# Patient Record
Sex: FEMALE | Race: WHITE | NOT HISPANIC OR LATINO | Employment: OTHER | URBAN - METROPOLITAN AREA
[De-identification: names, ages, dates, MRNs, and addresses within clinical notes are randomized per-mention and may not be internally consistent; named-entity substitution may affect disease eponyms.]

---

## 2018-11-13 ENCOUNTER — OFFICE VISIT (OUTPATIENT)
Dept: PODIATRY | Facility: CLINIC | Age: 83
End: 2018-11-13
Payer: MEDICARE

## 2018-11-13 VITALS
DIASTOLIC BLOOD PRESSURE: 79 MMHG | BODY MASS INDEX: 25.6 KG/M2 | HEIGHT: 59 IN | RESPIRATION RATE: 16 BRPM | WEIGHT: 127 LBS | HEART RATE: 69 BPM | SYSTOLIC BLOOD PRESSURE: 137 MMHG

## 2018-11-13 DIAGNOSIS — M20.11 VALGUS DEFORMITY OF BOTH GREAT TOES: ICD-10-CM

## 2018-11-13 DIAGNOSIS — M77.42 METATARSALGIA OF BOTH FEET: Primary | ICD-10-CM

## 2018-11-13 DIAGNOSIS — M77.41 METATARSALGIA OF BOTH FEET: Primary | ICD-10-CM

## 2018-11-13 DIAGNOSIS — L84 CORNS: ICD-10-CM

## 2018-11-13 DIAGNOSIS — M79.672 PAIN IN BOTH FEET: ICD-10-CM

## 2018-11-13 DIAGNOSIS — I70.209 PERIPHERAL ARTERIOSCLEROSIS (HCC): ICD-10-CM

## 2018-11-13 DIAGNOSIS — M21.969 ACQUIRED DEFORMITY OF FOOT, UNSPECIFIED LATERALITY: ICD-10-CM

## 2018-11-13 DIAGNOSIS — M79.671 PAIN IN BOTH FEET: ICD-10-CM

## 2018-11-13 DIAGNOSIS — M20.12 VALGUS DEFORMITY OF BOTH GREAT TOES: ICD-10-CM

## 2018-11-13 PROCEDURE — 99202 OFFICE O/P NEW SF 15 MIN: CPT | Performed by: PODIATRIST

## 2018-11-13 RX ORDER — METOPROLOL TARTRATE 50 MG/1
50 TABLET, FILM COATED ORAL 2 TIMES DAILY
COMMUNITY
Start: 2018-10-12 | End: 2019-06-06 | Stop reason: SDUPTHER

## 2018-11-13 RX ORDER — LOSARTAN POTASSIUM 50 MG/1
TABLET ORAL
COMMUNITY
Start: 2018-11-07 | End: 2019-06-06 | Stop reason: SDUPTHER

## 2018-11-13 RX ORDER — ASPIRIN 325 MG
325 TABLET ORAL DAILY
COMMUNITY
End: 2020-07-22

## 2018-11-13 RX ORDER — AMLODIPINE BESYLATE 5 MG/1
5 TABLET ORAL DAILY
COMMUNITY
End: 2019-06-06 | Stop reason: SDUPTHER

## 2018-11-13 RX ORDER — BIMATOPROST 0.01 %
1 DROPS OPHTHALMIC (EYE)
COMMUNITY
Start: 2018-08-20

## 2018-11-13 RX ORDER — ATORVASTATIN CALCIUM 10 MG/1
10 TABLET, FILM COATED ORAL DAILY
COMMUNITY
End: 2019-06-06 | Stop reason: SDUPTHER

## 2018-11-13 NOTE — PROGRESS NOTES
Assessment/Plan:  Pain  Metatarsalgia  Peripheral artery disease  Bunion formation  Callus formation  Mycotic toenail  Plan  Foot exam performed  Patient educated on conditions  All mycotic nails debrided  Plantar lesions debrided as well    No problem-specific Assessment & Plan notes found for this encounter  There are no diagnoses linked to this encounter  Subjective:  Patient complains of pain in her feet  Patient has pain with ambulation  No history of trauma  She has pain with shoe wear  No past medical history on file  No past surgical history on file  Allergies   Allergen Reactions    Cephalexin          Current Outpatient Prescriptions:     metoprolol tartrate (LOPRESSOR) 50 mg tablet, Take 50 mg by mouth 2 (two) times a day, Disp: , Rfl:     amLODIPine (NORVASC) 5 mg tablet, Take 5 mg by mouth daily, Disp: , Rfl:     aspirin 325 mg tablet, Take 325 mg by mouth daily, Disp: , Rfl:     atorvastatin (LIPITOR) 10 mg tablet, Take 10 mg by mouth daily, Disp: , Rfl:     losartan (COZAAR) 50 mg tablet, , Disp: , Rfl:     LUMIGAN 0 01 % ophthalmic drops, , Disp: , Rfl:     There is no problem list on file for this patient  Patient ID: Verona Goetz is a 80 y o  female  HPI    The following portions of the patient's history were reviewed and updated as appropriate: allergies, current medications, past family history, past medical history, past social history, past surgical history and problem list     Review of Systems      Objective:  Patient's shoes and socks removed     Foot Exam    General  General Appearance: appears stated age and healthy   Orientation: alert and oriented to person, place, and time   Affect: appropriate   Gait: antalgic       Right Foot/Ankle     Inspection and Palpation  Ecchymosis: none  Tenderness: metatarsals   Swelling: metatarsals   Arch: pes planus  Hammertoes: fifth toe  Hallux valgus: yes  Hallux limitus: yes  Skin Exam: callus; Neurovascular  Dorsalis pedis: 1+  Posterior tibial: 1+  Superficial peroneal nerve sensation: diminished  Deep peroneal nerve sensation: diminished  Sural nerve sensation: diminished  Achilles reflex: 2+    Muscle Strength  Ankle dorsiflexion: 4+      Left Foot/Ankle      Inspection and Palpation  Tenderness: metatarsals   Swelling: metatarsals   Arch: pes planus  Hammertoes: fifth toe  Hallux valgus: yes  Hallux limitus: yes    Neurovascular  Dorsalis pedis: 1+  Superficial peroneal nerve sensation: diminished  Deep peroneal nerve sensation: diminished  Sural nerve sensation: diminished  Achilles reflex: 2+    Muscle Strength  Ankle dorsiflexion: 4+        Physical Exam   Constitutional: She appears well-developed and well-nourished  Cardiovascular: Normal rate and regular rhythm  Pulses:       Dorsalis pedis pulses are 1+ on the right side, and 1+ on the left side  Posterior tibial pulses are 1+ on the right side  QA 9 findings noted bilateral   Negative digital hair noted  Positive abnormal cooling  Feet:   Right Foot:   Skin Integrity: Positive for callus  Neurological:   Reflex Scores:       Achilles reflexes are 2+ on the right side and 2+ on the left side  Skin:   Tylenol submetatarsal 2 noted  This is bilateral     Severe thickened mycotic toenail noted bilateral   Nails are yellow with debris  Positive malodor noted   Nursing note and vitals reviewed

## 2019-01-28 ENCOUNTER — OFFICE VISIT (OUTPATIENT)
Dept: PODIATRY | Facility: CLINIC | Age: 84
End: 2019-01-28
Payer: MEDICARE

## 2019-01-28 VITALS
SYSTOLIC BLOOD PRESSURE: 135 MMHG | HEART RATE: 65 BPM | HEIGHT: 59 IN | BODY MASS INDEX: 25.6 KG/M2 | RESPIRATION RATE: 16 BRPM | WEIGHT: 127 LBS | DIASTOLIC BLOOD PRESSURE: 86 MMHG

## 2019-01-28 DIAGNOSIS — I70.209 PERIPHERAL ARTERIOSCLEROSIS (HCC): ICD-10-CM

## 2019-01-28 DIAGNOSIS — L84 CORNS: ICD-10-CM

## 2019-01-28 DIAGNOSIS — M79.672 PAIN IN BOTH FEET: Primary | ICD-10-CM

## 2019-01-28 DIAGNOSIS — M79.671 PAIN IN BOTH FEET: Primary | ICD-10-CM

## 2019-01-28 PROCEDURE — 11056 PARNG/CUTG B9 HYPRKR LES 2-4: CPT | Performed by: PODIATRIST

## 2019-01-28 NOTE — PROGRESS NOTES
Procedures   Foot Exam     Assessment/Plan:  Pain  Metatarsalgia  Peripheral artery disease  Bunion formation  Callus formation  Mycotic toenail      Plan  Foot exam performed  Patient educated on conditions  All mycotic nails debrided  Plantar lesions debrided as well     No problem-specific Assessment & Plan notes found for this encounter          There are no diagnoses linked to this encounter        Subjective:  Patient complains of pain in her feet  Patient has pain with ambulation  No history of trauma  She has pain with shoe wear  No past medical history on file      No past surgical history on file           Allergies   Allergen Reactions    Cephalexin              Current Outpatient Prescriptions:     metoprolol tartrate (LOPRESSOR) 50 mg tablet, Take 50 mg by mouth 2 (two) times a day, Disp: , Rfl:     amLODIPine (NORVASC) 5 mg tablet, Take 5 mg by mouth daily, Disp: , Rfl:     aspirin 325 mg tablet, Take 325 mg by mouth daily, Disp: , Rfl:     atorvastatin (LIPITOR) 10 mg tablet, Take 10 mg by mouth daily, Disp: , Rfl:     losartan (COZAAR) 50 mg tablet, , Disp: , Rfl:     LUMIGAN 0 01 % ophthalmic drops, , Disp: , Rfl:      There is no problem list on file for this patient             Patient ID: Isidore Vic is a 80 y o  female      HPI     The following portions of the patient's history were reviewed and updated as appropriate: allergies, current medications, past family history, past medical history, past social history, past surgical history and problem list      Review of Systems       Objective:  Patient's shoes and socks removed     Foot Exam     General  General Appearance: appears stated age and healthy   Orientation: alert and oriented to person, place, and time   Affect: appropriate   Gait: antalgic         Right Foot/Ankle      Inspection and Palpation  Ecchymosis: none  Tenderness: metatarsals   Swelling: metatarsals   Arch: pes planus  Hammertoes: fifth toe  Hallux valgus: yes  Hallux limitus: yes  Skin Exam: callus;      Neurovascular  Dorsalis pedis: 1+  Posterior tibial: 1+  Superficial peroneal nerve sensation: diminished  Deep peroneal nerve sensation: diminished  Sural nerve sensation: diminished  Achilles reflex: 2+     Muscle Strength  Ankle dorsiflexion: 4+        Left Foot/Ankle       Inspection and Palpation  Tenderness: metatarsals   Swelling: metatarsals   Arch: pes planus  Hammertoes: fifth toe  Hallux valgus: yes  Hallux limitus: yes     Neurovascular  Dorsalis pedis: 1+  Superficial peroneal nerve sensation: diminished  Deep peroneal nerve sensation: diminished  Sural nerve sensation: diminished  Achilles reflex: 2+     Muscle Strength  Ankle dorsiflexion: 4+        Physical Exam   Constitutional: She appears well-developed and well-nourished  Cardiovascular: Normal rate and regular rhythm  Pulses:       Dorsalis pedis pulses are 1+ on the right side, and 1+ on the left side  Posterior tibial pulses are 1+ on the right side  Q 9 findings noted bilateral   Negative digital hair noted  Positive abnormal cooling  Feet:   Right Foot:   Skin Integrity: Positive for callus  Neurological:   Reflex Scores:       Achilles reflexes are 2+ on the right side and 2+ on the left side  Skin:   Tylenol submetatarsal 2 noted  This is bilateral     Severe thickened mycotic toenail noted bilateral   Nails are yellow with debris    Positive malodor noted   Nursing note and vitals reviewed

## 2019-04-01 ENCOUNTER — OFFICE VISIT (OUTPATIENT)
Dept: PODIATRY | Facility: CLINIC | Age: 84
End: 2019-04-01
Payer: MEDICARE

## 2019-04-01 VITALS
HEART RATE: 67 BPM | BODY MASS INDEX: 25.6 KG/M2 | WEIGHT: 127 LBS | DIASTOLIC BLOOD PRESSURE: 73 MMHG | RESPIRATION RATE: 16 BRPM | SYSTOLIC BLOOD PRESSURE: 143 MMHG | HEIGHT: 59 IN

## 2019-04-01 DIAGNOSIS — I70.209 PERIPHERAL ARTERIOSCLEROSIS (HCC): Primary | ICD-10-CM

## 2019-04-01 DIAGNOSIS — M79.672 PAIN IN BOTH FEET: ICD-10-CM

## 2019-04-01 DIAGNOSIS — M79.671 PAIN IN BOTH FEET: ICD-10-CM

## 2019-04-01 DIAGNOSIS — L84 CORNS: ICD-10-CM

## 2019-04-01 PROCEDURE — 11056 PARNG/CUTG B9 HYPRKR LES 2-4: CPT | Performed by: PODIATRIST

## 2019-06-03 ENCOUNTER — OFFICE VISIT (OUTPATIENT)
Dept: PODIATRY | Facility: CLINIC | Age: 84
End: 2019-06-03
Payer: MEDICARE

## 2019-06-03 VITALS
HEIGHT: 59 IN | SYSTOLIC BLOOD PRESSURE: 121 MMHG | WEIGHT: 127 LBS | BODY MASS INDEX: 25.6 KG/M2 | DIASTOLIC BLOOD PRESSURE: 71 MMHG | HEART RATE: 62 BPM

## 2019-06-03 DIAGNOSIS — M79.672 PAIN IN BOTH FEET: ICD-10-CM

## 2019-06-03 DIAGNOSIS — M79.671 PAIN IN BOTH FEET: ICD-10-CM

## 2019-06-03 DIAGNOSIS — I70.209 PERIPHERAL ARTERIOSCLEROSIS (HCC): Primary | ICD-10-CM

## 2019-06-03 DIAGNOSIS — L84 CORNS: ICD-10-CM

## 2019-06-03 PROCEDURE — 11056 PARNG/CUTG B9 HYPRKR LES 2-4: CPT | Performed by: PODIATRIST

## 2019-06-06 ENCOUNTER — OFFICE VISIT (OUTPATIENT)
Dept: FAMILY MEDICINE CLINIC | Facility: CLINIC | Age: 84
End: 2019-06-06
Payer: MEDICARE

## 2019-06-06 VITALS
BODY MASS INDEX: 25.8 KG/M2 | DIASTOLIC BLOOD PRESSURE: 60 MMHG | WEIGHT: 128 LBS | RESPIRATION RATE: 16 BRPM | TEMPERATURE: 97.9 F | HEIGHT: 59 IN | HEART RATE: 52 BPM | SYSTOLIC BLOOD PRESSURE: 128 MMHG

## 2019-06-06 DIAGNOSIS — Z95.2 S/P AORTIC VALVE REPLACEMENT: ICD-10-CM

## 2019-06-06 DIAGNOSIS — R60.0 BILATERAL LEG EDEMA: Primary | ICD-10-CM

## 2019-06-06 DIAGNOSIS — E66.3 OVERWEIGHT (BMI 25.0-29.9): ICD-10-CM

## 2019-06-06 DIAGNOSIS — H35.30 MACULAR DEGENERATION OF BOTH EYES, UNSPECIFIED TYPE: ICD-10-CM

## 2019-06-06 DIAGNOSIS — H40.89 OTHER GLAUCOMA OF BOTH EYES: ICD-10-CM

## 2019-06-06 DIAGNOSIS — I44.0 HEART BLOCK, FIRST DEGREE: ICD-10-CM

## 2019-06-06 DIAGNOSIS — E78.2 MIXED HYPERLIPIDEMIA: ICD-10-CM

## 2019-06-06 DIAGNOSIS — I10 ESSENTIAL HYPERTENSION: ICD-10-CM

## 2019-06-06 DIAGNOSIS — I49.5 SINUS BRADY-TACHY SYNDROME (HCC): ICD-10-CM

## 2019-06-06 PROBLEM — I35.9 AORTIC VALVE DISEASE: Status: ACTIVE | Noted: 2018-04-17

## 2019-06-06 PROBLEM — E78.5 HYPERLIPIDEMIA: Status: ACTIVE | Noted: 2019-06-06

## 2019-06-06 PROCEDURE — 99204 OFFICE O/P NEW MOD 45 MIN: CPT | Performed by: FAMILY MEDICINE

## 2019-06-06 RX ORDER — ATORVASTATIN CALCIUM 10 MG/1
10 TABLET, FILM COATED ORAL DAILY
Qty: 90 TABLET | Refills: 1 | Status: SHIPPED | OUTPATIENT
Start: 2019-06-06 | End: 2020-03-04

## 2019-06-06 RX ORDER — AMLODIPINE BESYLATE 5 MG/1
5 TABLET ORAL DAILY
Qty: 90 TABLET | Refills: 1 | Status: SHIPPED | OUTPATIENT
Start: 2019-06-06 | End: 2020-02-24

## 2019-06-06 RX ORDER — LOSARTAN POTASSIUM 100 MG/1
100 TABLET ORAL DAILY
Qty: 90 TABLET | Refills: 1 | Status: SHIPPED | OUTPATIENT
Start: 2019-06-06 | End: 2019-10-07 | Stop reason: SDUPTHER

## 2019-06-21 ENCOUNTER — HOSPITAL ENCOUNTER (OUTPATIENT)
Dept: RADIOLOGY | Facility: HOSPITAL | Age: 84
Discharge: HOME | End: 2019-06-21
Attending: FAMILY MEDICINE
Payer: MEDICARE

## 2019-06-21 DIAGNOSIS — R60.0 BILATERAL LEG EDEMA: ICD-10-CM

## 2019-06-21 PROCEDURE — 93970 EXTREMITY STUDY: CPT | Performed by: SURGERY

## 2019-06-21 PROCEDURE — 93970 EXTREMITY STUDY: CPT

## 2019-06-24 ENCOUNTER — TELEPHONE (OUTPATIENT)
Dept: FAMILY MEDICINE CLINIC | Facility: CLINIC | Age: 84
End: 2019-06-24

## 2019-08-05 ENCOUNTER — OFFICE VISIT (OUTPATIENT)
Dept: PODIATRY | Facility: CLINIC | Age: 84
End: 2019-08-05
Payer: MEDICARE

## 2019-08-05 VITALS
BODY MASS INDEX: 25.8 KG/M2 | SYSTOLIC BLOOD PRESSURE: 127 MMHG | HEIGHT: 59 IN | DIASTOLIC BLOOD PRESSURE: 78 MMHG | RESPIRATION RATE: 17 BRPM | WEIGHT: 128 LBS | HEART RATE: 68 BPM

## 2019-08-05 DIAGNOSIS — I70.209 PERIPHERAL ARTERIOSCLEROSIS (HCC): Primary | ICD-10-CM

## 2019-08-05 DIAGNOSIS — L84 CORNS: ICD-10-CM

## 2019-08-05 DIAGNOSIS — M79.672 PAIN IN BOTH FEET: ICD-10-CM

## 2019-08-05 DIAGNOSIS — M79.671 PAIN IN BOTH FEET: ICD-10-CM

## 2019-08-05 PROCEDURE — 11056 PARNG/CUTG B9 HYPRKR LES 2-4: CPT | Performed by: PODIATRIST

## 2019-08-05 NOTE — PROGRESS NOTES
Assessment/Plan:  Pain   Metatarsalgia   Peripheral artery disease   Bunion formation   Callus formation   Mycotic toenail      Plan   Foot exam performed   Patient educated on conditions   All mycotic nails debrided   Plantar lesions debrided as well     No problem-specific Assessment & Plan notes found for this encounter          There are no diagnoses linked to this encounter        Subjective:  Patient complains of pain in her feet   Patient has pain with ambulation   No history of trauma   She has pain with shoe wear  No past medical history on file      No past surgical history on file              Allergies   Allergen Reactions    Cephalexin              Current Outpatient Prescriptions:     metoprolol tartrate (LOPRESSOR) 50 mg tablet, Take 50 mg by mouth 2 (two) times a day, Disp: , Rfl:     amLODIPine (NORVASC) 5 mg tablet, Take 5 mg by mouth daily, Disp: , Rfl:     aspirin 325 mg tablet, Take 325 mg by mouth daily, Disp: , Rfl:     atorvastatin (LIPITOR) 10 mg tablet, Take 10 mg by mouth daily, Disp: , Rfl:     losartan (COZAAR) 50 mg tablet, , Disp: , Rfl:     LUMIGAN 0 01 % ophthalmic drops, , Disp: , Rfl:      There is no problem list on file for this patient             Patient ID: Rea Huizar is a 80 y  o  female      HPI     The following portions of the patient's history were reviewed and updated as appropriate: allergies, current medications, past family history, past medical history, past social history, past surgical history and problem list      Review of Systems       Objective:  Patient's shoes and socks removed    Foot Exam     General  General Appearance: appears stated age and healthy   Orientation: alert and oriented to person, place, and time   Affect: appropriate   Gait: antalgic         Right Foot/Ankle      Inspection and Palpation  Ecchymosis: none  Tenderness: metatarsals   Swelling: metatarsals   Arch: pes planus  Hammertoes: fifth toe  Hallux valgus: yes  Hallux limitus: yes  Skin Exam: callus;      Neurovascular  Dorsalis pedis: 1+  Posterior tibial: 1+  Superficial peroneal nerve sensation: diminished  Deep peroneal nerve sensation: diminished  Sural nerve sensation: diminished  Achilles reflex: 2+     Muscle Strength  Ankle dorsiflexion: 4+        Left Foot/Ankle       Inspection and Palpation  Tenderness: metatarsals   Swelling: metatarsals   Arch: pes planus  Hammertoes: fifth toe  Hallux valgus: yes  Hallux limitus: yes     Neurovascular  Dorsalis pedis: 1+  Superficial peroneal nerve sensation: diminished  Deep peroneal nerve sensation: diminished  Sural nerve sensation: diminished  Achilles reflex: 2+     Muscle Strength  Ankle dorsiflexion: 4+        Physical Exam   Constitutional: She appears well-developed and well-nourished  Cardiovascular: Normal rate and regular rhythm     Pulses:       Dorsalis pedis pulses are 1+ on the right side, and 1+ on the left side         Posterior tibial pulses are 1+ on the right side  Q 9 findings noted bilateral   Negative digital hair noted   Positive abnormal cooling    Feet:   Right Foot:   Skin Integrity: Positive for callus  Neurological:   Reflex Scores:       Achilles reflexes are 2+ on the right side and 2+ on the left side    Skin:   Tylenol submetatarsal 2 noted   This is bilateral     Severe thickened mycotic toenail noted bilateral   Nails are yellow with debris   Positive malodor noted   Nursing note and vitals reviewed

## 2019-10-07 ENCOUNTER — OFFICE VISIT (OUTPATIENT)
Dept: FAMILY MEDICINE CLINIC | Facility: CLINIC | Age: 84
End: 2019-10-07
Payer: MEDICARE

## 2019-10-07 VITALS
SYSTOLIC BLOOD PRESSURE: 130 MMHG | HEART RATE: 52 BPM | HEIGHT: 59 IN | RESPIRATION RATE: 16 BRPM | DIASTOLIC BLOOD PRESSURE: 58 MMHG | BODY MASS INDEX: 25.2 KG/M2 | TEMPERATURE: 97.3 F | WEIGHT: 125 LBS

## 2019-10-07 DIAGNOSIS — I49.5 SINUS BRADY-TACHY SYNDROME (HCC): ICD-10-CM

## 2019-10-07 DIAGNOSIS — Z95.2 S/P AORTIC VALVE REPLACEMENT: ICD-10-CM

## 2019-10-07 DIAGNOSIS — I10 ESSENTIAL HYPERTENSION: ICD-10-CM

## 2019-10-07 DIAGNOSIS — R60.0 BILATERAL LEG EDEMA: ICD-10-CM

## 2019-10-07 DIAGNOSIS — H40.9 GLAUCOMA OF BOTH EYES, UNSPECIFIED GLAUCOMA TYPE: ICD-10-CM

## 2019-10-07 DIAGNOSIS — E78.2 MIXED HYPERLIPIDEMIA: ICD-10-CM

## 2019-10-07 DIAGNOSIS — Z00.00 MEDICARE ANNUAL WELLNESS VISIT, INITIAL: Primary | ICD-10-CM

## 2019-10-07 DIAGNOSIS — Z23 NEED FOR VACCINATION: ICD-10-CM

## 2019-10-07 DIAGNOSIS — H91.93 DECREASED HEARING OF BOTH EARS: ICD-10-CM

## 2019-10-07 PROBLEM — H91.90 HEARING DECREASED: Status: ACTIVE | Noted: 2019-10-07

## 2019-10-07 PROCEDURE — G0008 ADMIN INFLUENZA VIRUS VAC: HCPCS

## 2019-10-07 PROCEDURE — G0438 PPPS, INITIAL VISIT: HCPCS | Performed by: FAMILY MEDICINE

## 2019-10-07 PROCEDURE — 90662 IIV NO PRSV INCREASED AG IM: CPT

## 2019-10-07 PROCEDURE — G0009 ADMIN PNEUMOCOCCAL VACCINE: HCPCS

## 2019-10-07 PROCEDURE — 90670 PCV13 VACCINE IM: CPT

## 2019-10-07 RX ORDER — TIMOLOL MALEATE 5 MG/ML
SOLUTION OPHTHALMIC EVERY MORNING
COMMUNITY
Start: 2019-09-05

## 2019-10-07 RX ORDER — LOSARTAN POTASSIUM 25 MG/1
25 TABLET ORAL DAILY
Qty: 90 TABLET | Refills: 1 | Status: SHIPPED | OUTPATIENT
Start: 2019-10-07 | End: 2020-03-27

## 2019-10-07 RX ORDER — FAMOTIDINE 20 MG/1
20 TABLET, FILM COATED ORAL DAILY
COMMUNITY
End: 2022-04-04

## 2019-10-07 NOTE — PATIENT INSTRUCTIONS
Medicare Preventive Visit Patient Instructions  Thank you for completing your Welcome to Medicare Visit or Medicare Annual Wellness Visit today  Your next wellness visit will be due in one year (10/7/2020)  The screening/preventive services that you may require over the next 5-10 years are detailed below  Some tests may not apply to you based off risk factors and/or age  Screening tests ordered at today's visit but not completed yet may show as past due  Also, please note that scanned in results may not display below  Preventive Screenings:  Service Recommendations Previous Testing/Comments   Colorectal Cancer Screening  * Colonoscopy    * Fecal Occult Blood Test (FOBT)/Fecal Immunochemical Test (FIT)  * Fecal DNA/Cologuard Test  * Flexible Sigmoidoscopy Age: 54-65 years old   Colonoscopy: every 10 years (may be performed more frequently if at higher risk)  OR  FOBT/FIT: every 1 year  OR  Cologuard: every 3 years  OR  Sigmoidoscopy: every 5 years  Screening may be recommended earlier than age 48 if at higher risk for colorectal cancer  Also, an individualized decision between you and your healthcare provider will decide whether screening between the ages of 74-80 would be appropriate  Colonoscopy: Not on file  FOBT/FIT: Not on file  Cologuard: Not on file  Sigmoidoscopy: Not on file         Breast Cancer Screening Age: 36 years old  Frequency: every 1-2 years  Not required if history of left and right mastectomy Mammogram: Not on file       Cervical Cancer Screening Between the ages of 21-29, pap smear recommended once every 3 years  Between the ages of 33-67, can perform pap smear with HPV co-testing every 5 years     Recommendations may differ for women with a history of total hysterectomy, cervical cancer, or abnormal pap smears in past  Pap Smear: Not on file    Screening Not Indicated   Hepatitis C Screening Once for adults born between [facility]  More frequently in patients at high risk for Hepatitis C Hep C Antibody: Not on file       Diabetes Screening 1-2 times per year if you're at risk for diabetes or have pre-diabetes Fasting glucose: No results in last 5 years   A1C: No results in last 5 years       Cholesterol Screening Once every 5 years if you don't have a lipid disorder  May order more often based on risk factors  Lipid panel: Not on file    Screening Not Indicated  History Lipid Disorder     Other Preventive Screenings Covered by Medicare:  1  Abdominal Aortic Aneurysm (AAA) Screening: covered once if your at risk  You're considered to be at risk if you have a family history of AAA  2  Lung Cancer Screening: covers low dose CT scan once per year if you meet all of the following conditions: (1) Age 50-69; (2) No signs or symptoms of lung cancer; (3) Current smoker or have quit smoking within the last 15 years; (4) You have a tobacco smoking history of at least 30 pack years (packs per day multiplied by number of years you smoked); (5) You get a written order from a healthcare provider  3  Glaucoma Screening: covered annually if you're considered high risk: (1) You have diabetes OR (2) Family history of glaucoma OR (3)  aged 48 and older OR (3)  American aged 72 and older  3  Osteoporosis Screening: covered every 2 years if you meet one of the following conditions: (1) You're estrogen deficient and at risk for osteoporosis based off medical history and other findings; (2) Have a vertebral abnormality; (3) On glucocorticoid therapy for more than 3 months; (4) Have primary hyperparathyroidism; (5) On osteoporosis medications and need to assess response to drug therapy  · Last bone density test (DXA Scan): Not on file  5  HIV Screening: covered annually if you're between the age of 12-76  Also covered annually if you are younger than 13 and older than 72 with risk factors for HIV infection   For pregnant patients, it is covered up to 3 times per pregnancy  Immunizations:  Immunization Recommendations   Influenza Vaccine Annual influenza vaccination during flu season is recommended for all persons aged >= 6 months who do not have contraindications   Pneumococcal Vaccine (Prevnar and Pneumovax)  * Prevnar = PCV13  * Pneumovax = PPSV23   Adults 25-60 years old: 1-3 doses may be recommended based on certain risk factors  Adults 72 years old: Prevnar (PCV13) vaccine recommended followed by Pneumovax (PPSV23) vaccine  If already received PPSV23 since turning 65, then PCV13 recommended at least one year after PPSV23 dose  Hepatitis B Vaccine 3 dose series if at intermediate or high risk (ex: diabetes, end stage renal disease, liver disease)   Tetanus (Td) Vaccine - COST NOT COVERED BY MEDICARE PART B Following completion of primary series, a booster dose should be given every 10 years to maintain immunity against tetanus  Td may also be given as tetanus wound prophylaxis  Tdap Vaccine - COST NOT COVERED BY MEDICARE PART B Recommended at least once for all adults  For pregnant patients, recommended with each pregnancy  Shingles Vaccine (Shingrix) - COST NOT COVERED BY MEDICARE PART B  2 shot series recommended in those aged 48 and above     Health Maintenance Due:  There are no preventive care reminders to display for this patient  Immunizations Due:      Topic Date Due    DTaP,Tdap,and Td Vaccines (1 - Tdap) 05/14/1956    Pneumococcal Vaccine: 65+ Years (2 of 2 - PCV13) 11/02/2014    INFLUENZA VACCINE  07/01/2019     Advance Directives   What are advance directives? Advance directives are legal documents that state your wishes and plans for medical care  These plans are made ahead of time in case you lose your ability to make decisions for yourself  Advance directives can apply to any medical decision, such as the treatments you want, and if you want to donate organs  What are the types of advance directives?   There are many types of advance directives, and each state has rules about how to use them  You may choose a combination of any of the following:  · Living will: This is a written record of the treatment you want  You can also choose which treatments you do not want, which to limit, and which to stop at a certain time  This includes surgery, medicine, IV fluid, and tube feedings  · Durable power of  for healthcare [facility]): This is a written record that states who you want to make healthcare choices for you when you are unable to make them for yourself  This person, called a proxy, is usually a family member or a friend  You may choose more than 1 proxy  · Do not resuscitate (DNR) order:  A DNR order is used in case your heart stops beating or you stop breathing  It is a request not to have certain forms of treatment, such as CPR  A DNR order may be included in other types of advance directives  · Medical directive: This covers the care that you want if you are in a coma, near death, or unable to make decisions for yourself  You can list the treatments you want for each condition  Treatment may include pain medicine, surgery, blood transfusions, dialysis, IV or tube feedings, and a ventilator (breathing machine)  · Values history: This document has questions about your views, beliefs, and how you feel and think about life  This information can help others choose the care that you would choose  Why are advance directives important? An advance directive helps you control your care  Although spoken wishes may be used, it is better to have your wishes written down  Spoken wishes can be misunderstood, or not followed  Treatments may be given even if you do not want them  An advance directive may make it easier for your family to make difficult choices about your care  Urinary Incontinence   Urinary incontinence (UI)  is when you lose control of your bladder  UI develops because your bladder cannot store or empty urine properly   The 3 most common types of UI are stress incontinence, urge incontinence, or both  Medicines:   · May be given to help strengthen your bladder control  Report any side effects of medication to your healthcare provider  Do pelvic muscle exercises often:  Your pelvic muscles help you stop urinating  Squeeze these muscles tight for 5 seconds, then relax for 5 seconds  Gradually work up to squeezing for 10 seconds  Do 3 sets of 15 repetitions a day, or as directed  This will help strengthen your pelvic muscles and improve bladder control  Train your bladder:  Go to the bathroom at set times, such as every 2 hours, even if you do not feel the urge to go  You can also try to hold your urine when you feel the urge to go  For example, hold your urine for 5 minutes when you feel the urge to go  As that becomes easier, hold your urine for 10 minutes  Self-care:   · Keep a UI record  Write down how often you leak urine and how much you leak  Make a note of what you were doing when you leaked urine  · Drink liquids as directed  You may need to limit the amount of liquid you drink to help control your urine leakage  Do not drink any liquid right before you go to bed  Limit or do not have drinks that contain caffeine or alcohol  · Prevent constipation  Eat a variety of high-fiber foods  Good examples are high-fiber cereals, beans, vegetables, and whole-grain breads  Walking is the best way to trigger your intestines to have a bowel movement  · Exercise regularly and maintain a healthy weight  Weight loss and exercise will decrease pressure on your bladder and help you control your leakage  · Use a catheter as directed  to help empty your bladder  A catheter is a tiny, plastic tube that is put into your bladder to drain your urine  · Go to behavior therapy as directed  Behavior therapy may be used to help you learn to control your urge to urinate      Weight Management   Why it is important to manage your weight: Being overweight increases your risk of health conditions such as heart disease, high blood pressure, type 2 diabetes, and certain types of cancer  It can also increase your risk for osteoarthritis, sleep apnea, and other respiratory problems  Aim for a slow, steady weight loss  Even a small amount of weight loss can lower your risk of health problems  How to lose weight safely:  A safe and healthy way to lose weight is to eat fewer calories and get regular exercise  You can lose up about 1 pound a week by decreasing the number of calories you eat by 500 calories each day  Healthy meal plan for weight management:  A healthy meal plan includes a variety of foods, contains fewer calories, and helps you stay healthy  A healthy meal plan includes the following:  · Eat whole-grain foods more often  A healthy meal plan should contain fiber  Fiber is the part of grains, fruits, and vegetables that is not broken down by your body  Whole-grain foods are healthy and provide extra fiber in your diet  Some examples of whole-grain foods are whole-wheat breads and pastas, oatmeal, brown rice, and bulgur  · Eat a variety of vegetables every day  Include dark, leafy greens such as spinach, kale, teo greens, and mustard greens  Eat yellow and orange vegetables such as carrots, sweet potatoes, and winter squash  · Eat a variety of fruits every day  Choose fresh or canned fruit (canned in its own juice or light syrup) instead of juice  Fruit juice has very little or no fiber  · Eat low-fat dairy foods  Drink fat-free (skim) milk or 1% milk  Eat fat-free yogurt and low-fat cottage cheese  Try low-fat cheeses such as mozzarella and other reduced-fat cheeses  · Choose meat and other protein foods that are low in fat  Choose beans or other legumes such as split peas or lentils  Choose fish, skinless poultry (chicken or turkey), or lean cuts of red meat (beef or pork)   Before you cook meat or poultry, cut off any visible fat    · Use less fat and oil  Try baking foods instead of frying them  Add less fat, such as margarine, sour cream, regular salad dressing and mayonnaise to foods  Eat fewer high-fat foods  Some examples of high-fat foods include french fries, doughnuts, ice cream, and cakes  · Eat fewer sweets  Limit foods and drinks that are high in sugar  This includes candy, cookies, regular soda, and sweetened drinks  Exercise:  Exercise at least 30 minutes per day on most days of the week  Some examples of exercise include walking, biking, dancing, and swimming  You can also fit in more physical activity by taking the stairs instead of the elevator or parking farther away from stores  Ask your healthcare provider about the best exercise plan for you  © Copyright [employer] 2018 Information is for End User's use only and may not be sold, redistributed or otherwise used for commercial purposes   All illustrations and images included in CareNotes® are the copyrighted property of [employer]  or [address] [place] [address]

## 2019-10-07 NOTE — PROGRESS NOTES
Assessment and Plan:     Problem List Items Addressed This Visit        Cardiovascular and Mediastinum    Sinus luis angel-tachy syndrome ([address] )    Essential hypertension    Relevant Medications    losartan (COZAAR) 25 mg tablet       Other    Mixed hyperlipidemia    Bilateral leg edema     Pt purchased stockings at [facility] and notices improvement in the legs         S/P aortic valve replacement    Glaucoma of both eyes    Relevant Medications    timolol (TIMOPTIC-XE) 0 5 % ophthalmic gel-forming    Hearing decreased    Relevant Orders    Ambulatory referral to Audiology      Other Visit Diagnoses     Medicare annual wellness visit, initial    -  Primary    Need for vaccination        Relevant Orders    PNEUMOCOCCAL CONJUGATE VACCINE 13-VALENT GREATER THAN 6 MONTHS    influenza vaccine, high-dose, PF 0 5 mL           Preventive health issues were discussed with patient, and age appropriate screening tests were ordered as noted in patient's After Visit Summary  Personalized health advice and appropriate referrals for health education or preventive services given if needed, as noted in patient's After Visit Summary       History of Present Illness:     Patient presents for Medicare Annual Wellness visit  Pt states this is her first  Last colon was 2 years ago - pt will get me results of colonoscopy    Daughter is asking about getting the pt's hearing tested    Patient Care Team:  Little Ramirez DO as PCP - General (Family Medicine)  Porter Sequeira MD as Consulting Physician (Ophthalmology)  Dutch Bello DPM as Consulting Physician (Podiatry)     Problem List:     Patient Active Problem List   Diagnosis    Metatarsalgia of both feet    Acquired deformity of foot    Valgus deformity of both great toes    Pain in both feet    Peripheral arteriosclerosis ([address] )    Corns    Sinus luis angel-tachy syndrome ([address] )    Essential hypertension    Mixed hyperlipidemia    Heart block, first degree    Aortic valve disease  Bilateral leg edema    S/P aortic valve replacement    Glaucoma of both eyes    Macular degeneration of both eyes    Hearing decreased      Past Medical and Surgical History:     Past Medical History:   Diagnosis Date    Aortic valve disease     Heart block, first degree     Hyperlipidemia     Hypertension     Sinus luis angel-tachy syndrome (HCC)     SSS (sick sinus syndrome) (HCC)      Past Surgical History:   Procedure Laterality Date    AORTIC VALVE REPLACEMENT  2015    Porcine valve      Family History:     Family History   Problem Relation Age of Onset    Heart disease Mother     Hypertension Mother     Diabetes Mother     Heart disease Father     Heart attack Father          at 54    Mental illness Neg Hx       Social History:     Social History     Socioeconomic History    Marital status: /Civil Union     Spouse name: None    Number of children: None    Years of education: None    Highest education level: None   Occupational History    None   Social Needs    Financial resource strain: None    Food insecurity:     Worry: None     Inability: None    Transportation needs:     Medical: None     Non-medical: None   Tobacco Use    Smoking status: Never Smoker    Smokeless tobacco: Never Used   Substance and Sexual Activity    Alcohol use: Not Currently    Drug use: None    Sexual activity: None   Lifestyle    Physical activity:     Days per week: None     Minutes per session: None    Stress: None   Relationships    Social connections:     Talks on phone: None     Gets together: None     Attends [employer] service: None     Active member of club or organization: None     Attends meetings of clubs or organizations: None     Relationship status: None    Intimate partner violence:     Fear of current or ex partner: None     Emotionally abused: None     Physically abused: None     Forced sexual activity: None   Other Topics Concern    None   Social History Narrative    None       Medications and Allergies:     Current Outpatient Medications   Medication Sig Dispense Refill    amLODIPine (NORVASC) 5 mg tablet Take 1 tablet (5 mg total) by mouth daily 90 tablet 1    aspirin 325 mg tablet Take 325 mg by mouth daily      atorvastatin (LIPITOR) 10 mg tablet Take 1 tablet (10 mg total) by mouth daily 90 tablet 1    famotidine (PEPCID) 20 mg tablet Take 20 mg by mouth 2 (two) times a day      losartan (COZAAR) 25 mg tablet Take 1 tablet (25 mg total) by mouth daily 90 tablet 1    LUMIGAN 0 01 % ophthalmic drops       metoprolol tartrate (LOPRESSOR) 25 mg tablet Take 0 5 tablets (12 5 mg total) by mouth 2 (two) times a day 90 tablet 1    timolol (TIMOPTIC-XE) 0 5 % ophthalmic gel-forming        No current facility-administered medications for this visit  Allergies   Allergen Reactions    Cephalexin       Immunizations:     Immunization History   Administered Date(s) Administered    INFLUENZA 09/27/2016, 09/13/2017, 09/17/2018    Pneumococcal Polysaccharide PPV23 11/02/2013      Health Maintenance: There are no preventive care reminders to display for this patient  Topic Date Due    DTaP,Tdap,and Td Vaccines (1 - Tdap) 05/14/1956    Pneumococcal Vaccine: 65+ Years (2 of 2 - PCV13) 11/02/2014    INFLUENZA VACCINE  07/01/2019      Medicare Health Risk Assessment:     /58   Pulse (!) 52   Temp (!) 97 3 °F (36 3 °C)   Resp 16   Ht 4' 11" (1 499 m)   Wt 56 7 kg (125 lb)   BMI 25 25 kg/m²      Lilia De Luna is here for her Initial Wellness visit  Health Risk Assessment:   Patient rates overall health as good  Patient feels that their physical health rating is same  Eyesight was rated as slightly worse  Hearing was rated as slightly worse  Patient feels that their emotional and mental health rating is same  Pain experienced in the last 7 days has been none  Patient states that she has experienced no weight loss or gain in last 6 months       Depression Screening:   PHQ-2 Score: 0      Fall Risk Screening: In the past year, patient has experienced: no history of falling in past year      Urinary Incontinence Screening:   Patient has leaked urine accidently in the last six months  Home Safety:  Patient has trouble with stairs inside or outside of their home  Patient has working smoke alarms and has no working carbon monoxide detector  Home safety hazards include: none  Nutrition:   Current diet is Regular and Limited junk food  Medications:   Patient is currently taking over-the-counter supplements  OTC medications include: see medication list  Patient is able to manage medications  Activities of Daily Living (ADLs)/Instrumental Activities of Daily Living (IADLs):   Walk and transfer into and out of bed and chair?: Yes  Dress and groom yourself?: Yes    Bathe or shower yourself?: Yes    Feed yourself?  Yes  Do your laundry/housekeeping?: Yes  Manage your money, pay your bills and track your expenses?: Yes  Make your own meals?: Yes    Do your own shopping?: No    ADL comments: Daughter helps with house keeping and shopping     Previous Hospitalizations:   Any hospitalizations or ED visits within the last 12 months?: No      Advance Care Planning:   Living will: Yes    Advanced directive: Yes      Cognitive Screening:   Provider or family/friend/caregiver concerned regarding cognition?: No    PREVENTIVE SCREENINGS      Cardiovascular Screening:    General: History Lipid Disorder and Risks and Benefits Discussed    Due for: Lipid Panel      Diabetes Screening:     General: Risks and Benefits Discussed    Due for: Blood Glucose      Colorectal Cancer Screening:     General: Screening Not Indicated      Breast Cancer Screening:     General: Patient Declines      Cervical Cancer Screening:    General: Screening Not Indicated      Osteoporosis Screening:    General: Screening Not Indicated      Abdominal Aortic Aneurysm (AAA) Screening:        General: Screening Not Indicated      Lung Cancer Screening:     General: Screening Not Indicated      Hepatitis C Screening:    General: Screening Not Indicated      Juan M Carlos DO

## 2019-10-14 ENCOUNTER — OFFICE VISIT (OUTPATIENT)
Dept: PODIATRY | Facility: CLINIC | Age: 84
End: 2019-10-14
Payer: MEDICARE

## 2019-10-14 VITALS
RESPIRATION RATE: 16 BRPM | HEIGHT: 59 IN | BODY MASS INDEX: 25.2 KG/M2 | SYSTOLIC BLOOD PRESSURE: 142 MMHG | DIASTOLIC BLOOD PRESSURE: 87 MMHG | WEIGHT: 125 LBS

## 2019-10-14 DIAGNOSIS — M79.672 PAIN IN BOTH FEET: ICD-10-CM

## 2019-10-14 DIAGNOSIS — L84 CORNS: ICD-10-CM

## 2019-10-14 DIAGNOSIS — I70.209 PERIPHERAL ARTERIOSCLEROSIS (HCC): Primary | ICD-10-CM

## 2019-10-14 DIAGNOSIS — M79.671 PAIN IN BOTH FEET: ICD-10-CM

## 2019-10-14 PROCEDURE — 99213 OFFICE O/P EST LOW 20 MIN: CPT | Performed by: PODIATRIST

## 2019-10-14 PROCEDURE — 11056 PARNG/CUTG B9 HYPRKR LES 2-4: CPT | Performed by: PODIATRIST

## 2019-10-14 NOTE — PROGRESS NOTES
Signed  Encounter Date: 8/5/2019   Assessment/Plan:  Pain   Metatarsalgia   Peripheral artery disease   Bunion formation   Callus formation   Mycotic toenail      Plan   Foot exam performed   Patient educated on conditions   All mycotic nails debrided   Plantar lesions debrided as well     No problem-specific Assessment & Plan notes found for this encounter          There are no diagnoses linked to this encounter        Subjective:  Patient complains of pain in her feet   Patient has pain with ambulation   No history of trauma   She has pain with shoe wear  No past medical history on file      No past surgical history on file              Allergies   Allergen Reactions    Cephalexin              Current Outpatient Prescriptions:     metoprolol tartrate (LOPRESSOR) 50 mg tablet, Take 50 mg by mouth 2 (two) times a day, Disp: , Rfl:     amLODIPine (NORVASC) 5 mg tablet, Take 5 mg by mouth daily, Disp: , Rfl:     aspirin 325 mg tablet, Take 325 mg by mouth daily, Disp: , Rfl:     atorvastatin (LIPITOR) 10 mg tablet, Take 10 mg by mouth daily, Disp: , Rfl:     losartan (COZAAR) 50 mg tablet, , Disp: , Rfl:     LUMIGAN 0 01 % ophthalmic drops, , Disp: , Rfl:      There is no problem list on file for this patient             Patient ID: Rea Huizar is a 80 y  o  female      HPI     The following portions of the patient's history were reviewed and updated as appropriate: allergies, current medications, past family history, past medical history, past social history, past surgical history and problem list      Review of Systems       Objective:  Patient's shoes and socks removed    Foot Exam     General  General Appearance: appears stated age and healthy   Orientation: alert and oriented to person, place, and time   Affect: appropriate   Gait: antalgic         Right Foot/Ankle      Inspection and Palpation  Ecchymosis: none  Tenderness: metatarsals   Swelling: metatarsals   Arch: pes planus  Hammertoes: fifth toe  Hallux valgus: yes  Hallux limitus: yes  Skin Exam: callus;      Neurovascular  Dorsalis pedis: 1+  Posterior tibial: 1+  Superficial peroneal nerve sensation: diminished  Deep peroneal nerve sensation: diminished  Sural nerve sensation: diminished  Achilles reflex: 2+     Muscle Strength  Ankle dorsiflexion: 4+        Left Foot/Ankle       Inspection and Palpation  Tenderness: metatarsals   Swelling: metatarsals   Arch: pes planus  Hammertoes: fifth toe  Hallux valgus: yes  Hallux limitus: yes     Neurovascular  Dorsalis pedis: 1+  Superficial peroneal nerve sensation: diminished  Deep peroneal nerve sensation: diminished  Sural nerve sensation: diminished  Achilles reflex: 2+     Muscle Strength  Ankle dorsiflexion: 4+        Physical Exam   Constitutional: She appears well-developed and well-nourished  Cardiovascular: Normal rate and regular rhythm     Pulses:       Dorsalis pedis pulses are 1+ on the right side, and 1+ on the left side         Posterior tibial pulses are 1+ on the right side  Q 9 findings noted bilateral   Negative digital hair noted   Positive abnormal cooling    Feet:   Right Foot:   Skin Integrity: Positive for callus  Neurological:   Reflex Scores:       Achilles reflexes are 2+ on the right side and 2+ on the left side    Skin:   Tylenol submetatarsal 2 noted   This is bilateral     Severe thickened mycotic toenail noted bilateral   Nails are yellow with debris   Positive malodor noted   Nursing note and vitals reviewed

## 2019-10-16 ENCOUNTER — OFFICE VISIT (OUTPATIENT)
Dept: AUDIOLOGY | Facility: CLINIC | Age: 84
End: 2019-10-16
Payer: MEDICARE

## 2019-10-16 DIAGNOSIS — H90.3 SENSORY HEARING LOSS, BILATERAL: Primary | ICD-10-CM

## 2019-10-16 PROCEDURE — 92557 COMPREHENSIVE HEARING TEST: CPT | Performed by: AUDIOLOGIST

## 2019-10-16 PROCEDURE — 92567 TYMPANOMETRY: CPT | Performed by: AUDIOLOGIST

## 2019-10-16 NOTE — PROGRESS NOTES
HEARING EVALUATION    Name:  Molly Gavin  :  1935  Age:  80 y o  Date of Evaluation: 10/16/19     History: Difficulty Understanding  Reason for visit: Molly Gavin is being seen today at the request of Dr Elysia Pettit for an evaluation of hearing and was in the accompaniment of her daughter  Parent reports subjective changes to her hearing sensitivities that have gradually declined over the past few years  Daughter voiced she will often find her mother will ask for things to be repeated or when in background noise/noisy environments she will appear lost or confused  Patient does report her brother also has hearing loss; unsure of degree and type  Today patient denies any otalgia, tinnitus, or dizziness  She denies any recent ear infections, ear surgeries, head injuries, or falls  EVALUATION:    Otoscopic Evaluation:   Right Ear: Clear and healthy ear canal and tympanic membrane   Left Ear: Clear and healthy ear canal and tympanic membrane    Tympanometry:   Right: Type A - normal middle ear pressure and compliance   Left: Type A - normal middle ear pressure and compliance    Audiogram Results:  TEST METHOD: Conventional Audiometry utilizing inserts in the sound pizarro setting  RELIABILITY: Good  SPEECH RESULTS: Speech Recognition Thresholds (SRT) were obtained at 30 dB HL in the right ear and at 20 dB HL in the left ear  Live Voice word recognition utilizing the W-22 word list revealed 76% when presented at 80 dB HL (MCL) in the right ear and 88% when presented at 75 dB HL (MCL) in the left ear  TONAL RESULTS: Air and bone conduction testing revealed normal sloping to severe sensorineural hearing loss  Asymmetry noted at 1500 and 2k Hz; worse in right       *see attached audiogram      RECOMMENDATIONS:  Annual hearing eval, Return to [facility]  for F/U, Hearing Aid Evaluation and Copy to Patient/Caregiver    PATIENT EDUCATION:   Discussed results and recommendations with the patient and her daughter at [facility]  Patient was made aware she is a hearing aid candidate for both ears  A brief introduction was completed today on the different types and styles of hearing aids  Patient was demonstrated a demo -in-the-ear (RITE) style hearing aid  At this time patient voiced interest in returning to further discuss purchasing hearing aids through our facility  Patient and her daugther were made aware they would be following up with my colleague, Dr Amanda Ronquillo for this; they voiced understanding and were provided with her card  Patient is scheduled to return in November  I did encourage she bring a family member along with her; she voiced understanding       Libra Duran , CCC-A  Clinical Audiologist

## 2019-11-01 ENCOUNTER — OFFICE VISIT (OUTPATIENT)
Dept: AUDIOLOGY | Facility: CLINIC | Age: 84
End: 2019-11-01

## 2019-11-01 DIAGNOSIS — H90.3 SENSORY HEARING LOSS, BILATERAL: Primary | ICD-10-CM

## 2019-11-15 ENCOUNTER — OFFICE VISIT (OUTPATIENT)
Dept: AUDIOLOGY | Facility: CLINIC | Age: 84
End: 2019-11-15

## 2019-11-15 ENCOUNTER — APPOINTMENT (OUTPATIENT)
Dept: LAB | Facility: CLINIC | Age: 84
End: 2019-11-15
Payer: MEDICARE

## 2019-11-15 DIAGNOSIS — I49.5 SINUS BRADY-TACHY SYNDROME (HCC): ICD-10-CM

## 2019-11-15 DIAGNOSIS — E78.2 MIXED HYPERLIPIDEMIA: ICD-10-CM

## 2019-11-15 DIAGNOSIS — H90.3 SENSORY HEARING LOSS, BILATERAL: Primary | ICD-10-CM

## 2019-11-15 LAB
ALBUMIN SERPL BCP-MCNC: 4 G/DL (ref 3.5–5)
ALP SERPL-CCNC: 104 U/L (ref 46–116)
ALT SERPL W P-5'-P-CCNC: 19 U/L (ref 12–78)
ANION GAP SERPL CALCULATED.3IONS-SCNC: 6 MMOL/L (ref 4–13)
AST SERPL W P-5'-P-CCNC: 22 U/L (ref 5–45)
BILIRUB SERPL-MCNC: 0.55 MG/DL (ref 0.2–1)
BUN SERPL-MCNC: 18 MG/DL (ref 5–25)
CALCIUM SERPL-MCNC: 9.3 MG/DL (ref 8.3–10.1)
CHLORIDE SERPL-SCNC: 100 MMOL/L (ref 100–108)
CHOLEST SERPL-MCNC: 130 MG/DL (ref 50–200)
CO2 SERPL-SCNC: 28 MMOL/L (ref 21–32)
CREAT SERPL-MCNC: 0.93 MG/DL (ref 0.6–1.3)
GFR SERPL CREATININE-BSD FRML MDRD: 57 ML/MIN/1.73SQ M
GLUCOSE P FAST SERPL-MCNC: 92 MG/DL (ref 65–99)
HDLC SERPL-MCNC: 75 MG/DL
LDLC SERPL CALC-MCNC: 42 MG/DL (ref 0–100)
POTASSIUM SERPL-SCNC: 4.3 MMOL/L (ref 3.5–5.3)
PROT SERPL-MCNC: 8.4 G/DL (ref 6.4–8.2)
SODIUM SERPL-SCNC: 134 MMOL/L (ref 136–145)
TRIGL SERPL-MCNC: 64 MG/DL
TSH SERPL DL<=0.05 MIU/L-ACNC: 1.42 UIU/ML (ref 0.36–3.74)

## 2019-11-15 PROCEDURE — 36415 COLL VENOUS BLD VENIPUNCTURE: CPT

## 2019-11-15 PROCEDURE — 80061 LIPID PANEL: CPT

## 2019-11-15 PROCEDURE — 80053 COMPREHEN METABOLIC PANEL: CPT

## 2019-11-15 PROCEDURE — 84443 ASSAY THYROID STIM HORMONE: CPT

## 2019-11-15 NOTE — PROGRESS NOTES
Hearing Aid Evaluation  Name:  Colleen Lanes  :  1935  Age:  80 y o  Date of Evaluation: 19    Audiologic Results: Audiologic testing was performed on 10/16/19, testing revealed a mild sloping to severe SNHL bilaterally, right greater than left 1000, 1500 and 2000 Hz  Amplification is recommended binaurally  Today, binaural discrimination was completed at 68 dBHL  Her score is 96%  She had b/d confusion only  Hearing Aid Evaluation:  Hearing aid styles, technology, and price were discussed with the patient and her daughter  Possible hearing aid options, programs, and accessories were discussed  Pricing options and technology levels were discussed to determine the appropriate device to recommend  Recommendation: The first hearing aid recommendation is  Oticon OPN S 2 R minirites  The second hearing aid recommendation is  Oticon OPN S 3 R minirites  Mrs Bela Maloney will be trialing a pair of OPN S 3 minirites fit to step 2, minus 1 click   All instructions were completed with her daughter present  Recommendations:  1  RTO in 2 weeks to assess outcomes   2  When evaluating her, a small, round growth was seen on top of the right ear pinna  They were advised to seek medical assessment        Libra Hawkins , CCC-A, [place]# [id number], Hearing Aid Dispenser, [place]# [id number]  Clinical Audiologist

## 2019-11-15 NOTE — PROGRESS NOTES
Progress Note    Name:  Michelle Jimenez  :  1935  Age:  80 y o  Date of Evaluation: 11/15/19     Patient has decided to order the OPN S 3 R minirite hearing aids  The order will be placed and the fitting appointment was scheduled for 19 at 2 PM       Her family reported that she communicated better while riding in the back of the car and while on a call to her daughter (while on speaker phone)  As they do not have locks, watch L  for movement  Additionally, they have an appointment with Dr Gladys Crook (dermatology) next week for the growth at the top of the pinna         Libra Qiu , CCC-A, [place]# [id number], Hearing Aid Dispenser, [place]# [id number]  Clinical Audiologist

## 2019-12-09 ENCOUNTER — OFFICE VISIT (OUTPATIENT)
Dept: AUDIOLOGY | Facility: CLINIC | Age: 84
End: 2019-12-09
Payer: COMMERCIAL

## 2019-12-09 DIAGNOSIS — H90.3 SENSORY HEARING LOSS, BILATERAL: Primary | ICD-10-CM

## 2019-12-09 PROCEDURE — V5261 HEARING AID, DIGIT, BIN, BTE: HCPCS | Performed by: AUDIOLOGIST

## 2019-12-09 NOTE — LETTER
December 10, 2019     Bebe Damon, DO  [address] [address]    Patient: Wade Alan   YOB: 1935   Date of Visit: 2019       Dear Dr Darron Plasencia:     Wade Alan was seen today for a hearing aid fitting  Below are my notes for this consultation  If you have questions, please do not hesitate to call me  I look forward to following your patient along with you  Sincerely,        Libra Sesay , CCC/A  Clinical Audiologist   [place]  [id number]        CC: No Recipients  Braulio Paulino  12/10/2019  3:21 PM  Sign at close encounter       Hearing Aid Fitting    Name:  Wade Alan  :  1935  Age:  80 y o  Date of Evaluation: 19    Wade Alan is being see today to be fit with new hearing aids  Patient is fit with Oticon OPN S 3 minirite R hearing aid(s)  Right serial number [id number]  Left serial number [id number]  Warranty date: 22 (Loss/Damage and repair)  :  [id number]      Ear mold Battery  Dome   Right N/A RC # 2 85 no lock 6mm DB   Left N/A RC # 2 85 no lock  6mm DB      The hearing aid(s) were adjusted based on the patient's most recent audiogram demo settings  Patient noted good sound quality, and was happy with the fitting  Care and cleaning of the hearing aids was reviewed  Domes, filters, RC battery charger and user manual were reviewed with the patient and her daughter  Insertion and removal of the hearing aids was done  The patient practiced insertion and removal of the devices in the office, they demonstrated good ability to manipulate the hearing aids  Telephone use was reviewed with the patient  The patient expressed satisfaction with the hearing aids  The growth on top of her right pinna has been removed by Dr Saad Sepulveda   It was reported to be a "clogged pore"  The incision has healed and it is difficult to see where the growth was initially  Recommendation:   1   As the patient has been utilizing the parmjit for a few weeks, she was scheduled for follow-up prior to the completion of the trial period for 1/14/20        Libra Carson , CCC-A, [place]# [id number], Hearing Aid Dispenser, [place]# [id number]  Clinical Audiologist

## 2019-12-10 NOTE — PROGRESS NOTES
Hearing Aid Fitting    Name:  Scout Landaverde  :  1935  Age:  80 y o  Date of Evaluation: 19    Scout Landaverde is being see today to be fit with new hearing aids  Patient is fit with Oticon OPN S 3 minirite R hearing aid(s)  Right serial number [id number]  Left serial number [id number]  Warranty date: 22 (Loss/Damage and repair)  :  0661057      Ear mold Battery  Dome   Right N/A RC # 2 85 no lock 6mm DB   Left N/A RC # 2 85 no lock  6mm DB      The hearing aid(s) were adjusted based on the patient's most recent audiogram demo settings  Patient noted good sound quality, and was happy with the fitting  Care and cleaning of the hearing aids was reviewed  Domes, filters, RC battery charger and user manual were reviewed with the patient and her daughter  Insertion and removal of the hearing aids was done  The patient practiced insertion and removal of the devices in the office, they demonstrated good ability to manipulate the hearing aids  Telephone use was reviewed with the patient  The patient expressed satisfaction with the hearing aids  The growth on top of her right pinna has been removed by Dr Stout Most   It was reported to be a "clogged pore"  The incision has healed and it is difficult to see where the growth was initially  Recommendation:   1  As the patient has been utilizing the demos for a few weeks, she was scheduled for follow-up prior to the completion of the trial period for 20        Libra Heredia , CCC-A, [place]# [id number], Hearing Aid Dispenser, [place]# [id number]  Clinical Audiologist

## 2019-12-16 ENCOUNTER — OFFICE VISIT (OUTPATIENT)
Dept: PODIATRY | Facility: CLINIC | Age: 84
End: 2019-12-16
Payer: MEDICARE

## 2019-12-16 VITALS
SYSTOLIC BLOOD PRESSURE: 132 MMHG | HEIGHT: 59 IN | BODY MASS INDEX: 25.2 KG/M2 | RESPIRATION RATE: 16 BRPM | DIASTOLIC BLOOD PRESSURE: 79 MMHG | WEIGHT: 125 LBS | HEART RATE: 74 BPM

## 2019-12-16 DIAGNOSIS — I70.209 PERIPHERAL ARTERIOSCLEROSIS (HCC): Primary | ICD-10-CM

## 2019-12-16 DIAGNOSIS — M79.671 PAIN IN BOTH FEET: ICD-10-CM

## 2019-12-16 DIAGNOSIS — M79.672 PAIN IN BOTH FEET: ICD-10-CM

## 2019-12-16 DIAGNOSIS — L84 CORNS: ICD-10-CM

## 2019-12-16 PROCEDURE — 11056 PARNG/CUTG B9 HYPRKR LES 2-4: CPT | Performed by: PODIATRIST

## 2020-01-07 ENCOUNTER — OFFICE VISIT (OUTPATIENT)
Dept: FAMILY MEDICINE CLINIC | Facility: CLINIC | Age: 85
End: 2020-01-07
Payer: MEDICARE

## 2020-01-07 VITALS
WEIGHT: 123 LBS | HEART RATE: 72 BPM | RESPIRATION RATE: 16 BRPM | SYSTOLIC BLOOD PRESSURE: 124 MMHG | BODY MASS INDEX: 24.8 KG/M2 | HEIGHT: 59 IN | DIASTOLIC BLOOD PRESSURE: 64 MMHG

## 2020-01-07 DIAGNOSIS — E78.2 MIXED HYPERLIPIDEMIA: ICD-10-CM

## 2020-01-07 DIAGNOSIS — J06.9 VIRAL URI WITH COUGH: ICD-10-CM

## 2020-01-07 DIAGNOSIS — I10 ESSENTIAL HYPERTENSION: Primary | ICD-10-CM

## 2020-01-07 DIAGNOSIS — I70.209 PERIPHERAL ARTERIOSCLEROSIS (HCC): ICD-10-CM

## 2020-01-07 DIAGNOSIS — M25.551 HIP PAIN, ACUTE, RIGHT: ICD-10-CM

## 2020-01-07 DIAGNOSIS — I49.5 SINUS BRADY-TACHY SYNDROME (HCC): ICD-10-CM

## 2020-01-07 PROCEDURE — 99214 OFFICE O/P EST MOD 30 MIN: CPT | Performed by: FAMILY MEDICINE

## 2020-01-07 RX ORDER — BENZONATATE 200 MG/1
200 CAPSULE ORAL 3 TIMES DAILY PRN
Qty: 30 CAPSULE | Refills: 0 | Status: SHIPPED | OUTPATIENT
Start: 2020-01-07 | End: 2020-03-11

## 2020-01-07 NOTE — PROGRESS NOTES
Assessment/Plan:    1  Essential hypertension  Assessment & Plan:  stable    Orders:  -     Comprehensive metabolic panel; Future; Expected date: 06/20/2020  -     CBC; Future; Expected date: 06/20/2020  -     Lipid Panel with Direct LDL reflex; Future; Expected date: 06/20/2020    2  Mixed hyperlipidemia  -     Comprehensive metabolic panel; Future; Expected date: 06/20/2020  -     CBC; Future; Expected date: 06/20/2020  -     Lipid Panel with Direct LDL reflex; Future; Expected date: 06/20/2020    3  Hip pain, acute, right  -     XR hip/pelv 2-3 vws right if performed; Future; Expected date: 01/07/2020  -     Comprehensive metabolic panel; Future; Expected date: 06/20/2020  -     CBC; Future; Expected date: 06/20/2020  -     Lipid Panel with Direct LDL reflex; Future; Expected date: 06/20/2020    4  Viral URI with cough  -     benzonatate (TESSALON) 200 MG capsule; Take 1 capsule (200 mg total) by mouth 3 (three) times a day as needed for cough  -     Comprehensive metabolic panel; Future; Expected date: 06/20/2020  -     CBC; Future; Expected date: 06/20/2020  -     Lipid Panel with Direct LDL reflex; Future; Expected date: 06/20/2020    5  Peripheral arteriosclerosis ([facility] [address] )  -     Comprehensive metabolic panel; Future; Expected date: 06/20/2020  -     CBC; Future; Expected date: 06/20/2020  -     Lipid Panel with Direct LDL reflex; Future; Expected date: 06/20/2020    6  Sinus luis angel-tachy syndrome (HCC)  -     Comprehensive metabolic panel; Future; Expected date: 06/20/2020  -     CBC; Future; Expected date: 06/20/2020  -     Lipid Panel with Direct LDL reflex; Future; Expected date: 06/20/2020          There are no Patient Instructions on file for this visit  Return in about 6 months (around 7/7/2020) for Recheck  Subjective:      Patient ID: Smith Fried is a 80 y o  female      Chief Complaint   Patient presents with    Follow-up     newest labs--adolph       Pt is here for a 3 month follow up  Pt denies CP, no SOB    Pt states she woke up yesterday with a pain in her rt hip - seems to be spreading some  Pain is 5/10, makes walking difficult  No trauma no fall    Never lightheaded never dizzy    Pt states she has phlem with come cough - pt has post nasal drip - the cold seemes to have strted yesterday    Pt denies palpitations        The following portions of the patient's history were reviewed and updated as appropriate: allergies, current medications, past family history, past medical history, past social history, past surgical history and problem list     Review of Systems   Constitutional: Negative  Negative for activity change, appetite change, chills, diaphoresis and fatigue  HENT: Positive for congestion and postnasal drip  Negative for dental problem, ear pain, sinus pressure and sore throat  Eyes: Negative  Negative for photophobia, pain, discharge, redness, itching and visual disturbance  Respiratory: Positive for cough  Negative for apnea and chest tightness  Cardiovascular: Negative  Negative for chest pain, palpitations and leg swelling  Gastrointestinal: Negative  Negative for abdominal distention, abdominal pain, constipation and diarrhea  Endocrine: Negative  Negative for cold intolerance and heat intolerance  Genitourinary: Negative  Negative for difficulty urinating and dyspareunia  Musculoskeletal: Positive for arthralgias and myalgias  Negative for back pain  Skin: Negative  Allergic/Immunologic: Negative for environmental allergies  Neurological: Negative  Negative for dizziness  Psychiatric/Behavioral: Negative  Negative for agitation           Current Outpatient Medications   Medication Sig Dispense Refill    amLODIPine (NORVASC) 5 mg tablet Take 1 tablet (5 mg total) by mouth daily 90 tablet 1    aspirin 325 mg tablet Take 325 mg by mouth daily      atorvastatin (LIPITOR) 10 mg tablet Take 1 tablet (10 mg total) by mouth daily 90 tablet 1    famotidine (PEPCID) 20 mg tablet Take 20 mg by mouth 2 (two) times a day      losartan (COZAAR) 25 mg tablet Take 1 tablet (25 mg total) by mouth daily 90 tablet 1    LUMIGAN 0 01 % ophthalmic drops       metoprolol tartrate (LOPRESSOR) 25 mg tablet Take 0 5 tablets (12 5 mg total) by mouth 2 (two) times a day 90 tablet 1    timolol (TIMOPTIC-XE) 0 5 % ophthalmic gel-forming       benzonatate (TESSALON) 200 MG capsule Take 1 capsule (200 mg total) by mouth 3 (three) times a day as needed for cough 30 capsule 0     No current facility-administered medications for this visit  Objective:    /64   Pulse 72   Resp 16   Ht 4' 11" (1 499 m)   Wt 55 8 kg (123 lb)   BMI 24 84 kg/m²        Physical Exam   Constitutional: She appears well-developed and well-nourished  No distress  HENT:   Head: Normocephalic and atraumatic  Right Ear: External ear normal    Left Ear: External ear normal    Nose: Mucosal edema present  Mouth/Throat: Oropharynx is clear and moist  No oropharyngeal exudate  Eyes: Pupils are equal, round, and reactive to light  EOM are normal  Right eye exhibits no discharge  Left eye exhibits no discharge  No scleral icterus  Neck: No thyromegaly present  Cardiovascular: Normal rate and normal heart sounds  No murmur heard  Pulmonary/Chest: Effort normal and breath sounds normal  No respiratory distress  She has no wheezes  Abdominal: Soft  Bowel sounds are normal  She exhibits no distension and no mass  There is no tenderness  There is no rebound and no guarding  Musculoskeletal: Normal range of motion  creps in rt hip   Neurological: She is alert  She displays normal reflexes  Coordination normal    Skin: Skin is warm and dry  No rash noted  She is not diaphoretic  No erythema  Psychiatric: She has a normal mood and affect  Her behavior is normal    Nursing note and vitals reviewed             Recent Results (from the past 2016 hour(s))   Comprehensive metabolic panel Collection Time: 11/15/19  7:48 AM   Result Value Ref Range    Sodium 134 (L) 136 - 145 mmol/L    Potassium 4 3 3 5 - 5 3 mmol/L    Chloride 100 100 - 108 mmol/L    CO2 28 21 - 32 mmol/L    ANION GAP 6 4 - 13 mmol/L    BUN 18 5 - 25 mg/dL    Creatinine 0 93 0 60 - 1 30 mg/dL    Glucose, Fasting 92 65 - 99 mg/dL    Calcium 9 3 8 3 - 10 1 mg/dL    AST 22 5 - 45 U/L    ALT 19 12 - 78 U/L    Alkaline Phosphatase 104 46 - 116 U/L    Total Protein 8 4 (H) 6 4 - 8 2 g/dL    Albumin 4 0 3 5 - 5 0 g/dL    Total Bilirubin 0 55 0 20 - 1 00 mg/dL    eGFR 57 ml/min/1 73sq m   Lipid Panel with Direct LDL reflex    Collection Time: 11/15/19  7:48 AM   Result Value Ref Range    Cholesterol 130 50 - 200 mg/dL    Triglycerides 64 <=150 mg/dL    HDL, Direct 75 >=40 mg/dL    LDL Calculated 42 0 - 100 mg/dL   TSH, 3rd generation    Collection Time: 11/15/19  7:48 AM   Result Value Ref Range    TSH 3RD GENERATON 1 420 0 358 - 3 740 uIU/mL         Samaria Ocampo DO

## 2020-01-14 ENCOUNTER — OFFICE VISIT (OUTPATIENT)
Dept: AUDIOLOGY | Facility: CLINIC | Age: 85
End: 2020-01-14

## 2020-01-14 DIAGNOSIS — H90.3 SENSORY HEARING LOSS, BILATERAL: Primary | ICD-10-CM

## 2020-01-14 NOTE — PROGRESS NOTES
Hearing Aid Visit:    Name:  Smith Fried  :  1935  Age:  80 y o  Date of Evaluation: 20     Smith Fried is being seen for a hearing aid visit one month post hearing aid fitting  Patient is fit with [employer] OPN S 3 minirite R hearing aid(s)  Right serial number [id number]  Left serial number [id number]  Warranty date: 22 (Loss/Damage and repair)  :  [id number]     Patient and daughter in law report that she is hearing well with the current settings  She is responding better and engaging better in conversations  There are no reported issues with fit  Action:  1  Went over wax guard and dome maintenance   2  Checked data:  Using an average of 4 hours / day; discussed  She will sometimes not wear when at home alone  Set to step 2-3 in 4 months  Recommendations:   1  RTO in  for follow-up/ family wants to wait until May to schedule; she will put it on her calendar to call      2  Patient paid balance of aids in office with Libra Bedolla , [facility], [place]# [id number], [facility], [place]# [id number]  Clinical Audiologist

## 2020-02-24 DIAGNOSIS — I10 ESSENTIAL HYPERTENSION: ICD-10-CM

## 2020-02-24 RX ORDER — AMLODIPINE BESYLATE 5 MG/1
TABLET ORAL
Qty: 90 TABLET | Refills: 1 | Status: SHIPPED | OUTPATIENT
Start: 2020-02-24 | End: 2020-07-22

## 2020-02-25 ENCOUNTER — OFFICE VISIT (OUTPATIENT)
Dept: PODIATRY | Facility: CLINIC | Age: 85
End: 2020-02-25
Payer: MEDICARE

## 2020-02-25 VITALS
WEIGHT: 123 LBS | SYSTOLIC BLOOD PRESSURE: 125 MMHG | BODY MASS INDEX: 24.8 KG/M2 | HEIGHT: 59 IN | DIASTOLIC BLOOD PRESSURE: 70 MMHG

## 2020-02-25 DIAGNOSIS — M21.962 ACQUIRED DEFORMITY OF LEFT FOOT: ICD-10-CM

## 2020-02-25 DIAGNOSIS — M79.672 PAIN IN BOTH FEET: ICD-10-CM

## 2020-02-25 DIAGNOSIS — M79.671 PAIN IN BOTH FEET: ICD-10-CM

## 2020-02-25 DIAGNOSIS — L84 CORNS: ICD-10-CM

## 2020-02-25 DIAGNOSIS — M20.42 HAMMER TOE OF LEFT FOOT: ICD-10-CM

## 2020-02-25 DIAGNOSIS — I70.209 PERIPHERAL ARTERIOSCLEROSIS (HCC): Primary | ICD-10-CM

## 2020-02-25 PROCEDURE — 11056 PARNG/CUTG B9 HYPRKR LES 2-4: CPT | Performed by: PODIATRIST

## 2020-02-25 PROCEDURE — 29550 STRAPPING OF TOES: CPT | Performed by: PODIATRIST

## 2020-02-25 NOTE — PROGRESS NOTES
Assessment/Plan:  Pain   Metatarsalgia   Peripheral artery disease   Bunion formation   Callus formation   Mycotic toenail  Clawtoe     Plan   Foot exam performed   Patient educated on conditions   All mycotic nails debrided   Plantar lesions debrided as well  Toes of left foot splinted in a rectus position      No problem-specific Assessment & Plan notes found for this encounter          There are no diagnoses linked to this encounter        Subjective:  Patient complains of pain in her feet   Patient has pain with ambulation   No history of trauma   She has pain with shoe wear  No past medical history on file      No past surgical history on file              Allergies   Allergen Reactions    Cephalexin              Current Outpatient Prescriptions:     metoprolol tartrate (LOPRESSOR) 50 mg tablet, Take 50 mg by mouth 2 (two) times a day, Disp: , Rfl:     amLODIPine (NORVASC) 5 mg tablet, Take 5 mg by mouth daily, Disp: , Rfl:     aspirin 325 mg tablet, Take 325 mg by mouth daily, Disp: , Rfl:     atorvastatin (LIPITOR) 10 mg tablet, Take 10 mg by mouth daily, Disp: , Rfl:     losartan (COZAAR) 50 mg tablet, , Disp: , Rfl:     LUMIGAN 0 01 % ophthalmic drops, , Disp: , Rfl:      There is no problem list on file for this patient             Patient ID: Rea Huizar is a 80 y  o  female      HPI     The following portions of the patient's history were reviewed and updated as appropriate: allergies, current medications, past family history, past medical history, past social history, past surgical history and problem list      Review of Systems       Objective:  Patient's shoes and socks removed    Foot Exam     General  General Appearance: appears stated age and healthy   Orientation: alert and oriented to person, place, and time   Affect: appropriate   Gait: antalgic         Right Foot/Ankle      Inspection and Palpation  Ecchymosis: none  Tenderness: metatarsals   Swelling: metatarsals   Arch: pes planus  Hammertoes: fifth toe   234 toes of the right foot are clawed  Hallux valgus: yes  Hallux limitus: yes  Skin Exam: callus;      Neurovascular  Dorsalis pedis: 1+  Posterior tibial: 1+  Superficial peroneal nerve sensation: diminished  Deep peroneal nerve sensation: diminished  Sural nerve sensation: diminished  Achilles reflex: 2+     Muscle Strength  Ankle dorsiflexion: 4+        Left Foot/Ankle       Inspection and Palpation  Tenderness: metatarsals   Swelling: metatarsals   Arch: pes planus  Hammertoes: fifth toe  Toes 234 are clawed a period  Hallux valgus: yes  Hallux limitus: yes     Neurovascular  Dorsalis pedis: 1+  Superficial peroneal nerve sensation: diminished  Deep peroneal nerve sensation: diminished  Sural nerve sensation: diminished  Achilles reflex: 2+     Muscle Strength  Ankle dorsiflexion: 4+        Physical Exam   Constitutional: She appears well-developed and well-nourished  Cardiovascular: Normal rate and regular rhythm     Pulses:       Dorsalis pedis pulses are 1+ on the right side, and 1+ on the left side         Posterior tibial pulses are 1+ on the right side  Q 9 findings noted bilateral   Negative digital hair noted   Positive abnormal cooling    Feet:   Right Foot:   Skin Integrity: Positive for callus  Neurological:   Reflex Scores:       Achilles reflexes are 2+ on the right side and 2+ on the left side  Skin:   Tylenol submetatarsal 2 noted   This is bilateral     Severe thickened mycotic toenail noted bilateral   Nails are yellow with debris   Positive malodor noted     Distal clavi by 2nd 3rd and 4th toes of the left foot    Nursing note and vitals reviewed

## 2020-03-04 DIAGNOSIS — E78.2 MIXED HYPERLIPIDEMIA: ICD-10-CM

## 2020-03-04 RX ORDER — ATORVASTATIN CALCIUM 10 MG/1
TABLET, FILM COATED ORAL
Qty: 90 TABLET | Refills: 1 | Status: SHIPPED | OUTPATIENT
Start: 2020-03-04 | End: 2020-07-22

## 2020-03-11 ENCOUNTER — OFFICE VISIT (OUTPATIENT)
Dept: FAMILY MEDICINE CLINIC | Facility: CLINIC | Age: 85
End: 2020-03-11
Payer: MEDICARE

## 2020-03-11 VITALS
OXYGEN SATURATION: 97 % | DIASTOLIC BLOOD PRESSURE: 70 MMHG | TEMPERATURE: 97.6 F | SYSTOLIC BLOOD PRESSURE: 130 MMHG | WEIGHT: 126 LBS | HEART RATE: 58 BPM | HEIGHT: 59 IN | BODY MASS INDEX: 25.4 KG/M2 | RESPIRATION RATE: 16 BRPM

## 2020-03-11 DIAGNOSIS — R53.1 WEAKNESS: ICD-10-CM

## 2020-03-11 DIAGNOSIS — I10 ESSENTIAL HYPERTENSION: Primary | ICD-10-CM

## 2020-03-11 PROCEDURE — 3075F SYST BP GE 130 - 139MM HG: CPT | Performed by: FAMILY MEDICINE

## 2020-03-11 PROCEDURE — 4040F PNEUMOC VAC/ADMIN/RCVD: CPT | Performed by: FAMILY MEDICINE

## 2020-03-11 PROCEDURE — 1160F RVW MEDS BY RX/DR IN RCRD: CPT | Performed by: FAMILY MEDICINE

## 2020-03-11 PROCEDURE — 99213 OFFICE O/P EST LOW 20 MIN: CPT | Performed by: FAMILY MEDICINE

## 2020-03-11 PROCEDURE — 3008F BODY MASS INDEX DOCD: CPT | Performed by: FAMILY MEDICINE

## 2020-03-11 PROCEDURE — 1036F TOBACCO NON-USER: CPT | Performed by: FAMILY MEDICINE

## 2020-03-11 PROCEDURE — 3078F DIAST BP <80 MM HG: CPT | Performed by: FAMILY MEDICINE

## 2020-03-11 NOTE — PROGRESS NOTES
Assessment/Plan:    1  Essential hypertension  Assessment & Plan:  stable    Orders:  -     CBC; Future  -     Comprehensive metabolic panel; Future  -     UA (URINE) with reflex to Scope  -     TSH, 3rd generation; Future    2  Weakness  -     CBC; Future  -     Comprehensive metabolic panel; Future  -     UA (URINE) with reflex to Scope  -     TSH, 3rd generation; Future          There are no Patient Instructions on file for this visit  No follow-ups on file  Subjective:      Patient ID: Scout Landaverde is a 80 y o  female  Chief Complaint   Patient presents with    Cold Like Symptoms     wmcma    Cough       I shere for a same day appt for a cold    States her legs are whoosy  Feels groogy  Pt does not have fever  Her huisband is sick in a facility   Daughter stated they wanted to come in to make sure she was alright  Pt reports minor cough  Throat is a little sore  Some post nasal drip  Took some cough drops      The following portions of the patient's history were reviewed and updated as appropriate: allergies, current medications, past family history, past medical history, past social history, past surgical history and problem list     Review of Systems   Constitutional: Negative  Negative for activity change, appetite change, chills, diaphoresis and fatigue  HENT: Positive for congestion  Negative for dental problem, ear pain, sinus pressure and sore throat  Eyes: Negative  Negative for photophobia, pain, discharge, redness, itching and visual disturbance  Respiratory: Positive for cough  Negative for apnea and chest tightness  Cardiovascular: Negative  Negative for chest pain, palpitations and leg swelling  Gastrointestinal: Negative  Negative for abdominal distention, abdominal pain, constipation and diarrhea  Endocrine: Negative  Negative for cold intolerance and heat intolerance  Genitourinary: Negative  Negative for difficulty urinating and dyspareunia     Musculoskeletal: Negative  Negative for arthralgias and back pain  Skin: Negative  Allergic/Immunologic: Negative for environmental allergies  Neurological: Negative  Negative for dizziness  Psychiatric/Behavioral: Negative  Negative for agitation  Current Outpatient Medications   Medication Sig Dispense Refill    amLODIPine (NORVASC) 5 mg tablet TAKE ONE TABLET BY MOUTH EVERY DAY 90 tablet 1    aspirin 325 mg tablet Take 325 mg by mouth daily      atorvastatin (LIPITOR) 10 mg tablet TAKE ONE TABLET BY MOUTH EVERY DAY 90 tablet 1    famotidine (PEPCID) 20 mg tablet Take 20 mg by mouth 2 (two) times a day      losartan (COZAAR) 25 mg tablet Take 1 tablet (25 mg total) by mouth daily 90 tablet 1    LUMIGAN 0 01 % ophthalmic drops       metoprolol tartrate (LOPRESSOR) 25 mg tablet Take 0 5 tablets (12 5 mg total) by mouth 2 (two) times a day 90 tablet 1    timolol (TIMOPTIC-XE) 0 5 % ophthalmic gel-forming        No current facility-administered medications for this visit  Objective:    /70   Pulse 58   Temp 97 6 °F (36 4 °C)   Resp 16   Ht 4' 11" (1 499 m)   Wt 57 2 kg (126 lb)   SpO2 97%   BMI 25 45 kg/m²        Physical Exam   Constitutional: She appears well-developed and well-nourished  No distress  HENT:   Head: Normocephalic and atraumatic  Right Ear: External ear normal    Left Ear: External ear normal    Nose: Mucosal edema present  Mouth/Throat: Oropharynx is clear and moist  No oropharyngeal exudate  Eyes: Pupils are equal, round, and reactive to light  EOM are normal  Right eye exhibits no discharge  Left eye exhibits no discharge  No scleral icterus  Neck: No thyromegaly present  Cardiovascular: Normal rate and normal heart sounds  No murmur heard  Pulmonary/Chest: Effort normal and breath sounds normal  No respiratory distress  She has no wheezes  Abdominal: Soft  Bowel sounds are normal  She exhibits no distension and no mass  There is no tenderness  There is no rebound and no guarding  Musculoskeletal: Normal range of motion  Neurological: She is alert  She displays normal reflexes  Coordination normal    Skin: Skin is warm and dry  No rash noted  She is not diaphoretic  No erythema  Psychiatric: She has a normal mood and affect  Her behavior is normal    Nursing note and vitals reviewed               Juan M Carlos DO

## 2020-03-27 DIAGNOSIS — I10 ESSENTIAL HYPERTENSION: ICD-10-CM

## 2020-03-27 RX ORDER — LOSARTAN POTASSIUM 25 MG/1
TABLET ORAL
Qty: 90 TABLET | Refills: 1 | Status: SHIPPED | OUTPATIENT
Start: 2020-03-27 | End: 2020-08-06 | Stop reason: SDUPTHER

## 2020-06-30 ENCOUNTER — OFFICE VISIT (OUTPATIENT)
Dept: PODIATRY | Facility: CLINIC | Age: 85
End: 2020-06-30
Payer: MEDICARE

## 2020-06-30 VITALS
HEIGHT: 59 IN | DIASTOLIC BLOOD PRESSURE: 70 MMHG | WEIGHT: 126 LBS | SYSTOLIC BLOOD PRESSURE: 130 MMHG | HEART RATE: 58 BPM | RESPIRATION RATE: 16 BRPM | BODY MASS INDEX: 25.4 KG/M2

## 2020-06-30 DIAGNOSIS — M79.672 PAIN IN BOTH FEET: ICD-10-CM

## 2020-06-30 DIAGNOSIS — M79.671 PAIN IN BOTH FEET: ICD-10-CM

## 2020-06-30 DIAGNOSIS — L84 CORNS: ICD-10-CM

## 2020-06-30 DIAGNOSIS — I70.209 PERIPHERAL ARTERIOSCLEROSIS (HCC): Primary | ICD-10-CM

## 2020-06-30 PROCEDURE — 11057 PARNG/CUTG B9 HYPRKR LES >4: CPT | Performed by: PODIATRIST

## 2020-07-07 ENCOUNTER — APPOINTMENT (OUTPATIENT)
Dept: LAB | Facility: CLINIC | Age: 85
End: 2020-07-07
Payer: MEDICARE

## 2020-07-07 DIAGNOSIS — R53.1 WEAKNESS: ICD-10-CM

## 2020-07-07 DIAGNOSIS — I10 ESSENTIAL HYPERTENSION: ICD-10-CM

## 2020-07-07 DIAGNOSIS — M25.551 HIP PAIN, ACUTE, RIGHT: ICD-10-CM

## 2020-07-07 DIAGNOSIS — I49.5 SINUS BRADY-TACHY SYNDROME (HCC): ICD-10-CM

## 2020-07-07 DIAGNOSIS — E78.2 MIXED HYPERLIPIDEMIA: ICD-10-CM

## 2020-07-07 DIAGNOSIS — I70.209 PERIPHERAL ARTERIOSCLEROSIS (HCC): ICD-10-CM

## 2020-07-07 DIAGNOSIS — J06.9 VIRAL URI WITH COUGH: ICD-10-CM

## 2020-07-07 LAB
ALBUMIN SERPL BCP-MCNC: 3.5 G/DL (ref 3.5–5)
ALP SERPL-CCNC: 113 U/L (ref 46–116)
ALT SERPL W P-5'-P-CCNC: 17 U/L (ref 12–78)
ANION GAP SERPL CALCULATED.3IONS-SCNC: 5 MMOL/L (ref 4–13)
AST SERPL W P-5'-P-CCNC: 20 U/L (ref 5–45)
BILIRUB SERPL-MCNC: 0.4 MG/DL (ref 0.2–1)
BILIRUB UR QL STRIP: NEGATIVE
BUN SERPL-MCNC: 17 MG/DL (ref 5–25)
CALCIUM SERPL-MCNC: 8.6 MG/DL (ref 8.3–10.1)
CHLORIDE SERPL-SCNC: 99 MMOL/L (ref 100–108)
CHOLEST SERPL-MCNC: 134 MG/DL (ref 50–200)
CLARITY UR: NORMAL
CO2 SERPL-SCNC: 27 MMOL/L (ref 21–32)
COLOR UR: YELLOW
CREAT SERPL-MCNC: 0.91 MG/DL (ref 0.6–1.3)
ERYTHROCYTE [DISTWIDTH] IN BLOOD BY AUTOMATED COUNT: 14.5 % (ref 11.6–15.1)
GFR SERPL CREATININE-BSD FRML MDRD: 58 ML/MIN/1.73SQ M
GLUCOSE P FAST SERPL-MCNC: 87 MG/DL (ref 65–99)
GLUCOSE UR STRIP-MCNC: NEGATIVE MG/DL
HCT VFR BLD AUTO: 39.4 % (ref 34.8–46.1)
HDLC SERPL-MCNC: 70 MG/DL
HGB BLD-MCNC: 12.5 G/DL (ref 11.5–15.4)
HGB UR QL STRIP.AUTO: NEGATIVE
KETONES UR STRIP-MCNC: NEGATIVE MG/DL
LDLC SERPL CALC-MCNC: 48 MG/DL (ref 0–100)
LEUKOCYTE ESTERASE UR QL STRIP: NEGATIVE
MCH RBC QN AUTO: 29.8 PG (ref 26.8–34.3)
MCHC RBC AUTO-ENTMCNC: 31.7 G/DL (ref 31.4–37.4)
MCV RBC AUTO: 94 FL (ref 82–98)
NITRITE UR QL STRIP: NEGATIVE
PH UR STRIP.AUTO: 7 [PH]
PLATELET # BLD AUTO: 165 THOUSANDS/UL (ref 149–390)
PMV BLD AUTO: 10.4 FL (ref 8.9–12.7)
POTASSIUM SERPL-SCNC: 4.4 MMOL/L (ref 3.5–5.3)
PROT SERPL-MCNC: 8 G/DL (ref 6.4–8.2)
PROT UR STRIP-MCNC: NEGATIVE MG/DL
RBC # BLD AUTO: 4.19 MILLION/UL (ref 3.81–5.12)
SODIUM SERPL-SCNC: 131 MMOL/L (ref 136–145)
SP GR UR STRIP.AUTO: 1.01 (ref 1–1.03)
TRIGL SERPL-MCNC: 78 MG/DL
TSH SERPL DL<=0.05 MIU/L-ACNC: 1.65 UIU/ML (ref 0.36–3.74)
UROBILINOGEN UR QL STRIP.AUTO: 0.2 E.U./DL
WBC # BLD AUTO: 4.08 THOUSAND/UL (ref 4.31–10.16)

## 2020-07-07 PROCEDURE — 80053 COMPREHEN METABOLIC PANEL: CPT

## 2020-07-07 PROCEDURE — 85027 COMPLETE CBC AUTOMATED: CPT

## 2020-07-07 PROCEDURE — 36415 COLL VENOUS BLD VENIPUNCTURE: CPT

## 2020-07-07 PROCEDURE — 80061 LIPID PANEL: CPT

## 2020-07-07 PROCEDURE — 81003 URINALYSIS AUTO W/O SCOPE: CPT | Performed by: FAMILY MEDICINE

## 2020-07-07 PROCEDURE — 84443 ASSAY THYROID STIM HORMONE: CPT

## 2020-07-22 ENCOUNTER — CONSULT (OUTPATIENT)
Dept: CARDIOLOGY CLINIC | Facility: CLINIC | Age: 85
End: 2020-07-22
Payer: MEDICARE

## 2020-07-22 VITALS
OXYGEN SATURATION: 98 % | TEMPERATURE: 98.8 F | SYSTOLIC BLOOD PRESSURE: 126 MMHG | WEIGHT: 125 LBS | HEART RATE: 58 BPM | DIASTOLIC BLOOD PRESSURE: 70 MMHG | BODY MASS INDEX: 25.2 KG/M2 | HEIGHT: 59 IN

## 2020-07-22 DIAGNOSIS — I35.9 AORTIC VALVE DISEASE: ICD-10-CM

## 2020-07-22 DIAGNOSIS — E78.2 MIXED HYPERLIPIDEMIA: ICD-10-CM

## 2020-07-22 DIAGNOSIS — I44.0 HEART BLOCK, FIRST DEGREE: Primary | ICD-10-CM

## 2020-07-22 PROCEDURE — 99204 OFFICE O/P NEW MOD 45 MIN: CPT | Performed by: INTERNAL MEDICINE

## 2020-07-22 PROCEDURE — 93000 ELECTROCARDIOGRAM COMPLETE: CPT | Performed by: INTERNAL MEDICINE

## 2020-07-22 RX ORDER — ASPIRIN 81 MG/1
81 TABLET ORAL DAILY
Status: ON HOLD
Start: 2020-07-22 | End: 2020-12-09 | Stop reason: SDUPTHER

## 2020-07-22 NOTE — PROGRESS NOTES
[facility]  [address]  [address]   [place], [address] [place]    Cardiology Consultation    Adrianna Sage  [id number]  1935      Consult for: AVR  Appreciate consult by: Tony Rob DO      Discussion/Summary:     1  Heart block, first degree  POCT ECG   2  Aortic valve disease  POCT ECG    Echo complete with contrast if indicated    aspirin (ECOTRIN LOW STRENGTH) 81 mg EC tablet   3  Mixed hyperlipidemia  POCT ECG      - Ms  Gaye has bilateral lower extremity edema present on exam today  This may be secondary to amlodipine or from congestion  Will discontinue amlodipine   - 2D echocardiogram will be obtained to evaluate LV function and filling pressures  - reduce aspirin to 81 mg daily  - may discontinue atorvastatin  Her last LDL cholesterol was 48  The benefits of statins in octogenarians for primary prevention is limited  May repeat lipid panel in 6 months to reassess lipid levels  May restart statin if LDL greater than 100 mg/dL  - discussed with patient and her daughter in detail  Patient will monitor home blood pressure and keep a log  Will review next month along with echocardiogram results  HPI:     Adrianna Sage is a 80 y o  here to establish care as she has a history of aortic valve replacement  Surgery was done 5 years ago at [facility]  She was following with a cardiologist at [facility] but has not seen in over a year  She denies any recent echocardiogram   Records from her previous cardiologist were reviewed through epic system  In June 2015, she had aortic valve replacement for aortic valve stenosis  She has no history of coronary artery disease  She previously was on metoprolol 25 mg b i d  But was reduced to 12 5 mg b i d  Because of bradycardia  She denies any complaints at this time but does have bilateral lower extremity edema present  She uses compression stockings but has not been using due to the heat  She denies any orthopnea or paroxysmal nocturnal dyspnea  She denies any syncope or near syncope  Past Medical History:   Diagnosis Date    Aortic valve disease     Heart block, first degree     Hyperlipidemia     Hypertension     Sinus luis angel-tachy syndrome (HCC)     SSS (sick sinus syndrome) (HCC)      Social History     Tobacco Use    Smoking status: Never Smoker    Smokeless tobacco: Never Used   Substance Use Topics    Alcohol use: Not Currently    Drug use: Not on file      Family History   Problem Relation Age of Onset    Heart disease Mother     Hypertension Mother     Diabetes Mother     Heart disease Father     Heart attack Father          at 54    Mental illness Neg Hx      Past Surgical History:   Procedure Laterality Date    AORTIC VALVE REPLACEMENT  2015    Porcine valve       Current Outpatient Medications:     famotidine (PEPCID) 20 mg tablet, Take 20 mg by mouth daily , Disp: , Rfl:     losartan (COZAAR) 25 mg tablet, TAKE ONE TABLET BY MOUTH EVERY DAY, Disp: 90 tablet, Rfl: 1    LUMIGAN 0 01 % ophthalmic drops, , Disp: , Rfl:     metoprolol tartrate (LOPRESSOR) 25 mg tablet, Take 0 5 tablets (12 5 mg total) by mouth 2 (two) times a day, Disp: 90 tablet, Rfl: 1    timolol (TIMOPTIC-XE) 0 5 % ophthalmic gel-forming, , Disp: , Rfl:     aspirin (ECOTRIN LOW STRENGTH) 81 mg EC tablet, Take 1 tablet (81 mg total) by mouth daily, Disp: , Rfl:   Allergies   Allergen Reactions    Cephalexin        Review of Systems:   Review of Systems   Constitutional: Negative for chills, fatigue and fever  HENT: Negative for congestion, nosebleeds and postnasal drip  Respiratory: Positive for shortness of breath  Negative for cough and chest tightness  Cardiovascular: Positive for leg swelling  Negative for chest pain and palpitations  Gastrointestinal: Negative for abdominal distention, abdominal pain, diarrhea, nausea and vomiting     Endocrine: Negative for polydipsia, polyphagia and polyuria  Musculoskeletal: Negative for gait problem and myalgias  Skin: Negative for color change, pallor and rash  Allergic/Immunologic: Negative for environmental allergies, food allergies and immunocompromised state  Neurological: Negative for dizziness, seizures, syncope and light-headedness  Hematological: Negative for adenopathy  Does not bruise/bleed easily  Psychiatric/Behavioral: Negative for dysphoric mood  The patient is not nervous/anxious  Physical Examination:     Vitals:    07/22/20 1437   BP: 126/70   BP Location: Right arm   Patient Position: Sitting   Cuff Size: Standard   Pulse: 58   Temp: 98 8 °F (37 1 °C)   TempSrc: Temporal   SpO2: 98%   Weight: 56 7 kg (125 lb)   Height: 4' 11" (1 499 m)       Physical Exam   Constitutional: She appears healthy  No distress  Eyes: Pupils are equal, round, and reactive to light  Conjunctivae are normal    Neck: Normal range of motion  Neck supple  No JVD present  Cardiovascular: Normal rate and regular rhythm  Exam reveals no gallop and no friction rub  Murmur heard  Systolic murmur is present with a grade of 2/6 at the upper right sternal border  Pulmonary/Chest: Effort normal and breath sounds normal  She has no wheezes  She has no rales  Abdominal: Soft  She exhibits no distension  There is no tenderness  Musculoskeletal: She exhibits no edema  Neurological: She is alert and oriented to person, place, and time  Skin: Skin is warm and dry          Labs:     Lab Results   Component Value Date    WBC 4 08 (L) 07/07/2020    HGB 12 5 07/07/2020    HCT 39 4 07/07/2020    MCV 94 07/07/2020    RDW 14 5 07/07/2020     07/07/2020     BMP:  Lab Results   Component Value Date    SODIUM 131 (L) 07/07/2020    K 4 4 07/07/2020    CL 99 (L) 07/07/2020    CO2 27 07/07/2020    BUN 17 07/07/2020    CREATININE 0 91 07/07/2020    GLUF 87 07/07/2020    CALCIUM 8 6 07/07/2020    EGFR 58 07/07/2020     LFT:  Lab Results Component Value Date    AST 20 07/07/2020    ALT 17 07/07/2020    ALKPHOS 113 07/07/2020    TP 8 0 07/07/2020    ALB 3 5 07/07/2020      Lab Results   Component Value Date    [id number] 1 650 07/07/2020     No results found for: HGBA1C  Lipid Profile:   Lab Results   Component Value Date    CHOLESTEROL 134 07/07/2020    HDL 70 07/07/2020    LDLCALC 48 07/07/2020    TRIG 78 07/07/2020     Lab Results   Component Value Date    CHOLESTEROL 134 07/07/2020    CHOLESTEROL 130 11/15/2019     No results found for: CKTOTAL, CKMB, CKMBINDEX, TROPONINI  No results found for: NTBNP   Recent Results (from the past 672 hour(s))   Comprehensive metabolic panel    Collection Time: 07/07/20  7:02 AM   Result Value Ref Range    Sodium 131 (L) 136 - 145 mmol/L    Potassium 4 4 3 5 - 5 3 mmol/L    Chloride 99 (L) 100 - 108 mmol/L    CO2 27 21 - 32 mmol/L    ANION GAP 5 4 - 13 mmol/L    BUN 17 5 - 25 mg/dL    Creatinine 0 91 0 60 - 1 30 mg/dL    Glucose, Fasting 87 65 - 99 mg/dL    Calcium 8 6 8 3 - 10 1 mg/dL    AST 20 5 - 45 U/L    ALT 17 12 - 78 U/L    Alkaline Phosphatase 113 46 - 116 U/L    Total Protein 8 0 6 4 - 8 2 g/dL    Albumin 3 5 3 5 - 5 0 g/dL    Total Bilirubin 0 40 0 20 - 1 00 mg/dL    eGFR 58 ml/min/1 73sq m   CBC    Collection Time: 07/07/20  7:02 AM   Result Value Ref Range    WBC 4 08 (L) 4 31 - 10 16 Thousand/uL    RBC 4 19 3 81 - 5 12 Million/uL    Hemoglobin 12 5 11 5 - 15 4 g/dL    Hematocrit 39 4 34 8 - 46 1 %    MCV 94 82 - 98 fL    MCH 29 8 26 8 - 34 3 pg    MCHC 31 7 31 4 - 37 4 g/dL    RDW 14 5 11 6 - 15 1 %    Platelets 764 738 - 947 Thousands/uL    MPV 10 4 8 9 - 12 7 fL   Lipid Panel with Direct LDL reflex    Collection Time: 07/07/20  7:02 AM   Result Value Ref Range    Cholesterol 134 50 - 200 mg/dL    Triglycerides 78 <=150 mg/dL    HDL, Direct 70 >=40 mg/dL    LDL Calculated 48 0 - 100 mg/dL   TSH, 3rd generation    Collection Time: 07/07/20  7:02 AM   Result Value Ref Range    TSH 3RD GENERATON 1 650 0 358 - 3 740 uIU/mL   UA (URINE) with reflex to Scope    Collection Time: 07/07/20 11:28 AM   Result Value Ref Range    Color, UA Yellow     Clarity, UA Cloudy     Specific [place], UA 1 013 1 003 - 1 030    pH, UA 7 0 4 5, 5 0, 5 5, 6 0, 6 5, 7 0, 7 5, 8 0    Leukocytes, UA Negative Negative    Nitrite, UA Negative Negative    Protein, UA Negative Negative mg/dl    Glucose, UA Negative Negative mg/dl    Ketones, UA Negative Negative mg/dl    Urobilinogen, UA 0 2 0 2, 1 0 E U /dl E U /dl    Bilirubin, UA Negative Negative    Blood, UA Negative Negative       Imaging & Testing   I have personally reviewed pertinent reports  Cardiac Testing   No results found for this or any previous visit  EKG: Personally reviewed  Sinus bradycardia with first-degree AV block      Warden Miguel DO, [place]  [phone]  Please call with any questions

## 2020-07-27 DIAGNOSIS — I10 ESSENTIAL HYPERTENSION: ICD-10-CM

## 2020-07-28 ENCOUNTER — TELEPHONE (OUTPATIENT)
Dept: CARDIOLOGY CLINIC | Facility: CLINIC | Age: 85
End: 2020-07-28

## 2020-07-28 NOTE — TELEPHONE ENCOUNTER
Patient is going to have dental work end of Aug  And will need premeds prior to this   Please assist

## 2020-07-30 ENCOUNTER — HOSPITAL ENCOUNTER (OUTPATIENT)
Dept: NON INVASIVE DIAGNOSTICS | Facility: HOSPITAL | Age: 85
Discharge: HOME | End: 2020-07-30
Attending: INTERNAL MEDICINE
Payer: MEDICARE

## 2020-07-30 DIAGNOSIS — I35.9 AORTIC VALVE DISEASE: ICD-10-CM

## 2020-07-30 PROCEDURE — 93306 TTE W/DOPPLER COMPLETE: CPT

## 2020-07-31 PROCEDURE — 93306 TTE W/DOPPLER COMPLETE: CPT | Performed by: INTERNAL MEDICINE

## 2020-08-03 ENCOUNTER — TELEPHONE (OUTPATIENT)
Dept: CARDIOLOGY CLINIC | Facility: CLINIC | Age: 85
End: 2020-08-03

## 2020-08-03 DIAGNOSIS — I35.9 AORTIC VALVE DISEASE: Primary | ICD-10-CM

## 2020-08-03 RX ORDER — AMOXICILLIN 500 MG/1
2000 TABLET, FILM COATED ORAL ONCE
Qty: 4 TABLET | Refills: 0 | Status: SHIPPED | OUTPATIENT
Start: 2020-08-03 | End: 2020-08-03

## 2020-08-03 NOTE — TELEPHONE ENCOUNTER
----- Message from Yaya Gonzalez DO sent at 8/1/2020 10:03 AM EDT -----  Can you please let the patient know her valve looks good and her heart is strong  Will go over it with her and her daughter in detail during her next appointment     Thanks

## 2020-08-03 NOTE — TELEPHONE ENCOUNTER
I spoke with daughter, made aware of the results  Also - Mom is scheduled for dental, did you want to do rx for antibiotic prophylaxis?   -Christophe Rodriguez

## 2020-08-06 ENCOUNTER — OFFICE VISIT (OUTPATIENT)
Dept: FAMILY MEDICINE CLINIC | Facility: CLINIC | Age: 85
End: 2020-08-06
Payer: MEDICARE

## 2020-08-06 VITALS
SYSTOLIC BLOOD PRESSURE: 132 MMHG | HEIGHT: 59 IN | BODY MASS INDEX: 25 KG/M2 | TEMPERATURE: 96.2 F | WEIGHT: 124 LBS | DIASTOLIC BLOOD PRESSURE: 84 MMHG | RESPIRATION RATE: 18 BRPM | HEART RATE: 68 BPM

## 2020-08-06 DIAGNOSIS — E78.2 MIXED HYPERLIPIDEMIA: ICD-10-CM

## 2020-08-06 DIAGNOSIS — I10 ESSENTIAL HYPERTENSION: Primary | ICD-10-CM

## 2020-08-06 PROCEDURE — 1036F TOBACCO NON-USER: CPT | Performed by: FAMILY MEDICINE

## 2020-08-06 PROCEDURE — 3075F SYST BP GE 130 - 139MM HG: CPT | Performed by: FAMILY MEDICINE

## 2020-08-06 PROCEDURE — 3079F DIAST BP 80-89 MM HG: CPT | Performed by: FAMILY MEDICINE

## 2020-08-06 PROCEDURE — 3008F BODY MASS INDEX DOCD: CPT | Performed by: FAMILY MEDICINE

## 2020-08-06 PROCEDURE — 4040F PNEUMOC VAC/ADMIN/RCVD: CPT | Performed by: FAMILY MEDICINE

## 2020-08-06 PROCEDURE — 99214 OFFICE O/P EST MOD 30 MIN: CPT | Performed by: FAMILY MEDICINE

## 2020-08-06 PROCEDURE — 1160F RVW MEDS BY RX/DR IN RCRD: CPT | Performed by: FAMILY MEDICINE

## 2020-08-06 RX ORDER — LOSARTAN POTASSIUM 25 MG/1
25 TABLET ORAL DAILY
Qty: 90 TABLET | Refills: 1 | Status: SHIPPED | OUTPATIENT
Start: 2020-08-06 | End: 2020-10-06

## 2020-08-06 NOTE — PROGRESS NOTES
Assessment/Plan:    1  Essential hypertension  Assessment & Plan:  Pt took pt off the norvasc    Orders:  -     Comprehensive metabolic panel; Future; Expected date: 10/20/2020  -     CBC; Future; Expected date: 10/20/2020  -     metoprolol tartrate (LOPRESSOR) 25 mg tablet; Take 0 5 tablets (12 5 mg total) by mouth 2 (two) times a day  -     losartan (COZAAR) 25 mg tablet; Take 1 tablet (25 mg total) by mouth daily    2  Mixed hyperlipidemia  Assessment & Plan:  Pt states cardio took her off the lipitor    Orders:  -     Lipid Panel with Direct LDL reflex; Future; Expected date: 10/20/2020  -     CBC; Future; Expected date: 10/20/2020    3  BMI 25 0-25 9,adult  -     [facility]; Future; Expected date: 10/20/2020          Patient Instructions     Weight Management   AMBULATORY CARE:   Why it is important to manage your weight:  Being overweight increases your risk of health conditions such as heart disease, high blood pressure, type 2 diabetes, and certain types of cancer  It can also increase your risk for osteoarthritis, sleep apnea, and other respiratory problems  Aim for a slow, steady weight loss  Even a small amount of weight loss can lower your risk of health problems  How to lose weight safely:  A safe and healthy way to lose weight is to eat fewer calories and get regular exercise  You can lose up about 1 pound a week by decreasing the number of calories you eat by 500 calories each day  You can decrease calories by eating smaller portion sizes or by cutting out high-calorie foods  Read labels to find out how many calories are in the foods you eat  You can also burn calories with exercise such as walking, swimming, or biking  You will be more likely to keep weight off if you make these changes part of your lifestyle  Healthy meal plan for weight management:  A healthy meal plan includes a variety of foods, contains fewer calories, and helps you stay healthy   A healthy meal plan includes the following:  · Eat whole-grain foods more often  A healthy meal plan should contain fiber  Fiber is the part of grains, fruits, and vegetables that is not broken down by your body  Whole-grain foods are healthy and provide extra fiber in your diet  Some examples of whole-grain foods are whole-wheat breads and pastas, oatmeal, brown rice, and bulgur  · Eat a variety of vegetables every day  Include dark, leafy greens such as spinach, kale, teo greens, and mustard greens  Eat yellow and orange vegetables such as carrots, sweet potatoes, and winter squash  · Eat a variety of fruits every day  Choose fresh or canned fruit (canned in its own juice or light syrup) instead of juice  Fruit juice has very little or no fiber  · Eat low-fat dairy foods  Drink fat-free (skim) milk or 1% milk  Eat fat-free yogurt and low-fat cottage cheese  Try low-fat cheeses such as mozzarella and other reduced-fat cheeses  · Choose meat and other protein foods that are low in fat  Choose beans or other legumes such as split peas or lentils  Choose fish, skinless poultry (chicken or turkey), or lean cuts of red meat (beef or pork)  Before you cook meat or poultry, cut off any visible fat  · Use less fat and oil  Try baking foods instead of frying them  Add less fat, such as margarine, sour cream, regular salad dressing and mayonnaise to foods  Eat fewer high-fat foods  Some examples of high-fat foods include french fries, doughnuts, ice cream, and cakes  · Eat fewer sweets  Limit foods and drinks that are high in sugar  This includes candy, cookies, regular soda, and sweetened drinks  Ways to decrease calories:   · Eat smaller portions  ¨ Use a small plate with smaller servings  ¨ Do not eat second helpings  ¨ When you eat at a restaurant, ask for a box and place half of your meal in the box before you eat  ¨ Share an entrée with someone else  · Replace high-calorie snacks with healthy, low-calorie snacks  ¨ Choose fresh fruit, vegetables, fat-free rice cakes, or air-popped popcorn instead of potato chips, nuts, or chocolate  ¨ Choose water or calorie-free drinks instead of soda or sweetened drinks  · Eat regular meals  Skipping meals can lead to overeating later in the day  Eat a healthy snack in place of a meal if you do not have time to eat a regular meal      · Do not shop for groceries when you are hungry  You may be more likely to make unhealthy food choices  Take a grocery list of healthy foods and shop after you have eaten  Exercise:  Exercise at least 30 minutes per day on most days of the week  Some examples of exercise include walking, biking, dancing, and swimming  You can also fit in more physical activity by taking the stairs instead of the elevator or parking farther away from stores  Ask your healthcare provider about the best exercise plan for you  Other things to consider as you try to lose weight:   · Be aware of situations that may give you the urge to overeat, such as eating while watching television  Find ways to avoid these situations  For example, read a book, go for a walk, or do crafts  · Meet with a weight loss support group or friends who are also trying to lose weight  This may help you stay motivated to continue working on your weight loss goals  © 2017 [employer] Information is for End User's use only and may not be sold, redistributed or otherwise used for commercial purposes  All illustrations and images included in CareNotes® are the copyrighted property of A D A M , Inc  or Marcelo Ardon  The above information is an  only  It is not intended as medical advice for individual conditions or treatments  Talk to your doctor, nurse or pharmacist before following any medical regimen to see if it is safe and effective for you  Return in about 3 months (around 11/6/2020) for Recheck      Subjective:      Patient ID: Sheila Sheppardjames Maldonado Solorzano is a 80 y o  female  Chief Complaint   Patient presents with    Hypertension     [facility] lpn       Pt is here for a 3 month follow up  No CP, no SOB    Pt had an echo done last week - cardio did call her and tell her it all looked good      The following portions of the patient's history were reviewed and updated as appropriate: allergies, current medications, past family history, past medical history, past social history, past surgical history and problem list     Review of Systems   Constitutional: Negative  Negative for activity change, appetite change, chills, diaphoresis and fatigue  HENT: Negative  Negative for dental problem, ear pain, sinus pressure and sore throat  Eyes: Negative  Negative for photophobia, pain, discharge, redness, itching and visual disturbance  Respiratory: Negative for apnea and chest tightness  Cardiovascular: Negative  Negative for chest pain, palpitations and leg swelling  Gastrointestinal: Negative  Negative for abdominal distention, abdominal pain, constipation and diarrhea  Endocrine: Negative  Negative for cold intolerance and heat intolerance  Genitourinary: Negative  Negative for difficulty urinating and dyspareunia  Musculoskeletal: Negative  Negative for arthralgias and back pain  Skin: Negative  Allergic/Immunologic: Negative for environmental allergies  Neurological: Negative  Negative for dizziness  Psychiatric/Behavioral: Negative  Negative for agitation           Current Outpatient Medications   Medication Sig Dispense Refill    aspirin (ECOTRIN LOW STRENGTH) 81 mg EC tablet Take 1 tablet (81 mg total) by mouth daily      famotidine (PEPCID) 20 mg tablet Take 20 mg by mouth daily       losartan (COZAAR) 25 mg tablet Take 1 tablet (25 mg total) by mouth daily 90 tablet 1    LUMIGAN 0 01 % ophthalmic drops       metoprolol tartrate (LOPRESSOR) 25 mg tablet Take 0 5 tablets (12 5 mg total) by mouth 2 (two) times a day 90 tablet 1    timolol (TIMOPTIC-XE) 0 5 % ophthalmic gel-forming        No current facility-administered medications for this visit  Objective:    /84   Pulse 68   Temp (!) 96 2 °F (35 7 °C)   Resp 18   Ht 4' 11" (1 499 m)   Wt 56 2 kg (124 lb)   BMI 25 04 kg/m²        Physical Exam   Constitutional: She appears well-developed  No distress  HENT:   Head: Normocephalic and atraumatic  Right Ear: External ear normal    Left Ear: External ear normal    Nose: Nose normal    Mouth/Throat: No oropharyngeal exudate  Eyes: Pupils are equal, round, and reactive to light  Right eye exhibits no discharge  Left eye exhibits no discharge  No scleral icterus  Neck: No thyromegaly present  Cardiovascular: Normal rate and normal heart sounds  No murmur heard  Pulmonary/Chest: Effort normal and breath sounds normal  No respiratory distress  She has no wheezes  Abdominal: Soft  Bowel sounds are normal  She exhibits no distension and no mass  There is no abdominal tenderness  There is no rebound and no guarding  Musculoskeletal: Normal range of motion  Neurological: She is alert  She displays normal reflexes  Coordination normal    Skin: Skin is warm and dry  No rash noted  She is not diaphoretic  No erythema  Psychiatric: Her behavior is normal    Nursing note and vitals reviewed             Recent Results (from the past 2016 hour(s))   Comprehensive metabolic panel    Collection Time: 07/07/20  7:02 AM   Result Value Ref Range    Sodium 131 (L) 136 - 145 mmol/L    Potassium 4 4 3 5 - 5 3 mmol/L    Chloride 99 (L) 100 - 108 mmol/L    CO2 27 21 - 32 mmol/L    ANION GAP 5 4 - 13 mmol/L    BUN 17 5 - 25 mg/dL    Creatinine 0 91 0 60 - 1 30 mg/dL    Glucose, Fasting 87 65 - 99 mg/dL    Calcium 8 6 8 3 - 10 1 mg/dL    AST 20 5 - 45 U/L    ALT 17 12 - 78 U/L    Alkaline Phosphatase 113 46 - 116 U/L    Total Protein 8 0 6 4 - 8 2 g/dL    Albumin 3 5 3 5 - 5 0 g/dL    Total Bilirubin 0 40 0 20 - 1 00 mg/dL    eGFR 58 ml/min/1 73sq m   CBC    Collection Time: 20  7:02 AM   Result Value Ref Range    WBC 4 08 (L) 4 31 - 10 16 Thousand/uL    RBC 4 19 3 81 - 5 12 Million/uL    Hemoglobin 12 5 11 5 - 15 4 g/dL    Hematocrit 39 4 34 8 - 46 1 %    MCV 94 82 - 98 fL    MCH 29 8 26 8 - 34 3 pg    MCHC 31 7 31 4 - 37 4 g/dL    RDW 14 5 11 6 - 15 1 %    Platelets 302 520 - 883 Thousands/uL    MPV 10 4 8 9 - 12 7 fL   Lipid Panel with Direct LDL reflex    Collection Time: 20  7:02 AM   Result Value Ref Range    Cholesterol 134 50 - 200 mg/dL    Triglycerides 78 <=150 mg/dL    HDL, Direct 70 >=40 mg/dL    LDL Calculated 48 0 - 100 mg/dL   TSH, 3rd generation    Collection Time: 20  7:02 AM   Result Value Ref Range    TSH 3RD GENERATON 1 650 0 358 - 3 740 uIU/mL   UA (URINE) with reflex to Scope    Collection Time: 20 11:28 AM   Result Value Ref Range    Color, UA Yellow     Clarity, UA Cloudy     Specific [place], UA 1 013 1 003 - 1 030    pH, UA 7 0 4 5, 5 0, 5 5, 6 0, 6 5, 7 0, 7 5, 8 0    Leukocytes, UA Negative Negative    Nitrite, UA Negative Negative    Protein, UA Negative Negative mg/dl    Glucose, UA Negative Negative mg/dl    Ketones, UA Negative Negative mg/dl    Urobilinogen, UA 0 2 0 2, 1 0 E U /dl E U /dl    Bilirubin, UA Negative Negative    Blood, UA Negative Negative     Echo complete with contrast if indicated   Status: Final result    PACS Images      Show images for Echo complete with contrast if indicated    Study Result     [address] [facility] [address]  [phone]     Transthoracic Echocardiogram  2D, M-mode, Doppler, and Color Doppler     Study date:  2020     Patient: Lam Tripp  MR number: [id number]  Account number: [de-identified]  : 1935  Age: 80 years  Gender: Female  Status: Outpatient  Location: Echo lab  Height: 59 in  Weight: 124 7 lb  BP: 205/ 77 mmHg     Indications:  Aortic valve replacement      Diagnoses: I35 9 - Nonrheumatic aortic valve disorder, unspecified     Sonographer:  VITA Deshpande  Primary Physician:  Christopher Boucher DO  Referring Physician:  Morenita Otto DO  Group:  [id number]  Interpreting Physician:  Bela Farias MD     SUMMARY     LEFT VENTRICLE:  Systolic function was normal  Ejection fraction was estimated in the range of 55 % to 60 % to be 60 %  There were no regional wall motion abnormalities  Features were consistent with a pseudonormal left ventricular filling pattern, with concomitant abnormal relaxation and increased filling pressure (grade 2 diastolic dysfunction)      LEFT ATRIUM:  The atrium was markedly dilated      MITRAL VALVE:  There was mild annular calcification  There was mild regurgitation      AORTIC VALVE:  A bioprosthesis was present  It exhibited normal function and normal motion  Valve mean gradient was 9 mmHg      TRICUSPID VALVE:  There was mild regurgitation      IVC, HEPATIC VEINS:  The inferior vena cava was mildly dilated      HISTORY: PRIOR HISTORY: Edema,Aortic valve replaced,sinus luis angel-tachy-syndrome, HTN,first degree Heart Block, sick sinus syndrome      PROCEDURE: The procedure was performed in the echo lab  This was a routine study  The transthoracic approach was used  The study included complete 2D imaging, M-mode, complete spectral Doppler, and color Doppler  The heart rate was 53 bpm,  at the start of the study  Images were obtained from the parasternal, apical, subcostal, and suprasternal notch acoustic windows  Image quality was adequate      LEFT VENTRICLE: Size was normal  Systolic function was normal  Ejection fraction was estimated in the range of 55 % to 60 % to be 60 %  There were no regional wall motion abnormalities  Wall thickness was normal  No evidence of apical  thrombus   DOPPLER: Features were consistent with a pseudonormal left ventricular filling pattern, with concomitant abnormal relaxation and increased filling pressure (grade 2 diastolic dysfunction)      RIGHT VENTRICLE: The size was normal  Systolic function was normal with TAPSE-1 9cm Wall thickness was normal      LEFT ATRIUM: The atrium was markedly dilated      RIGHT ATRIUM: Size was normal      MITRAL VALVE: There was mild annular calcification  There was normal leaflet separation  DOPPLER: The transmitral velocity was within the normal range  There was no evidence for stenosis  There was mild regurgitation      AORTIC VALVE: A bioprosthesis was present  It exhibited normal function and normal motion  DOPPLER: Transaortic velocity was minimally increased  There was no evidence for stenosis  There was no significant regurgitation      TRICUSPID VALVE: The valve structure was normal  There was normal leaflet separation  DOPPLER: The transtricuspid velocity was within the normal range  There was no evidence for stenosis  There was mild regurgitation  Estimated peak PA  pressure was 69 mmHg  The findings suggest moderate to severe pulmonary hypertension      PULMONIC VALVE: Leaflets exhibited normal thickness, no calcification, and normal cuspal separation  DOPPLER: The transpulmonic velocity was within the normal range  There was no significant regurgitation      PERICARDIUM: There was no pericardial effusion   The pericardium was normal in appearance      AORTA: The root exhibited normal size      SYSTEMIC VEINS: IVC: The inferior vena cava was mildly dilated      MEASUREMENT TABLES     DOPPLER MEASUREMENTS  Aortic valve   (Reference normals)  Peak hima   220 cm/s   (--)  Peak gradient   19 mmHg   (--)  Mean gradient   9 mmHg   (--)  Valve area   1 9 cmï¾²   (--)     SYSTEM MEASUREMENT TABLES     2D mode  AoR Diam 2D: 3 cm  LA Diam (2D): 3 7 cm  LA/Ao (2D): 1 23  FS (2D Teich): 28 8 %  IVSd (2D): 1 1 cm  LVDEV: 75 5 cmï¾³  LVESV: 33 3 cmï¾³  LVIDd(2D): 4 13 cm  LVISd (2D): 2 94 cm  LVOT Area 2D: 2 84 cmï¾²  LVPWd (2D): 1 09 cm  SV (Wendie Mackenzie): 42 2 cmï¾³     Apical four chamber  LVEF A4C: 56 %     Unspecified Scan Mode  DAYA Cont Eq (Peak Avery): 1 69 cmï¾²  DAYA Cont Eq (VTI): 1 96 cmï¾²  LVOT (VTI): 30 8 cm  LVOT Diam : 1 9 cm  LVOT Vmax: 1320 mm/s  LVOT Vmax; Mean: 1320 mm/s  Peak Grad ; Mean: 7 mm[Hg]  SV (LVOT): 87 cmï¾³  VTI;Mean: 4 mm[Hg]  DAYA Cont Eq (VTI): 2 05 cmï¾²  MV Peak A Avery: 785 mm/s  MV Peak E Avery  Mean: 997 mm/s  MVA (PHT): 4 78 cmï¾²  PHT: 46 ms  Max P mm[Hg]  V Max: 3420 mm/s  Vmax: 3440 mm/s  RA Area: 14 6 cmï¾²  RA Volume: 36 4 cmï¾³  TAPSE: 1 9 cm     [facility] Accredited Echocardiography Laboratory     Prepared and electronically signed by  Randall Jaquez MD  Signed 2020 12:37:26            Georgi Ramirez DO  BMI Counseling: Body mass index is 25 04 kg/m²  The BMI is above normal  Nutrition recommendations include reducing portion sizes

## 2020-08-06 NOTE — PATIENT INSTRUCTIONS

## 2020-08-25 ENCOUNTER — OFFICE VISIT (OUTPATIENT)
Dept: AUDIOLOGY | Facility: CLINIC | Age: 85
End: 2020-08-25

## 2020-08-25 DIAGNOSIS — H90.5 SENSORY HEARING LOSS: Primary | ICD-10-CM

## 2020-08-31 ENCOUNTER — OFFICE VISIT (OUTPATIENT)
Dept: CARDIOLOGY CLINIC | Facility: CLINIC | Age: 85
End: 2020-08-31
Payer: MEDICARE

## 2020-08-31 VITALS
DIASTOLIC BLOOD PRESSURE: 78 MMHG | WEIGHT: 124 LBS | OXYGEN SATURATION: 98 % | HEIGHT: 59 IN | HEART RATE: 68 BPM | BODY MASS INDEX: 25 KG/M2 | TEMPERATURE: 98.4 F | SYSTOLIC BLOOD PRESSURE: 142 MMHG

## 2020-08-31 DIAGNOSIS — I10 ESSENTIAL HYPERTENSION: ICD-10-CM

## 2020-08-31 DIAGNOSIS — E78.2 MIXED HYPERLIPIDEMIA: ICD-10-CM

## 2020-08-31 DIAGNOSIS — I70.209 PERIPHERAL ARTERIOSCLEROSIS (HCC): ICD-10-CM

## 2020-08-31 DIAGNOSIS — I44.0 HEART BLOCK, FIRST DEGREE: ICD-10-CM

## 2020-08-31 DIAGNOSIS — Z95.2 S/P AORTIC VALVE REPLACEMENT: ICD-10-CM

## 2020-08-31 DIAGNOSIS — I35.9 AORTIC VALVE DISEASE: Primary | ICD-10-CM

## 2020-08-31 DIAGNOSIS — R60.0 BILATERAL LEG EDEMA: ICD-10-CM

## 2020-08-31 DIAGNOSIS — I49.5 SINUS BRADY-TACHY SYNDROME (HCC): ICD-10-CM

## 2020-08-31 PROCEDURE — 3077F SYST BP >= 140 MM HG: CPT | Performed by: INTERNAL MEDICINE

## 2020-08-31 PROCEDURE — 3008F BODY MASS INDEX DOCD: CPT | Performed by: INTERNAL MEDICINE

## 2020-08-31 PROCEDURE — 1160F RVW MEDS BY RX/DR IN RCRD: CPT | Performed by: INTERNAL MEDICINE

## 2020-08-31 PROCEDURE — 1036F TOBACCO NON-USER: CPT | Performed by: INTERNAL MEDICINE

## 2020-08-31 PROCEDURE — 3078F DIAST BP <80 MM HG: CPT | Performed by: INTERNAL MEDICINE

## 2020-08-31 PROCEDURE — 4040F PNEUMOC VAC/ADMIN/RCVD: CPT | Performed by: INTERNAL MEDICINE

## 2020-08-31 PROCEDURE — 99214 OFFICE O/P EST MOD 30 MIN: CPT | Performed by: INTERNAL MEDICINE

## 2020-08-31 RX ORDER — RIBOFLAVIN (VITAMIN B2) 100 MG
100 TABLET ORAL DAILY
COMMUNITY
End: 2022-04-04

## 2020-08-31 RX ORDER — METOPROLOL SUCCINATE 25 MG/1
25 TABLET, EXTENDED RELEASE ORAL DAILY
Qty: 30 TABLET | Refills: 5 | Status: SHIPPED | OUTPATIENT
Start: 2020-08-31 | End: 2021-04-12

## 2020-08-31 NOTE — PROGRESS NOTES
Cardiology   Waldemar Conn DO, Dima Brooks MD, Mj Tellez MD, Robina Schmitt MD, [facility]  -------------------------------------------------------------------  Formerly [facility]  [address], [facility], [address] [place] [address], [facility]  [phone]    Cardiology Follow Up  Rohith Cruz  1935  [id number]          Assessment/Plan:    1  Aortic valve disease with prior AVR  - recent echocardiogram showed normal valve function  Normal valve gradients without any regurgitation  2  Essential hypertension  - blood pressure is elevated after discontinuing amlodipine  - will switch metoprolol to 25 mg daily of metoprolol succinate  3  Heart block, first degree  - patient's daughter will monitor patient's heart rate with change in metoprolol  4  Sinus luis angel-tachy syndrome (HCC)  - heart rate normal today  Metoprolol changed to 25 mg daily from 12 5 mg b i d  5  Peripheral arteriosclerosis ([address] )  - continue aspirin    6  Bilateral leg edema  - resolved with discontinuation of amlodipine  7  Mixed hyperlipidemia  - last LDL cholesterol was less than 70 mg/dL  Statin was discontinued  Interval History:     Rohith Cruz is 80 y o  female here for followup of hypertension  She was last seen on July 22, 2020  At that time, amlodipine was discontinued because of lower extremity edema and 2D echocardiogram was ordered  In addition, atorvastatin was discontinued  Since that visit, lower extremity edema has resolved  She denies any chest pain, shortness of breath, orthopnea or paroxysmal nocturnal dyspnea  The patient has a history of aortic valve replacement  In June 2015, she had aortic valve [facility] for aortic valve stenosis  She has no history of coronary artery disease  She previously was on metoprolol 25 mg b i d  But was reduced to 12 5 mg b i d  Because of bradycardia        Past Medical History:   Diagnosis Date    Aortic valve disease     Heart block, first degree     Hyperlipidemia     Hypertension     Sinus luis angel-tachy syndrome (HCC)     SSS (sick sinus syndrome) (HCC)      Social History     Socioeconomic History    Marital status: /Civil Union     Spouse name: Not on file    Number of children: Not on file    Years of education: Not on file    Highest education level: Not on file   Occupational History    Not on file   Social Needs    Financial resource strain: Not on file    Food insecurity     Worry: Not on file     Inability: Not on file   [employer] needs     Medical: Not on file     Non-medical: Not on file   Tobacco Use    Smoking status: Never Smoker    Smokeless tobacco: Never Used   Substance and Sexual Activity    Alcohol use: Not Currently    Drug use: Not on file    Sexual activity: Not on file   Lifestyle    Physical activity     Days per week: Not on file     Minutes per session: Not on file    Stress: Not on file   Relationships    Social connections     Talks on phone: Not on file     Gets together: Not on file     Attends Hinduism service: Not on file     Active member of club or organization: Not on file     Attends meetings of clubs or organizations: Not on file     Relationship status: Not on file    Intimate partner violence     Fear of current or ex partner: Not on file     Emotionally abused: Not on file     Physically abused: Not on file     Forced sexual activity: Not on file   Other Topics Concern    Not on file   Social History Narrative    Not on file      Family History   Problem Relation Age of Onset    Heart disease Mother     Hypertension Mother     Diabetes Mother     Heart disease Father     Heart attack Father          at 54    Mental illness Neg Hx      Past Surgical History:   Procedure Laterality Date    AORTIC VALVE REPLACEMENT  2015    Porcine valve       Current Outpatient Medications:    Ascorbic Acid (vitamin C) 100 MG tablet, Take 100 mg by mouth daily, Disp: , Rfl:     aspirin (ECOTRIN LOW STRENGTH) 81 mg EC tablet, Take 1 tablet (81 mg total) by mouth daily, Disp: , Rfl:     losartan (COZAAR) 25 mg tablet, Take 1 tablet (25 mg total) by mouth daily, Disp: 90 tablet, Rfl: 1    LUMIGAN 0 01 % ophthalmic drops, , Disp: , Rfl:     metoprolol tartrate (LOPRESSOR) 25 mg tablet, Take 0 5 tablets (12 5 mg total) by mouth 2 (two) times a day, Disp: 90 tablet, Rfl: 1    timolol (TIMOPTIC-XE) 0 5 % ophthalmic gel-forming, , Disp: , Rfl:     famotidine (PEPCID) 20 mg tablet, Take 20 mg by mouth daily , Disp: , Rfl:         Review of Systems:  Review of Systems   Constitutional: Negative for chills, fatigue and fever  HENT: Negative for congestion, nosebleeds and postnasal drip  Respiratory: Negative for cough, chest tightness and shortness of breath  Cardiovascular: Negative for chest pain, palpitations and leg swelling  Gastrointestinal: Negative for abdominal distention, abdominal pain, diarrhea, nausea and vomiting  Endocrine: Negative for polydipsia, polyphagia and polyuria  Musculoskeletal: Negative for gait problem and myalgias  Skin: Negative for color change, pallor and rash  Allergic/Immunologic: Negative for environmental allergies, food allergies and immunocompromised state  Neurological: Negative for dizziness, seizures, syncope and light-headedness  Hematological: Negative for adenopathy  Does not bruise/bleed easily  Psychiatric/Behavioral: Negative for dysphoric mood  The patient is not nervous/anxious  Physical Exam:  Vitals:  Vitals:    08/31/20 0835   BP: 142/78   BP Location: Left arm   Patient Position: Sitting   Cuff Size: Standard   Pulse: 68   Temp: 98 4 °F (36 9 °C)   TempSrc: Temporal   SpO2: 98%   Weight: 56 2 kg (124 lb)   Height: 4' 11" (1 499 m)     Physical Exam   Constitutional: She appears healthy  No distress     Eyes: Pupils are equal, round, and reactive to light  Conjunctivae are normal    Neck: Normal range of motion  Neck supple  No JVD present  Cardiovascular: Normal rate and regular rhythm  Exam reveals no gallop and no friction rub  Murmur heard  Pulmonary/Chest: Effort normal and breath sounds normal  She has no wheezes  She has no rales  Abdominal: Soft  She exhibits no distension  There is no abdominal tenderness  Musculoskeletal:         General: No edema  Neurological: She is alert and oriented to person, place, and time  Skin: Skin is warm and dry  Cardiographics:  Last known EF:  55-60% with normal bioprosthetic aortic valve  This note was completed in part utilizing [employer] Software  Grammatical errors, random word insertions, spelling mistakes, and incomplete sentences can be an occasional consequence of this system secondary to software limitations, ambient noise, and hardware issues  If you have any questions or concerns about the content, text, or information contained within the body of this dictation, please contact the provider for clarification

## 2020-09-08 ENCOUNTER — OFFICE VISIT (OUTPATIENT)
Dept: PODIATRY | Facility: CLINIC | Age: 85
End: 2020-09-08
Payer: MEDICARE

## 2020-09-08 VITALS
WEIGHT: 124 LBS | BODY MASS INDEX: 25 KG/M2 | SYSTOLIC BLOOD PRESSURE: 132 MMHG | HEIGHT: 59 IN | RESPIRATION RATE: 17 BRPM | HEART RATE: 70 BPM | DIASTOLIC BLOOD PRESSURE: 78 MMHG

## 2020-09-08 DIAGNOSIS — M79.672 PAIN IN BOTH FEET: ICD-10-CM

## 2020-09-08 DIAGNOSIS — I70.209 PERIPHERAL ARTERIOSCLEROSIS (HCC): Primary | ICD-10-CM

## 2020-09-08 DIAGNOSIS — L84 CORNS: ICD-10-CM

## 2020-09-08 DIAGNOSIS — M79.671 PAIN IN BOTH FEET: ICD-10-CM

## 2020-09-08 PROCEDURE — 11056 PARNG/CUTG B9 HYPRKR LES 2-4: CPT | Performed by: PODIATRIST

## 2020-09-08 NOTE — PROGRESS NOTES
Assessment/Plan:  Pain   Metatarsalgia   Peripheral artery disease   Bunion formation   Callus formation  6 plantar lesions noted   Mycotic toenail   Clawtoe     Plan   Foot exam performed   Patient educated on conditions   All mycotic nails debrided   Plantar lesions debrided as well       Subjective:  Patient complains of pain in her feet   Patient has pain with ambulation   No history of trauma   She has pain with shoe wear  No past medical history on file      No past surgical history on file              Allergies   Allergen Reactions    Cephalexin              Current Outpatient Prescriptions:     metoprolol tartrate (LOPRESSOR) 50 mg tablet, Take 50 mg by mouth 2 (two) times a day, Disp: , Rfl:     amLODIPine (NORVASC) 5 mg tablet, Take 5 mg by mouth daily, Disp: , Rfl:     aspirin 325 mg tablet, Take 325 mg by mouth daily, Disp: , Rfl:     atorvastatin (LIPITOR) 10 mg tablet, Take 10 mg by mouth daily, Disp: , Rfl:     losartan (COZAAR) 50 mg tablet, , Disp: , Rfl:     LUMIGAN 0 01 % ophthalmic drops, , Disp: , Rfl:      There is no problem list on file for this patient             Patient ID: Rea Huizar is a 80 y  o  female      HPI     The following portions of the patient's history were reviewed and updated as appropriate: allergies, current medications, past family history, past medical history, past social history, past surgical history and problem list      Review of Systems       Objective:  Patient's shoes and socks removed    Foot Exam     General  General Appearance: appears stated age and healthy   Orientation: alert and oriented to person, place, and time   Affect: appropriate   Gait: antalgic         Right Foot/Ankle      Inspection and Palpation  Ecchymosis: none  Tenderness: metatarsals   Swelling: metatarsals   Arch: pes planus  Hammertoes: fifth toe   234 toes of the right foot are clawed    Hallux valgus: yes  Hallux limitus: yes  Skin Exam: callus;      Neurovascular  Dorsalis pedis: 1+  Posterior tibial: 1+  Superficial peroneal nerve sensation: diminished  Deep peroneal nerve sensation: diminished  Sural nerve sensation: diminished  Achilles reflex: 2+     Muscle Strength  Ankle dorsiflexion: 4+        Left Foot/Ankle       Inspection and Palpation  Tenderness: metatarsals   Swelling: metatarsals   Arch: pes planus  Hammertoes: fifth toe   Toes 234 are clawed a period  Hallux valgus: yes  Hallux limitus: yes     Neurovascular  Dorsalis pedis: 1+  Superficial peroneal nerve sensation: diminished  Deep peroneal nerve sensation: diminished  Sural nerve sensation: diminished  Achilles reflex: 2+     Muscle Strength  Ankle dorsiflexion: 4+        Physical Exam   Constitutional: She appears well-developed and well-nourished  Cardiovascular: Normal rate and regular rhythm     Pulses:       Dorsalis pedis pulses are 1+ on the right side, and 1+ on the left side         Posterior tibial pulses are 1+ on the right side  Q 9 findings noted bilateral   Negative digital hair noted   Positive abnormal cooling    Feet:   Right Foot:   Skin Integrity: Positive for callus  Neurological:   Reflex Scores:       Achilles reflexes are 2+ on the right side and 2+ on the left side  Skin:   Tylenol submetatarsal 2 noted   This is bilateral     Severe thickened mycotic toenail noted bilateral   Nails are yellow with debris   Positive malodor noted      Distal clavi by 2nd 3rd and 4th toes of the left foot    Nursing note and vitals reviewed

## 2020-10-06 ENCOUNTER — OFFICE VISIT (OUTPATIENT)
Dept: CARDIOLOGY CLINIC | Facility: CLINIC | Age: 85
End: 2020-10-06
Payer: MEDICARE

## 2020-10-06 VITALS
SYSTOLIC BLOOD PRESSURE: 180 MMHG | OXYGEN SATURATION: 98 % | HEIGHT: 59 IN | HEART RATE: 56 BPM | TEMPERATURE: 98.9 F | DIASTOLIC BLOOD PRESSURE: 80 MMHG | WEIGHT: 123 LBS | BODY MASS INDEX: 24.8 KG/M2

## 2020-10-06 DIAGNOSIS — I49.5 SINUS BRADY-TACHY SYNDROME (HCC): ICD-10-CM

## 2020-10-06 DIAGNOSIS — Z95.2 S/P AORTIC VALVE REPLACEMENT: ICD-10-CM

## 2020-10-06 DIAGNOSIS — I44.0 HEART BLOCK, FIRST DEGREE: ICD-10-CM

## 2020-10-06 DIAGNOSIS — I10 ESSENTIAL HYPERTENSION: Primary | ICD-10-CM

## 2020-10-06 DIAGNOSIS — I35.9 AORTIC VALVE DISEASE: ICD-10-CM

## 2020-10-06 PROCEDURE — 99214 OFFICE O/P EST MOD 30 MIN: CPT | Performed by: INTERNAL MEDICINE

## 2020-10-06 RX ORDER — LOSARTAN POTASSIUM 50 MG/1
50 TABLET ORAL DAILY
Qty: 30 TABLET | Refills: 2 | Status: SHIPPED | OUTPATIENT
Start: 2020-10-06 | End: 2020-12-31

## 2020-10-27 ENCOUNTER — LAB (OUTPATIENT)
Dept: LAB | Facility: CLINIC | Age: 85
End: 2020-10-27
Payer: MEDICARE

## 2020-10-27 DIAGNOSIS — I10 ESSENTIAL HYPERTENSION: ICD-10-CM

## 2020-10-27 DIAGNOSIS — E78.2 MIXED HYPERLIPIDEMIA: ICD-10-CM

## 2020-10-27 LAB
ALBUMIN SERPL BCP-MCNC: 3.2 G/DL (ref 3.5–5)
ALP SERPL-CCNC: 101 U/L (ref 46–116)
ALT SERPL W P-5'-P-CCNC: 15 U/L (ref 12–78)
ANION GAP SERPL CALCULATED.3IONS-SCNC: 2 MMOL/L (ref 4–13)
AST SERPL W P-5'-P-CCNC: 12 U/L (ref 5–45)
BILIRUB SERPL-MCNC: 0.39 MG/DL (ref 0.2–1)
BUN SERPL-MCNC: 18 MG/DL (ref 5–25)
CALCIUM ALBUM COR SERPL-MCNC: 8.9 MG/DL (ref 8.3–10.1)
CALCIUM SERPL-MCNC: 8.3 MG/DL (ref 8.3–10.1)
CHLORIDE SERPL-SCNC: 107 MMOL/L (ref 100–108)
CHOLEST SERPL-MCNC: 169 MG/DL (ref 50–200)
CO2 SERPL-SCNC: 31 MMOL/L (ref 21–32)
CREAT SERPL-MCNC: 0.91 MG/DL (ref 0.6–1.3)
ERYTHROCYTE [DISTWIDTH] IN BLOOD BY AUTOMATED COUNT: 14.9 % (ref 11.6–15.1)
GFR SERPL CREATININE-BSD FRML MDRD: 58 ML/MIN/1.73SQ M
GLUCOSE P FAST SERPL-MCNC: 89 MG/DL (ref 65–99)
HCT VFR BLD AUTO: 38.7 % (ref 34.8–46.1)
HDLC SERPL-MCNC: 67 MG/DL
HGB BLD-MCNC: 12 G/DL (ref 11.5–15.4)
LDLC SERPL CALC-MCNC: 88 MG/DL (ref 0–100)
MCH RBC QN AUTO: 29.6 PG (ref 26.8–34.3)
MCHC RBC AUTO-ENTMCNC: 31 G/DL (ref 31.4–37.4)
MCV RBC AUTO: 96 FL (ref 82–98)
PLATELET # BLD AUTO: 169 THOUSANDS/UL (ref 149–390)
PMV BLD AUTO: 10.7 FL (ref 8.9–12.7)
POTASSIUM SERPL-SCNC: 3.9 MMOL/L (ref 3.5–5.3)
PROT SERPL-MCNC: 7.4 G/DL (ref 6.4–8.2)
RBC # BLD AUTO: 4.05 MILLION/UL (ref 3.81–5.12)
SODIUM SERPL-SCNC: 140 MMOL/L (ref 136–145)
TRIGL SERPL-MCNC: 69 MG/DL
WBC # BLD AUTO: 4.01 THOUSAND/UL (ref 4.31–10.16)

## 2020-10-27 PROCEDURE — 85027 COMPLETE CBC AUTOMATED: CPT

## 2020-10-27 PROCEDURE — 80061 LIPID PANEL: CPT

## 2020-10-27 PROCEDURE — 36415 COLL VENOUS BLD VENIPUNCTURE: CPT

## 2020-10-27 PROCEDURE — 80053 COMPREHEN METABOLIC PANEL: CPT

## 2020-11-03 ENCOUNTER — OFFICE VISIT (OUTPATIENT)
Dept: CARDIOLOGY CLINIC | Facility: CLINIC | Age: 85
End: 2020-11-03
Payer: MEDICARE

## 2020-11-03 VITALS
WEIGHT: 123 LBS | DIASTOLIC BLOOD PRESSURE: 88 MMHG | HEART RATE: 59 BPM | TEMPERATURE: 98.2 F | BODY MASS INDEX: 24.8 KG/M2 | OXYGEN SATURATION: 97 % | HEIGHT: 59 IN | SYSTOLIC BLOOD PRESSURE: 128 MMHG

## 2020-11-03 DIAGNOSIS — E78.2 MIXED HYPERLIPIDEMIA: ICD-10-CM

## 2020-11-03 DIAGNOSIS — I49.5 SINUS BRADY-TACHY SYNDROME (HCC): ICD-10-CM

## 2020-11-03 DIAGNOSIS — Z95.2 S/P AORTIC VALVE REPLACEMENT: ICD-10-CM

## 2020-11-03 DIAGNOSIS — I70.209 PERIPHERAL ARTERIOSCLEROSIS (HCC): ICD-10-CM

## 2020-11-03 DIAGNOSIS — I44.0 HEART BLOCK, FIRST DEGREE: ICD-10-CM

## 2020-11-03 DIAGNOSIS — I35.9 AORTIC VALVE DISEASE: ICD-10-CM

## 2020-11-03 DIAGNOSIS — R60.0 BILATERAL LEG EDEMA: ICD-10-CM

## 2020-11-03 DIAGNOSIS — I10 ESSENTIAL HYPERTENSION: Primary | ICD-10-CM

## 2020-11-03 PROCEDURE — 93000 ELECTROCARDIOGRAM COMPLETE: CPT | Performed by: INTERNAL MEDICINE

## 2020-11-03 PROCEDURE — 99214 OFFICE O/P EST MOD 30 MIN: CPT | Performed by: INTERNAL MEDICINE

## 2020-11-03 RX ORDER — HYDROCHLOROTHIAZIDE 25 MG/1
25 TABLET ORAL DAILY
Qty: 30 TABLET | Refills: 5 | Status: SHIPPED | OUTPATIENT
Start: 2020-11-03 | End: 2021-03-30

## 2020-11-05 ENCOUNTER — OFFICE VISIT (OUTPATIENT)
Dept: FAMILY MEDICINE CLINIC | Facility: CLINIC | Age: 85
End: 2020-11-05
Payer: MEDICARE

## 2020-11-05 VITALS
SYSTOLIC BLOOD PRESSURE: 186 MMHG | RESPIRATION RATE: 16 BRPM | HEART RATE: 84 BPM | WEIGHT: 125 LBS | BODY MASS INDEX: 25.2 KG/M2 | TEMPERATURE: 97.6 F | HEIGHT: 59 IN | DIASTOLIC BLOOD PRESSURE: 86 MMHG

## 2020-11-05 DIAGNOSIS — E78.2 MIXED HYPERLIPIDEMIA: ICD-10-CM

## 2020-11-05 DIAGNOSIS — R60.0 BILATERAL LEG EDEMA: ICD-10-CM

## 2020-11-05 DIAGNOSIS — Z23 NEED FOR VACCINATION: ICD-10-CM

## 2020-11-05 DIAGNOSIS — I49.5 SINUS BRADY-TACHY SYNDROME (HCC): ICD-10-CM

## 2020-11-05 DIAGNOSIS — Z00.00 MEDICARE ANNUAL WELLNESS VISIT, SUBSEQUENT: Primary | ICD-10-CM

## 2020-11-05 DIAGNOSIS — I10 ESSENTIAL HYPERTENSION: ICD-10-CM

## 2020-11-05 DIAGNOSIS — I70.209 PERIPHERAL ARTERIOSCLEROSIS (HCC): ICD-10-CM

## 2020-11-05 PROCEDURE — G0439 PPPS, SUBSEQ VISIT: HCPCS | Performed by: FAMILY MEDICINE

## 2020-11-05 PROCEDURE — G0008 ADMIN INFLUENZA VIRUS VAC: HCPCS

## 2020-11-05 PROCEDURE — 90662 IIV NO PRSV INCREASED AG IM: CPT

## 2020-11-10 ENCOUNTER — OFFICE VISIT (OUTPATIENT)
Dept: PODIATRY | Facility: CLINIC | Age: 85
End: 2020-11-10
Payer: MEDICARE

## 2020-11-10 VITALS
WEIGHT: 125 LBS | BODY MASS INDEX: 25.2 KG/M2 | SYSTOLIC BLOOD PRESSURE: 133 MMHG | HEART RATE: 87 BPM | HEIGHT: 59 IN | DIASTOLIC BLOOD PRESSURE: 76 MMHG | RESPIRATION RATE: 17 BRPM

## 2020-11-10 DIAGNOSIS — I70.209 PERIPHERAL ARTERIOSCLEROSIS (HCC): Primary | ICD-10-CM

## 2020-11-10 DIAGNOSIS — M79.672 PAIN IN BOTH FEET: ICD-10-CM

## 2020-11-10 DIAGNOSIS — M79.671 PAIN IN BOTH FEET: ICD-10-CM

## 2020-11-10 DIAGNOSIS — L84 CORNS: ICD-10-CM

## 2020-11-10 PROCEDURE — 11056 PARNG/CUTG B9 HYPRKR LES 2-4: CPT | Performed by: PODIATRIST

## 2020-12-08 ENCOUNTER — HOSPITAL ENCOUNTER (INPATIENT)
Facility: HOSPITAL | Age: 85
LOS: 1 days | Discharge: HOME | DRG: 605 | End: 2020-12-09
Attending: SURGERY | Admitting: SURGERY
Payer: MEDICARE

## 2020-12-08 ENCOUNTER — HOSPITAL ENCOUNTER (EMERGENCY)
Facility: HOSPITAL | Age: 85
End: 2020-12-08
Attending: EMERGENCY MEDICINE
Payer: MEDICARE

## 2020-12-08 ENCOUNTER — APPOINTMENT (EMERGENCY)
Dept: RADIOLOGY | Facility: HOSPITAL | Age: 85
End: 2020-12-08
Payer: MEDICARE

## 2020-12-08 VITALS
HEART RATE: 61 BPM | WEIGHT: 127.6 LBS | RESPIRATION RATE: 20 BRPM | TEMPERATURE: 97.4 F | BODY MASS INDEX: 25.77 KG/M2 | DIASTOLIC BLOOD PRESSURE: 108 MMHG | OXYGEN SATURATION: 99 % | SYSTOLIC BLOOD PRESSURE: 237 MMHG

## 2020-12-08 DIAGNOSIS — S03.00XA CLOSED DISLOCATION OF JAW, INITIAL ENCOUNTER: ICD-10-CM

## 2020-12-08 DIAGNOSIS — I62.9 INTRACRANIAL BLEED (HCC): Primary | ICD-10-CM

## 2020-12-08 DIAGNOSIS — S09.90XA INJURY OF HEAD, INITIAL ENCOUNTER: ICD-10-CM
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
## 2020-12-08 DIAGNOSIS — Q04.6 COLLOID CYST OF BRAIN (HCC): ICD-10-CM

## 2020-12-08 DIAGNOSIS — I35.9 AORTIC VALVE DISEASE: ICD-10-CM

## 2020-12-08 DIAGNOSIS — R93.0 ABNORMAL HEAD CT: ICD-10-CM

## 2020-12-08 LAB
ALBUMIN SERPL BCP-MCNC: 3.3 G/DL (ref 3.5–5)
ALP SERPL-CCNC: 106 U/L (ref 46–116)
ALT SERPL W P-5'-P-CCNC: 15 U/L (ref 12–78)
ANION GAP SERPL CALCULATED.3IONS-SCNC: 7 MMOL/L (ref 4–13)
APTT PPP: 32 SECONDS (ref 23–37)
AST SERPL W P-5'-P-CCNC: 14 U/L (ref 5–45)
BASOPHILS # BLD AUTO: 0.05 THOUSANDS/ΜL (ref 0–0.1)
BASOPHILS NFR BLD AUTO: 1 % (ref 0–1)
BILIRUB SERPL-MCNC: 0.39 MG/DL (ref 0.2–1)
BUN SERPL-MCNC: 27 MG/DL (ref 5–25)
CALCIUM ALBUM COR SERPL-MCNC: 10 MG/DL (ref 8.3–10.1)
CALCIUM SERPL-MCNC: 9.4 MG/DL (ref 8.3–10.1)
CHLORIDE SERPL-SCNC: 97 MMOL/L (ref 100–108)
CO2 SERPL-SCNC: 27 MMOL/L (ref 21–32)
CREAT SERPL-MCNC: 0.81 MG/DL (ref 0.6–1.3)
EOSINOPHIL # BLD AUTO: 0.2 THOUSAND/ΜL (ref 0–0.61)
EOSINOPHIL NFR BLD AUTO: 3 % (ref 0–6)
ERYTHROCYTE [DISTWIDTH] IN BLOOD BY AUTOMATED COUNT: 14.1 % (ref 11.6–15.1)
GFR SERPL CREATININE-BSD FRML MDRD: 66 ML/MIN/1.73SQ M
GLUCOSE SERPL-MCNC: 111 MG/DL (ref 65–140)
HCT VFR BLD AUTO: 35.7 % (ref 34.8–46.1)
HGB BLD-MCNC: 11.7 G/DL (ref 11.5–15.4)
IMM GRANULOCYTES # BLD AUTO: 0.02 THOUSAND/UL (ref 0–0.2)
IMM GRANULOCYTES NFR BLD AUTO: 0 % (ref 0–2)
INR PPP: 1.05 (ref 0.84–1.19)
LYMPHOCYTES # BLD AUTO: 1.56 THOUSANDS/ΜL (ref 0.6–4.47)
LYMPHOCYTES NFR BLD AUTO: 24 % (ref 14–44)
MCH RBC QN AUTO: 29.8 PG (ref 26.8–34.3)
MCHC RBC AUTO-ENTMCNC: 32.8 G/DL (ref 31.4–37.4)
MCV RBC AUTO: 91 FL (ref 82–98)
MONOCYTES # BLD AUTO: 0.65 THOUSAND/ΜL (ref 0.17–1.22)
MONOCYTES NFR BLD AUTO: 10 % (ref 4–12)
NEUTROPHILS # BLD AUTO: 3.98 THOUSANDS/ΜL (ref 1.85–7.62)
NEUTS SEG NFR BLD AUTO: 62 % (ref 43–75)
NRBC BLD AUTO-RTO: 0 /100 WBCS
PLATELET # BLD AUTO: 174 THOUSANDS/UL (ref 149–390)
PMV BLD AUTO: 9.5 FL (ref 8.9–12.7)
POTASSIUM SERPL-SCNC: 3.4 MMOL/L (ref 3.5–5.3)
PROT SERPL-MCNC: 7.7 G/DL (ref 6.4–8.2)
PROTHROMBIN TIME: 13.7 SECONDS (ref 11.6–14.5)
RBC # BLD AUTO: 3.93 MILLION/UL (ref 3.81–5.12)
SODIUM SERPL-SCNC: 131 MMOL/L (ref 136–145)
WBC # BLD AUTO: 6.46 THOUSAND/UL (ref 4.31–10.16)

## 2020-12-08 PROCEDURE — 73564 X-RAY EXAM KNEE 4 OR MORE: CPT

## 2020-12-08 PROCEDURE — 85025 COMPLETE CBC W/AUTO DIFF WBC: CPT | Performed by: EMERGENCY MEDICINE

## 2020-12-08 PROCEDURE — 85730 THROMBOPLASTIN TIME PARTIAL: CPT | Performed by: EMERGENCY MEDICINE

## 2020-12-08 PROCEDURE — 99285 EMERGENCY DEPT VISIT HI MDM: CPT

## 2020-12-08 PROCEDURE — 36415 COLL VENOUS BLD VENIPUNCTURE: CPT | Performed by: EMERGENCY MEDICINE

## 2020-12-08 PROCEDURE — 99223 1ST HOSP IP/OBS HIGH 75: CPT | Performed by: SURGERY

## 2020-12-08 PROCEDURE — 90715 TDAP VACCINE 7 YRS/> IM: CPT | Performed by: EMERGENCY MEDICINE

## 2020-12-08 PROCEDURE — 85610 PROTHROMBIN TIME: CPT | Performed by: EMERGENCY MEDICINE

## 2020-12-08 PROCEDURE — 72125 CT NECK SPINE W/O DYE: CPT

## 2020-12-08 PROCEDURE — NC001 PR NO CHARGE: Performed by: NEUROLOGICAL SURGERY

## 2020-12-08 PROCEDURE — 12013 RPR F/E/E/N/L/M 2.6-5.0 CM: CPT | Performed by: EMERGENCY MEDICINE

## 2020-12-08 PROCEDURE — 70450 CT HEAD/BRAIN W/O DYE: CPT

## 2020-12-08 PROCEDURE — 99284 EMERGENCY DEPT VISIT MOD MDM: CPT | Performed by: EMERGENCY MEDICINE

## 2020-12-08 PROCEDURE — G1004 CDSM NDSC: HCPCS

## 2020-12-08 PROCEDURE — 70486 CT MAXILLOFACIAL W/O DYE: CPT

## 2020-12-08 PROCEDURE — 90471 IMMUNIZATION ADMIN: CPT

## 2020-12-08 PROCEDURE — 80053 COMPREHEN METABOLIC PANEL: CPT | Performed by: EMERGENCY MEDICINE

## 2020-12-08 PROCEDURE — 99284 EMERGENCY DEPT VISIT MOD MDM: CPT

## 2020-12-08 PROCEDURE — 96374 THER/PROPH/DIAG INJ IV PUSH: CPT

## 2020-12-08 RX ORDER — ACETAMINOPHEN 325 MG/1
975 TABLET ORAL EVERY 8 HOURS SCHEDULED
Status: DISCONTINUED | OUTPATIENT
Start: 2020-12-08 | End: 2020-12-09 | Stop reason: HOSPADM

## 2020-12-08 RX ORDER — LABETALOL 20 MG/4 ML (5 MG/ML) INTRAVENOUS SYRINGE
10 ONCE
Status: COMPLETED | OUTPATIENT
Start: 2020-12-08 | End: 2020-12-08

## 2020-12-08 RX ORDER — METOPROLOL SUCCINATE 25 MG/1
25 TABLET, EXTENDED RELEASE ORAL DAILY
Status: DISCONTINUED | OUTPATIENT
Start: 2020-12-09 | End: 2020-12-09 | Stop reason: HOSPADM

## 2020-12-08 RX ORDER — LABETALOL 20 MG/4 ML (5 MG/ML) INTRAVENOUS SYRINGE
5 EVERY 4 HOURS PRN
Status: DISCONTINUED | OUTPATIENT
Start: 2020-12-08 | End: 2020-12-09 | Stop reason: HOSPADM

## 2020-12-08 RX ORDER — OXYCODONE HYDROCHLORIDE 5 MG/1
2.5 TABLET ORAL EVERY 4 HOURS PRN
Status: DISCONTINUED | OUTPATIENT
Start: 2020-12-08 | End: 2020-12-09 | Stop reason: HOSPADM

## 2020-12-08 RX ORDER — GINSENG 100 MG
1 CAPSULE ORAL ONCE
Status: COMPLETED | OUTPATIENT
Start: 2020-12-08 | End: 2020-12-08

## 2020-12-08 RX ORDER — LOSARTAN POTASSIUM 50 MG/1
50 TABLET ORAL DAILY
Status: DISCONTINUED | OUTPATIENT
Start: 2020-12-09 | End: 2020-12-09 | Stop reason: HOSPADM

## 2020-12-08 RX ORDER — HYDROCHLOROTHIAZIDE 25 MG/1
25 TABLET ORAL DAILY
Status: DISCONTINUED | OUTPATIENT
Start: 2020-12-09 | End: 2020-12-09 | Stop reason: HOSPADM

## 2020-12-08 RX ORDER — ONDANSETRON 2 MG/ML
4 INJECTION INTRAMUSCULAR; INTRAVENOUS EVERY 4 HOURS PRN
Status: DISCONTINUED | OUTPATIENT
Start: 2020-12-08 | End: 2020-12-09 | Stop reason: HOSPADM

## 2020-12-08 RX ORDER — FAMOTIDINE 20 MG/1
20 TABLET, FILM COATED ORAL DAILY
Status: DISCONTINUED | OUTPATIENT
Start: 2020-12-09 | End: 2020-12-09 | Stop reason: HOSPADM

## 2020-12-08 RX ORDER — LIDOCAINE HYDROCHLORIDE 10 MG/ML
5 INJECTION, SOLUTION EPIDURAL; INFILTRATION; INTRACAUDAL; PERINEURAL ONCE
Status: COMPLETED | OUTPATIENT
Start: 2020-12-08 | End: 2020-12-08

## 2020-12-08 RX ORDER — OXYCODONE HYDROCHLORIDE 5 MG/1
5 TABLET ORAL EVERY 4 HOURS PRN
Status: DISCONTINUED | OUTPATIENT
Start: 2020-12-08 | End: 2020-12-09 | Stop reason: HOSPADM

## 2020-12-08 RX ORDER — HYDROMORPHONE HCL/PF 1 MG/ML
0.2 SYRINGE (ML) INJECTION EVERY 4 HOURS PRN
Status: DISCONTINUED | OUTPATIENT
Start: 2020-12-08 | End: 2020-12-09 | Stop reason: HOSPADM

## 2020-12-08 RX ORDER — AMOXICILLIN 250 MG
1 CAPSULE ORAL 2 TIMES DAILY
Status: DISCONTINUED | OUTPATIENT
Start: 2020-12-08 | End: 2020-12-09 | Stop reason: HOSPADM

## 2020-12-08 RX ADMIN — BACITRACIN 1 SMALL APPLICATION: 500 OINTMENT TOPICAL at 17:15

## 2020-12-08 RX ADMIN — LABETALOL 20 MG/4 ML (5 MG/ML) INTRAVENOUS SYRINGE 10 MG: at 17:11

## 2020-12-08 RX ADMIN — LIDOCAINE HYDROCHLORIDE 5 ML: 10 INJECTION, SOLUTION EPIDURAL; INFILTRATION; INTRACAUDAL at 16:24

## 2020-12-08 RX ADMIN — ACETAMINOPHEN 975 MG: 325 TABLET, FILM COATED ORAL at 19:30

## 2020-12-08 RX ADMIN — LABETALOL 20 MG/4 ML (5 MG/ML) INTRAVENOUS SYRINGE 10 MG: at 17:34

## 2020-12-08 RX ADMIN — TETANUS TOXOID, REDUCED DIPHTHERIA TOXOID AND ACELLULAR PERTUSSIS VACCINE, ADSORBED 0.5 ML: 5; 2.5; 8; 8; 2.5 SUSPENSION INTRAMUSCULAR at 16:55

## 2020-12-09 ENCOUNTER — APPOINTMENT (INPATIENT)
Dept: RADIOLOGY | Facility: HOSPITAL | Age: 85
DRG: 605 | End: 2020-12-09
Payer: MEDICARE

## 2020-12-09 ENCOUNTER — TELEPHONE (OUTPATIENT)
Dept: CARDIOLOGY CLINIC | Facility: CLINIC | Age: 85
End: 2020-12-09

## 2020-12-09 VITALS
HEART RATE: 56 BPM | DIASTOLIC BLOOD PRESSURE: 66 MMHG | TEMPERATURE: 98.1 F | BODY MASS INDEX: 25.6 KG/M2 | SYSTOLIC BLOOD PRESSURE: 134 MMHG | HEIGHT: 59 IN | RESPIRATION RATE: 16 BRPM | WEIGHT: 127 LBS | OXYGEN SATURATION: 94 %
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
## 2020-12-09 PROBLEM — Q04.6 COLLOID CYST OF BRAIN (HCC): Status: ACTIVE | Noted: 2020-12-09

## 2020-12-09 LAB
ANION GAP SERPL CALCULATED.3IONS-SCNC: 6 MMOL/L (ref 4–13)
BUN SERPL-MCNC: 22 MG/DL (ref 5–25)
CALCIUM SERPL-MCNC: 9 MG/DL (ref 8.3–10.1)
CHLORIDE SERPL-SCNC: 101 MMOL/L (ref 100–108)
CO2 SERPL-SCNC: 28 MMOL/L (ref 21–32)
CREAT SERPL-MCNC: 0.9 MG/DL (ref 0.6–1.3)
GFR SERPL CREATININE-BSD FRML MDRD: 58 ML/MIN/1.73SQ M
GLUCOSE SERPL-MCNC: 142 MG/DL (ref 65–140)
POTASSIUM SERPL-SCNC: 3.2 MMOL/L (ref 3.5–5.3)
SODIUM SERPL-SCNC: 135 MMOL/L (ref 136–145)

## 2020-12-09 PROCEDURE — 80048 BASIC METABOLIC PNL TOTAL CA: CPT | Performed by: NURSE PRACTITIONER

## 2020-12-09 PROCEDURE — 97166 OT EVAL MOD COMPLEX 45 MIN: CPT

## 2020-12-09 PROCEDURE — 99222 1ST HOSP IP/OBS MODERATE 55: CPT | Performed by: NEUROLOGICAL SURGERY

## 2020-12-09 PROCEDURE — 70450 CT HEAD/BRAIN W/O DYE: CPT

## 2020-12-09 PROCEDURE — NC001 PR NO CHARGE: Performed by: EMERGENCY MEDICINE

## 2020-12-09 PROCEDURE — G1004 CDSM NDSC: HCPCS

## 2020-12-09 PROCEDURE — 99238 HOSP IP/OBS DSCHRG MGMT 30/<: CPT | Performed by: EMERGENCY MEDICINE

## 2020-12-09 PROCEDURE — 97163 PT EVAL HIGH COMPLEX 45 MIN: CPT

## 2020-12-09 RX ORDER — ASPIRIN 81 MG/1
81 TABLET ORAL DAILY
Refills: 0
Start: 2020-12-13

## 2020-12-09 RX ORDER — OXYCODONE HYDROCHLORIDE 5 MG/1
2.5 TABLET ORAL EVERY 4 HOURS PRN
Qty: 15 TABLET | Refills: 0 | Status: SHIPPED | OUTPATIENT
Start: 2020-12-09 | End: 2020-12-19

## 2020-12-09 RX ORDER — ACETAMINOPHEN 325 MG/1
975 TABLET ORAL EVERY 8 HOURS SCHEDULED
Qty: 30 TABLET | Refills: 0 | Status: SHIPPED | OUTPATIENT
Start: 2020-12-09

## 2020-12-09 RX ORDER — POTASSIUM CHLORIDE 20 MEQ/1
40 TABLET, EXTENDED RELEASE ORAL ONCE
Status: COMPLETED | OUTPATIENT
Start: 2020-12-09 | End: 2020-12-09

## 2020-12-09 RX ADMIN — ACETAMINOPHEN 975 MG: 325 TABLET, FILM COATED ORAL at 05:04

## 2020-12-09 RX ADMIN — FAMOTIDINE 20 MG: 20 TABLET ORAL at 08:13

## 2020-12-09 RX ADMIN — LOSARTAN POTASSIUM 50 MG: 50 TABLET, FILM COATED ORAL at 08:13

## 2020-12-09 RX ADMIN — METOPROLOL SUCCINATE 25 MG: 25 TABLET, EXTENDED RELEASE ORAL at 08:13

## 2020-12-09 RX ADMIN — HYDROCHLOROTHIAZIDE 25 MG: 25 TABLET ORAL at 08:13

## 2020-12-09 RX ADMIN — POTASSIUM CHLORIDE 40 MEQ: 1500 TABLET, EXTENDED RELEASE ORAL at 16:24

## 2020-12-09 RX ADMIN — DOCUSATE SODIUM AND SENNOSIDES 1 TABLET: 8.6; 5 TABLET ORAL at 08:13

## 2020-12-10 ENCOUNTER — TRANSITIONAL CARE MANAGEMENT (OUTPATIENT)
Dept: FAMILY MEDICINE CLINIC | Facility: CLINIC | Age: 85
End: 2020-12-10

## 2020-12-18 ENCOUNTER — OFFICE VISIT (OUTPATIENT)
Dept: FAMILY MEDICINE CLINIC | Facility: CLINIC | Age: 85
End: 2020-12-18
Payer: MEDICARE

## 2020-12-18 VITALS
HEIGHT: 59 IN | WEIGHT: 126 LBS | SYSTOLIC BLOOD PRESSURE: 140 MMHG | DIASTOLIC BLOOD PRESSURE: 82 MMHG | OXYGEN SATURATION: 99 % | RESPIRATION RATE: 18 BRPM | BODY MASS INDEX: 25.4 KG/M2 | TEMPERATURE: 96.8 F | HEART RATE: 51 BPM

## 2020-12-18 DIAGNOSIS — S09.90XA TRAUMATIC INJURY OF HEAD, INITIAL ENCOUNTER: ICD-10-CM

## 2020-12-18 DIAGNOSIS — S01.81XA LACERATION OF FOREHEAD, INITIAL ENCOUNTER: ICD-10-CM
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
## 2020-12-18 PROCEDURE — 99495 TRANSJ CARE MGMT MOD F2F 14D: CPT | Performed by: FAMILY MEDICINE

## 2020-12-27 DIAGNOSIS — I10 ESSENTIAL HYPERTENSION: ICD-10-CM

## 2020-12-31 RX ORDER — LOSARTAN POTASSIUM 50 MG/1
TABLET ORAL
Qty: 30 TABLET | Refills: 2 | Status: SHIPPED | OUTPATIENT
Start: 2020-12-31 | End: 2021-03-10

## 2021-01-06 ENCOUNTER — OFFICE VISIT (OUTPATIENT)
Dept: CARDIOLOGY CLINIC | Facility: CLINIC | Age: 86
End: 2021-01-06
Payer: MEDICARE

## 2021-01-06 VITALS
OXYGEN SATURATION: 99 % | SYSTOLIC BLOOD PRESSURE: 172 MMHG | TEMPERATURE: 97.7 F | WEIGHT: 126 LBS | HEIGHT: 59 IN | HEART RATE: 53 BPM | BODY MASS INDEX: 25.4 KG/M2 | DIASTOLIC BLOOD PRESSURE: 92 MMHG

## 2021-01-06 DIAGNOSIS — I10 ESSENTIAL HYPERTENSION: ICD-10-CM

## 2021-01-06 DIAGNOSIS — E78.2 MIXED HYPERLIPIDEMIA: ICD-10-CM

## 2021-01-06 DIAGNOSIS — I49.5 SINUS BRADY-TACHY SYNDROME (HCC): ICD-10-CM

## 2021-01-06 DIAGNOSIS — Z95.2 S/P AORTIC VALVE REPLACEMENT: ICD-10-CM

## 2021-01-06 DIAGNOSIS — I35.9 AORTIC VALVE DISEASE: Primary | ICD-10-CM

## 2021-01-06 DIAGNOSIS — I44.0 HEART BLOCK, FIRST DEGREE: ICD-10-CM

## 2021-01-06 PROCEDURE — 99214 OFFICE O/P EST MOD 30 MIN: CPT | Performed by: INTERNAL MEDICINE

## 2021-01-06 RX ORDER — LOSARTAN POTASSIUM 25 MG/1
25 TABLET ORAL DAILY
Qty: 90 TABLET | Refills: 3 | Status: SHIPPED | OUTPATIENT
Start: 2021-01-06 | End: 2021-03-10

## 2021-01-06 NOTE — PROGRESS NOTES
Cardiology   Marylin Barba DO, Main Juarez MD, Danny Coyne MD, Jewels Romero MD, [facility] [place]  -------------------------------------------------------------------  [facility]  [address], [address], [address] [place] [address], [address] [place] [address] Avenue  [phone]    Cardiology Follow Up  Jigna Holbrook  1935  [id number]          Assessment/Plan:    1  Aortic valve disease with prior AVR  - recent echocardiogram showed normal valve function  Normal valve gradients without any regurgitation  2  Essential hypertension  - blood pressure is elevated after discontinuing amlodipine  Home blood pressure log reviewed  Blood pressure remains elevated but improved with addition of hydrochlorothiazide 25 mg daily  Recent blood work showed normal renal function and electrolytes  - will increase losartan to 75 mg daily along with Toprol XL 25 mg daily     Will recheck blood work in 2-4 weeks  - continue home blood pressure monitoring  3  Heart block, first degree  - heart rate has remained stable between 55 and 60 beats per minute  4  Sinus luis angel-tachy syndrome (HCC)  - heart rate normal today  5  Peripheral arteriosclerosis ([address] )  - continue aspirin    6  Bilateral leg edema  - resolved with discontinuation of amlodipine  7  Mixed hyperlipidemia  - last LDL cholesterol was less than 70 mg/dL  Statin was discontinued  Interval History:     Jigna Holbrook is 80 y o  female here for followup of hypertension  She was last seen in November 2020, blood pressure was markedly elevated during that visit and hydrochlorothiazide 25 mg daily was added to her medication regimen  Previously, she developed lower extremity edema which improved with amlodipine discontinuation  Since then, blood pressure has been elevated  She was started on Toprol XL 25 mg daily along with losartan 50 mg daily  Home blood pressure log was reviewed    Blood pressure is improved but still remains elevated  Average systolic blood pressure is greater than 150 mm Hg  She denies any symptoms  She denies any lower extremity edema, orthopnea, paroxysmal nocturnal dyspnea, chest pain or palpitations  She fell last month and hit her head  Did not have any syncope, lightheadedness or palpitations  The patient has a history of aortic valve replacement  In June 2015, she had aortic valve replacement at [facility] for aortic valve stenosis  She has no history of coronary artery disease          Past Medical History:   Diagnosis Date    Aortic valve disease     Glaucoma     Heart block, first degree     Hyperlipidemia     Hypertension     Sinus luis angel-tachy syndrome (HCC)     SSS [facility]sick sinus syndrome) ([facility])      Social History     Socioeconomic History    Marital status: /Civil Union     Spouse name: Not on file    Number of children: Not on file    Years of education: Not on file    Highest education level: Not on file   Occupational History    Not on file   Social Needs    Financial resource strain: Not on file    Food insecurity     Worry: Not on file     Inability: Not on file   [employer] needs     Medical: Not on file     Non-medical: Not on file   Tobacco Use    Smoking status: Never Smoker    Smokeless tobacco: Never Used   Substance and Sexual Activity    Alcohol use: Not Currently     Frequency: Never     Binge frequency: Never    Drug use: Never    Sexual activity: Not on file   Lifestyle    Physical activity     Days per week: Not on file     Minutes per session: Not on file    Stress: Not on file   Relationships    Social connections     Talks on phone: Not on file     Gets together: Not on file     Attends Confucianism service: Not on file     Active member of club or organization: Not on file     Attends meetings of clubs or organizations: Not on file     Relationship status: Not on file    Intimate partner violence     Fear of current or ex partner: Not on file     Emotionally abused: Not on file     Physically abused: Not on file     Forced sexual activity: Not on file   Other Topics Concern    Not on file   Social History Narrative    Not on file      Family History   Problem Relation Age of Onset    Heart disease Mother     Hypertension Mother     Diabetes Mother     Heart disease Father     Heart attack Father          at 54    Mental illness Neg Hx      Past Surgical History:   Procedure Laterality Date    AORTIC VALVE REPLACEMENT  2015    Porcine valve       Current Outpatient Medications:     Ascorbic Acid (vitamin C) 100 MG tablet, Take 100 mg by mouth daily, Disp: , Rfl:     aspirin (ECOTRIN LOW STRENGTH) 81 mg EC tablet, Take 1 tablet (81 mg total) by mouth daily, Disp:  , Rfl: 0    famotidine (PEPCID) 20 mg tablet, Take 20 mg by mouth daily , Disp: , Rfl:     hydrochlorothiazide (HYDRODIURIL) 25 mg tablet, Take 1 tablet (25 mg total) by mouth daily, Disp: 30 tablet, Rfl: 5    losartan (COZAAR) 50 mg tablet, TAKE ONE TABLET BY MOUTH EVERY DAY, Disp: 30 tablet, Rfl: 2    LUMIGAN 0 01 % ophthalmic drops, , Disp: , Rfl:     metoprolol succinate (TOPROL-XL) 25 mg 24 hr tablet, Take 1 tablet (25 mg total) by mouth daily, Disp: 30 tablet, Rfl: 5    timolol (TIMOPTIC-XE) 0 5 % ophthalmic gel-forming, , Disp: , Rfl:     acetaminophen (TYLENOL) 325 mg tablet, Take 3 tablets (975 mg total) by mouth every 8 (eight) hours (Patient not taking: Reported on 2021), Disp: 30 tablet, Rfl: 0        Review of Systems:  Review of Systems   Constitutional: Negative for chills and fever  HENT: Negative for ear pain and sore throat  Eyes: Negative for pain and visual disturbance  Respiratory: Negative for cough and shortness of breath  Cardiovascular: Negative for chest pain and palpitations  Gastrointestinal: Negative for abdominal pain and vomiting     Genitourinary: Negative for dysuria and hematuria  Musculoskeletal: Negative for arthralgias and back pain  Skin: Negative for color change and rash  Neurological: Negative for seizures and syncope  All other systems reviewed and are negative  Physical Exam:  Vitals:  Vitals:    01/06/21 0817   BP: (!) 172/92   BP Location: Left arm   Patient Position: Sitting   Cuff Size: Child   Pulse: (!) 53   Temp: 97 7 °F (36 5 °C)   TempSrc: Temporal   SpO2: 99%   Weight: 57 2 kg (126 lb)   Height: 4' 11" (1 499 m)     Physical Exam   Constitutional: She appears healthy  No distress  Eyes: Pupils are equal, round, and reactive to light  Conjunctivae are normal    Neck: Normal range of motion  Neck supple  No JVD present  Cardiovascular: Normal rate and regular rhythm  Exam reveals no gallop and no friction rub  Murmur heard  Pulmonary/Chest: Effort normal and breath sounds normal  She has no wheezes  She has no rales  Musculoskeletal:         General: No tenderness, deformity or edema  Neurological: She is alert and oriented to person, place, and time  Skin: Skin is warm and dry  Cardiographics:  Last known EF:  55-60% with normal bioprosthetic aortic valve  This note was completed in part utilizing [employer] Direct Software  Grammatical errors, random word insertions, spelling mistakes, and incomplete sentences can be an occasional consequence of this system secondary to software limitations, ambient noise, and hardware issues  If you have any questions or concerns about the content, text, or information contained within the body of this dictation, please contact the provider for clarification

## 2021-01-12 ENCOUNTER — OFFICE VISIT (OUTPATIENT)
Dept: PODIATRY | Facility: CLINIC | Age: 86
End: 2021-01-12
Payer: MEDICARE

## 2021-01-12 VITALS
HEIGHT: 59 IN | HEART RATE: 74 BPM | RESPIRATION RATE: 17 BRPM | SYSTOLIC BLOOD PRESSURE: 160 MMHG | WEIGHT: 126 LBS | DIASTOLIC BLOOD PRESSURE: 68 MMHG | BODY MASS INDEX: 25.4 KG/M2

## 2021-01-12 DIAGNOSIS — I70.209 PERIPHERAL ARTERIOSCLEROSIS (HCC): Primary | ICD-10-CM

## 2021-01-12 DIAGNOSIS — L84 CORNS: ICD-10-CM

## 2021-01-12 DIAGNOSIS — M79.671 PAIN IN BOTH FEET: ICD-10-CM

## 2021-01-12 DIAGNOSIS — M79.672 PAIN IN BOTH FEET: ICD-10-CM

## 2021-01-12 PROCEDURE — 11056 PARNG/CUTG B9 HYPRKR LES 2-4: CPT | Performed by: PODIATRIST

## 2021-01-12 NOTE — PROGRESS NOTES
Assessment/Plan:  Pain   Metatarsalgia   Peripheral artery disease   Bunion formation   Callus formation   6 plantar lesions noted   Mycotic toenail   Clawtoe     Plan   Foot exam performed   Patient educated on conditions   All mycotic nails debrided   Plantar lesions debrided as well       Subjective:  Patient complains of pain in her feet   Patient has pain with ambulation   No history of trauma   She has pain with shoe wear  No past medical history on file      No past surgical history on file              Allergies   Allergen Reactions    Cephalexin              Current Outpatient Prescriptions:     metoprolol tartrate (LOPRESSOR) 50 mg tablet, Take 50 mg by mouth 2 (two) times a day, Disp: , Rfl:     amLODIPine (NORVASC) 5 mg tablet, Take 5 mg by mouth daily, Disp: , Rfl:     aspirin 325 mg tablet, Take 325 mg by mouth daily, Disp: , Rfl:     atorvastatin (LIPITOR) 10 mg tablet, Take 10 mg by mouth daily, Disp: , Rfl:     losartan (COZAAR) 50 mg tablet, , Disp: , Rfl:     LUMIGAN 0 01 % ophthalmic drops, , Disp: , Rfl:      There is no problem list on file for this patient             Patient ID: Rea Huizar is a 85 y  o  female      HPI     The following portions of the patient's history were reviewed and updated as appropriate: allergies, current medications, past family history, past medical history, past social history, past surgical history and problem list      Review of Systems       Objective:  Patient's shoes and socks removed    Foot Exam     General  General Appearance: appears stated age and healthy   Orientation: alert and oriented to person, place, and time   Affect: appropriate   Gait: antalgic         Right Foot/Ankle      Inspection and Palpation  Ecchymosis: none  Tenderness: metatarsals   Swelling: metatarsals   Arch: pes planus  Hammertoes: fifth toe   234 toes of the right foot are clawed    Hallux valgus: yes  Hallux limitus: yes  Skin Exam: callus;      Neurovascular  Dorsalis pedis: 1+  Posterior tibial: 1+  Superficial peroneal nerve sensation: diminished  Deep peroneal nerve sensation: diminished  Sural nerve sensation: diminished  Achilles reflex: 2+     Muscle Strength  Ankle dorsiflexion: 4+        Left Foot/Ankle       Inspection and Palpation  Tenderness: metatarsals   Swelling: metatarsals   Arch: pes planus  Hammertoes: fifth toe   Toes 234 are clawed a period  Hallux valgus: yes  Hallux limitus: yes     Neurovascular  Dorsalis pedis: 1+  Superficial peroneal nerve sensation: diminished  Deep peroneal nerve sensation: diminished  Sural nerve sensation: diminished  Achilles reflex: 2+     Muscle Strength  Ankle dorsiflexion: 4+        Physical Exam   Constitutional: She appears well-developed and well-nourished  Cardiovascular: Normal rate and regular rhythm     Pulses:       Dorsalis pedis pulses are 1+ on the right side, and 1+ on the left side         Posterior tibial pulses are 1+ on the right side  Q 9 findings noted bilateral   Negative digital hair noted   Positive abnormal cooling    Feet:   Right Foot:   Skin Integrity: Positive for callus  Neurological:   Reflex Scores:       Achilles reflexes are 2+ on the right side and 2+ on the left side  Skin:   Tylenol submetatarsal 2 noted   This is bilateral     Severe thickened mycotic toenail noted bilateral   Nails are yellow with debris   Positive malodor noted      Distal clavi by 2nd 3rd and 4th toes of the left foot    Nursing note and vitals reviewed

## 2021-01-28 ENCOUNTER — LAB (OUTPATIENT)
Dept: LAB | Facility: CLINIC | Age: 86
End: 2021-01-28
Payer: MEDICARE

## 2021-01-28 DIAGNOSIS — I10 ESSENTIAL HYPERTENSION: ICD-10-CM

## 2021-01-28 LAB
ANION GAP SERPL CALCULATED.3IONS-SCNC: 3 MMOL/L (ref 4–13)
BUN SERPL-MCNC: 20 MG/DL (ref 5–25)
CALCIUM SERPL-MCNC: 9.3 MG/DL (ref 8.3–10.1)
CHLORIDE SERPL-SCNC: 95 MMOL/L (ref 100–108)
CO2 SERPL-SCNC: 32 MMOL/L (ref 21–32)
CREAT SERPL-MCNC: 0.86 MG/DL (ref 0.6–1.3)
GFR SERPL CREATININE-BSD FRML MDRD: 62 ML/MIN/1.73SQ M
GLUCOSE P FAST SERPL-MCNC: 89 MG/DL (ref 65–99)
POTASSIUM SERPL-SCNC: 4.1 MMOL/L (ref 3.5–5.3)
SODIUM SERPL-SCNC: 130 MMOL/L (ref 136–145)

## 2021-01-28 PROCEDURE — 80048 BASIC METABOLIC PNL TOTAL CA: CPT

## 2021-01-28 PROCEDURE — 36415 COLL VENOUS BLD VENIPUNCTURE: CPT

## 2021-02-01 DIAGNOSIS — I10 ESSENTIAL HYPERTENSION: Primary | ICD-10-CM

## 2021-02-02 ENCOUNTER — TELEPHONE (OUTPATIENT)
Dept: CARDIOLOGY CLINIC | Facility: CLINIC | Age: 86
End: 2021-02-02

## 2021-02-02 NOTE — TELEPHONE ENCOUNTER
----- Message from Don Martin DO sent at 2/1/2021 11:08 AM EST -----  Can you please let the patient know her labs looked okay, but her sodium level is a little lower than usual   If it drops any further, we will need to stop Hydrochlorothiazide    Repeat BMP in 2-4 weeks ordered

## 2021-02-02 NOTE — TELEPHONE ENCOUNTER
S/W pt's daughter who will have the labs repeated in 2-4 weeks and wait for a call back regarding those results and next steps

## 2021-02-12 DIAGNOSIS — Z23 ENCOUNTER FOR IMMUNIZATION: ICD-10-CM

## 2021-03-10 ENCOUNTER — OFFICE VISIT (OUTPATIENT)
Dept: CARDIOLOGY CLINIC | Facility: CLINIC | Age: 86
End: 2021-03-10
Payer: MEDICARE

## 2021-03-10 VITALS
TEMPERATURE: 97.7 F | SYSTOLIC BLOOD PRESSURE: 158 MMHG | DIASTOLIC BLOOD PRESSURE: 84 MMHG | OXYGEN SATURATION: 96 % | WEIGHT: 128 LBS | BODY MASS INDEX: 25.8 KG/M2 | HEART RATE: 59 BPM | HEIGHT: 59 IN

## 2021-03-10 DIAGNOSIS — I49.5 SINUS BRADY-TACHY SYNDROME (HCC): ICD-10-CM

## 2021-03-10 DIAGNOSIS — I35.9 AORTIC VALVE DISEASE: ICD-10-CM

## 2021-03-10 DIAGNOSIS — I44.0 HEART BLOCK, FIRST DEGREE: ICD-10-CM

## 2021-03-10 DIAGNOSIS — I10 ESSENTIAL HYPERTENSION: Primary | ICD-10-CM

## 2021-03-10 DIAGNOSIS — Z95.2 S/P AORTIC VALVE REPLACEMENT: ICD-10-CM

## 2021-03-10 DIAGNOSIS — E78.2 MIXED HYPERLIPIDEMIA: ICD-10-CM

## 2021-03-10 PROCEDURE — 93000 ELECTROCARDIOGRAM COMPLETE: CPT | Performed by: INTERNAL MEDICINE

## 2021-03-10 PROCEDURE — 99214 OFFICE O/P EST MOD 30 MIN: CPT | Performed by: INTERNAL MEDICINE

## 2021-03-10 RX ORDER — LOSARTAN POTASSIUM 100 MG/1
100 TABLET ORAL DAILY
Qty: 30 TABLET | Refills: 2 | COMMUNITY
End: 2021-06-14

## 2021-03-11 DIAGNOSIS — I10 ESSENTIAL HYPERTENSION: ICD-10-CM

## 2021-03-12 RX ORDER — LOSARTAN POTASSIUM 50 MG/1
TABLET ORAL
Qty: 30 TABLET | Refills: 2 | Status: SHIPPED | OUTPATIENT
Start: 2021-03-12 | End: 2021-03-30

## 2021-03-16 ENCOUNTER — TELEPHONE (OUTPATIENT)
Dept: CARDIOLOGY CLINIC | Facility: CLINIC | Age: 86
End: 2021-03-16

## 2021-03-16 ENCOUNTER — OFFICE VISIT (OUTPATIENT)
Dept: PODIATRY | Facility: CLINIC | Age: 86
End: 2021-03-16
Payer: MEDICARE

## 2021-03-16 VITALS
WEIGHT: 128 LBS | DIASTOLIC BLOOD PRESSURE: 85 MMHG | SYSTOLIC BLOOD PRESSURE: 138 MMHG | HEIGHT: 59 IN | BODY MASS INDEX: 25.8 KG/M2

## 2021-03-16 DIAGNOSIS — L84 CORNS: ICD-10-CM

## 2021-03-16 DIAGNOSIS — M79.672 PAIN IN BOTH FEET: ICD-10-CM

## 2021-03-16 DIAGNOSIS — M79.671 PAIN IN BOTH FEET: ICD-10-CM

## 2021-03-16 DIAGNOSIS — I70.209 PERIPHERAL ARTERIOSCLEROSIS (HCC): Primary | ICD-10-CM

## 2021-03-16 PROCEDURE — 11056 PARNG/CUTG B9 HYPRKR LES 2-4: CPT | Performed by: PODIATRIST

## 2021-03-16 RX ORDER — NICOTINE POLACRILEX 2 MG
GUM BUCCAL
COMMUNITY

## 2021-03-16 NOTE — TELEPHONE ENCOUNTER
S/W pt's dtr, Anibal Balbuena, who confirmed that she is taking 100mg Losartan and 25mg Toprol XL daily  She has not refilled the Toprol since January since, at one point, she was cut back to 0 5 tabs and had quite a bit leftover  She will call when she needs a refill of this med

## 2021-03-26 ENCOUNTER — APPOINTMENT (OUTPATIENT)
Dept: LAB | Facility: CLINIC | Age: 86
End: 2021-03-26
Payer: MEDICARE

## 2021-03-26 LAB
ANION GAP SERPL CALCULATED.3IONS-SCNC: 5 MMOL/L (ref 4–13)
BUN SERPL-MCNC: 24 MG/DL (ref 5–25)
CALCIUM SERPL-MCNC: 9 MG/DL (ref 8.3–10.1)
CHLORIDE SERPL-SCNC: 95 MMOL/L (ref 100–108)
CO2 SERPL-SCNC: 28 MMOL/L (ref 21–32)
CREAT SERPL-MCNC: 1.13 MG/DL (ref 0.6–1.3)
GFR SERPL CREATININE-BSD FRML MDRD: 44 ML/MIN/1.73SQ M
GLUCOSE SERPL-MCNC: 93 MG/DL (ref 65–140)
POTASSIUM SERPL-SCNC: 4.5 MMOL/L (ref 3.5–5.3)
SODIUM SERPL-SCNC: 128 MMOL/L (ref 136–145)

## 2021-03-26 PROCEDURE — 36415 COLL VENOUS BLD VENIPUNCTURE: CPT | Performed by: INTERNAL MEDICINE

## 2021-03-26 PROCEDURE — 80048 BASIC METABOLIC PNL TOTAL CA: CPT | Performed by: INTERNAL MEDICINE

## 2021-03-30 ENCOUNTER — OFFICE VISIT (OUTPATIENT)
Dept: CARDIOLOGY CLINIC | Facility: CLINIC | Age: 86
End: 2021-03-30
Payer: MEDICARE

## 2021-03-30 ENCOUNTER — TELEPHONE (OUTPATIENT)
Dept: CARDIOLOGY CLINIC | Facility: CLINIC | Age: 86
End: 2021-03-30

## 2021-03-30 VITALS
HEART RATE: 53 BPM | TEMPERATURE: 97.5 F | WEIGHT: 130.6 LBS | OXYGEN SATURATION: 98 % | DIASTOLIC BLOOD PRESSURE: 74 MMHG | BODY MASS INDEX: 26.33 KG/M2 | HEIGHT: 59 IN | SYSTOLIC BLOOD PRESSURE: 140 MMHG

## 2021-03-30 DIAGNOSIS — I10 ESSENTIAL HYPERTENSION: Primary | ICD-10-CM

## 2021-03-30 PROCEDURE — 99214 OFFICE O/P EST MOD 30 MIN: CPT | Performed by: INTERNAL MEDICINE

## 2021-03-30 NOTE — TELEPHONE ENCOUNTER
----- Message from Jenniffer Bell DO sent at 3/30/2021  9:56 AM EDT -----  Can you please call the patient's daughter this afternoon? I want her to stop the hydrochlorothiazide because her sodium level has dropped a little  Still get the blood test in 1 month    Thanks

## 2021-03-30 NOTE — TELEPHONE ENCOUNTER
Pt's daughter called back to confirm she got the message and is stopping the HCTZ and repeating labs in one month

## 2021-03-30 NOTE — PROGRESS NOTES
Cardiology   Tone Jung DO, Alexandra Bates MD, Parker Givens MD, Bj Wilder MD, [facility] [place]  -------------------------------------------------------------------  [facility]  [address], [address] [facility], [address] [place] [address], [address] [place] [address], [address] [place] [address]  [phone]    Cardiology Follow Up  Rio Stanley  1935  [id number]          Assessment/Plan:    1  Aortic valve disease with prior AVR  - recent echocardiogram showed normal valve function  Normal valve gradients without any regurgitation  2  Essential hypertension  - blood pressure is improved with increase in losartan to 100 mg daily   - Home blood pressure log reviewed  - Continue Toprol XL 25 mg daily   - Recent blood work was reviewed  Sodium has decreased  Recommend discontinuation of HCTZ     Will recheck blood work in 1 month  - continue home blood pressure monitoring  3  Heart block, first degree  - heart rate has remained stable between 55 and 60 beats per minute  4  Sinus luis angel-tachy syndrome (HCC)  - heart rate normal today  5  Peripheral arteriosclerosis ([address] )  - continue aspirin    6  Bilateral leg edema  - resolved with discontinuation of amlodipine  7  Mixed hyperlipidemia  - last LDL cholesterol was less than 70 mg/dL  Statin was discontinued  Interval History:     Rio Stanley is 80 y o  female here for followup of hypertension  She was last seen 2 weeks ago and losartan was increased to 100 mg daily because of elevated BP  Previously, she was having bilateral LE edema that improved with discontinuation of amlodipine  Home blood pressure log was reviewed  Blood pressure is significantly improved  She denies any chest pain, shortness of breath, lower extremity edema, orthopnea or paroxysmal nocturnal dyspnea      In November 2020, blood pressure was markedly elevated during that visit and hydrochlorothiazide 25 mg daily was added to her medication regimen  Previously, she developed lower extremity edema which improved with amlodipine discontinuation  Since then, blood pressure has been elevated  She was started on Toprol XL 25 mg daily along with losartan 50 mg daily  The patient has a history of aortic valve replacement  In June 2015, she had aortic valve replacement at [facility] for aortic valve stenosis  She has no history of coronary artery disease          Past Medical History:   Diagnosis Date    Aortic valve disease     Glaucoma     Heart block, first degree     Hyperlipidemia     Hypertension     Sinus luis angel-tachy syndrome (HCC)     SSS (sick sinus syndrome) (HCC)      Social History     Socioeconomic History    Marital status: /Civil Union     Spouse name: Not on file    Number of children: Not on file    Years of education: Not on file    Highest education level: Not on file   Occupational History    Not on file   Social Needs    Financial resource strain: Not on file    Food insecurity     Worry: Not on file     Inability: Not on file   [employer] needs     Medical: Not on file     Non-medical: Not on file   Tobacco Use    Smoking status: Never Smoker    Smokeless tobacco: Never Used   Substance and Sexual Activity    Alcohol use: Not Currently     Frequency: Never     Binge frequency: Never    Drug use: Never    Sexual activity: Not on file   Lifestyle    Physical activity     Days per week: Not on file     Minutes per session: Not on file    Stress: Not on file   Relationships    Social connections     Talks on phone: Not on file     Gets together: Not on file     Attends Taoism service: Not on file     Active member of club or organization: Not on file     Attends meetings of clubs or organizations: Not on file     Relationship status: Not on file    Intimate partner violence     Fear of current or ex partner: Not on file     Emotionally abused: Not on file Physically abused: Not on file     Forced sexual activity: Not on file   Other Topics Concern    Not on file   Social History Narrative    Not on file      Family History   Problem Relation Age of Onset    Heart disease Mother     Hypertension Mother     Diabetes Mother     Heart disease Father     Heart attack Father          at 54    Mental illness Neg Hx      Past Surgical History:   Procedure Laterality Date    AORTIC VALVE REPLACEMENT  2015    Porcine valve       Current Outpatient Medications:     acetaminophen (TYLENOL) 325 mg tablet, Take 3 tablets (975 mg total) by mouth every 8 (eight) hours, Disp: 30 tablet, Rfl: 0    Ascorbic Acid (vitamin C) 100 MG tablet, Take 100 mg by mouth daily, Disp: , Rfl:     aspirin (ECOTRIN LOW STRENGTH) 81 mg EC tablet, Take 1 tablet (81 mg total) by mouth daily, Disp:  , Rfl: 0    Biotin 1 MG CAPS, Take by mouth, Disp: , Rfl:     famotidine (PEPCID) 20 mg tablet, Take 20 mg by mouth daily , Disp: , Rfl:     hydrochlorothiazide (HYDRODIURIL) 25 mg tablet, Take 1 tablet (25 mg total) by mouth daily, Disp: 30 tablet, Rfl: 5    losartan (COZAAR) 100 MG tablet, Take 1 tablet (100 mg total) by mouth daily, Disp: 30 tablet, Rfl: 2    LUMIGAN 0 01 % ophthalmic drops, , Disp: , Rfl:     metoprolol succinate (TOPROL-XL) 25 mg 24 hr tablet, Take 1 tablet (25 mg total) by mouth daily, Disp: 30 tablet, Rfl: 5    timolol (TIMOPTIC-XE) 0 5 % ophthalmic gel-forming, , Disp: , Rfl:     losartan (COZAAR) 50 mg tablet, TAKE ONE TABLET BY MOUTH EVERY DAY (Patient not taking: Reported on 3/30/2021), Disp: 30 tablet, Rfl: 2        Review of Systems:  Review of Systems   Constitutional: Negative for chills and fever  HENT: Negative for ear pain and sore throat  Eyes: Negative for pain and visual disturbance  Respiratory: Negative for cough and shortness of breath  Cardiovascular: Negative for chest pain and palpitations     Gastrointestinal: Negative for abdominal pain and vomiting  Genitourinary: Negative for dysuria and hematuria  Musculoskeletal: Positive for arthralgias  Negative for back pain  Skin: Negative for color change and rash  Neurological: Negative for seizures and syncope  All other systems reviewed and are negative  Physical Exam:  Vitals:  Vitals:    03/30/21 0914   BP: 140/74   BP Location: Left arm   Patient Position: Sitting   Cuff Size: Standard   Pulse: (!) 53   Temp: 97 5 °F (36 4 °C)   TempSrc: Temporal   SpO2: 98%   Weight: 59 2 kg (130 lb 9 6 oz)   Height: 4' 11" (1 499 m)     Physical Exam   Constitutional: She appears healthy  No distress  Eyes: Pupils are equal, round, and reactive to light  Conjunctivae are normal    Neck: Normal range of motion  Neck supple  No JVD present  Cardiovascular: Normal rate and regular rhythm  Exam reveals no gallop and no friction rub  Murmur heard  Pulmonary/Chest: Effort normal and breath sounds normal  She has no wheezes  She has no rales  Musculoskeletal:         General: No tenderness, deformity or edema  Neurological: She is alert and oriented to person, place, and time  Skin: Skin is warm and dry  Cardiographics:  Last known EF:  55-60% with normal bioprosthetic aortic valve  This note was completed in part utilizing [employer] Direct Software  Grammatical errors, random word insertions, spelling mistakes, and incomplete sentences can be an occasional consequence of this system secondary to software limitations, ambient noise, and hardware issues  If you have any questions or concerns about the content, text, or information contained within the body of this dictation, please contact the provider for clarification

## 2021-04-10 DIAGNOSIS — I10 ESSENTIAL HYPERTENSION: ICD-10-CM

## 2021-04-10 DIAGNOSIS — I44.0 HEART BLOCK, FIRST DEGREE: ICD-10-CM

## 2021-04-12 RX ORDER — METOPROLOL SUCCINATE 25 MG/1
TABLET, EXTENDED RELEASE ORAL
Qty: 30 TABLET | Refills: 5 | Status: SHIPPED | OUTPATIENT
Start: 2021-04-12 | End: 2021-07-20 | Stop reason: SDUPTHER

## 2021-05-26 ENCOUNTER — LAB (OUTPATIENT)
Dept: LAB | Facility: CLINIC | Age: 86
End: 2021-05-26
Payer: MEDICARE

## 2021-05-26 DIAGNOSIS — I49.5 SINUS BRADY-TACHY SYNDROME (HCC): ICD-10-CM

## 2021-05-26 DIAGNOSIS — I70.209 PERIPHERAL ARTERIOSCLEROSIS (HCC): ICD-10-CM

## 2021-05-26 DIAGNOSIS — R60.0 BILATERAL LEG EDEMA: ICD-10-CM

## 2021-05-26 DIAGNOSIS — Z23 NEED FOR VACCINATION: ICD-10-CM

## 2021-05-26 DIAGNOSIS — E78.2 MIXED HYPERLIPIDEMIA: ICD-10-CM

## 2021-05-26 DIAGNOSIS — I10 ESSENTIAL HYPERTENSION: ICD-10-CM

## 2021-05-26 LAB
ALBUMIN SERPL BCP-MCNC: 3.2 G/DL (ref 3.5–5)
ALP SERPL-CCNC: 84 U/L (ref 46–116)
ALT SERPL W P-5'-P-CCNC: 14 U/L (ref 12–78)
ANION GAP SERPL CALCULATED.3IONS-SCNC: 5 MMOL/L (ref 4–13)
AST SERPL W P-5'-P-CCNC: 18 U/L (ref 5–45)
BILIRUB SERPL-MCNC: 0.41 MG/DL (ref 0.2–1)
BUN SERPL-MCNC: 19 MG/DL (ref 5–25)
CALCIUM ALBUM COR SERPL-MCNC: 9.6 MG/DL (ref 8.3–10.1)
CALCIUM SERPL-MCNC: 9 MG/DL (ref 8.3–10.1)
CHLORIDE SERPL-SCNC: 107 MMOL/L (ref 100–108)
CHOLEST SERPL-MCNC: 185 MG/DL (ref 50–200)
CO2 SERPL-SCNC: 30 MMOL/L (ref 21–32)
CREAT SERPL-MCNC: 0.89 MG/DL (ref 0.6–1.3)
ERYTHROCYTE [DISTWIDTH] IN BLOOD BY AUTOMATED COUNT: 14.5 % (ref 11.6–15.1)
GFR SERPL CREATININE-BSD FRML MDRD: 59 ML/MIN/1.73SQ M
GLUCOSE P FAST SERPL-MCNC: 86 MG/DL (ref 65–99)
HCT VFR BLD AUTO: 39.4 % (ref 34.8–46.1)
HDLC SERPL-MCNC: 56 MG/DL
HGB BLD-MCNC: 12.6 G/DL (ref 11.5–15.4)
LDLC SERPL CALC-MCNC: 115 MG/DL (ref 0–100)
MCH RBC QN AUTO: 30 PG (ref 26.8–34.3)
MCHC RBC AUTO-ENTMCNC: 32 G/DL (ref 31.4–37.4)
MCV RBC AUTO: 94 FL (ref 82–98)
PLATELET # BLD AUTO: 165 THOUSANDS/UL (ref 149–390)
PMV BLD AUTO: 10.7 FL (ref 8.9–12.7)
POTASSIUM SERPL-SCNC: 4.1 MMOL/L (ref 3.5–5.3)
PROT SERPL-MCNC: 7.5 G/DL (ref 6.4–8.2)
RBC # BLD AUTO: 4.2 MILLION/UL (ref 3.81–5.12)
SODIUM SERPL-SCNC: 142 MMOL/L (ref 136–145)
TRIGL SERPL-MCNC: 68 MG/DL
WBC # BLD AUTO: 4.15 THOUSAND/UL (ref 4.31–10.16)

## 2021-05-26 PROCEDURE — 36415 COLL VENOUS BLD VENIPUNCTURE: CPT

## 2021-05-26 PROCEDURE — 80053 COMPREHEN METABOLIC PANEL: CPT

## 2021-05-26 PROCEDURE — 85027 COMPLETE CBC AUTOMATED: CPT

## 2021-05-26 PROCEDURE — 80061 LIPID PANEL: CPT

## 2021-06-08 ENCOUNTER — OFFICE VISIT (OUTPATIENT)
Dept: PODIATRY | Facility: CLINIC | Age: 86
End: 2021-06-08
Payer: MEDICARE

## 2021-06-08 VITALS — HEIGHT: 59 IN | WEIGHT: 130 LBS | RESPIRATION RATE: 17 BRPM | BODY MASS INDEX: 26.21 KG/M2

## 2021-06-08 DIAGNOSIS — M79.672 PAIN IN BOTH FEET: ICD-10-CM

## 2021-06-08 DIAGNOSIS — I70.209 PERIPHERAL ARTERIOSCLEROSIS (HCC): Primary | ICD-10-CM

## 2021-06-08 DIAGNOSIS — L84 CORNS: ICD-10-CM

## 2021-06-08 DIAGNOSIS — M79.671 PAIN IN BOTH FEET: ICD-10-CM

## 2021-06-08 PROCEDURE — 11056 PARNG/CUTG B9 HYPRKR LES 2-4: CPT | Performed by: PODIATRIST

## 2021-06-14 DIAGNOSIS — I10 ESSENTIAL HYPERTENSION: Primary | ICD-10-CM

## 2021-06-14 RX ORDER — LOSARTAN POTASSIUM 100 MG/1
TABLET ORAL
Qty: 30 TABLET | Refills: 2 | Status: SHIPPED | OUTPATIENT
Start: 2021-06-14 | End: 2021-06-15 | Stop reason: SDUPTHER

## 2021-06-15 ENCOUNTER — OFFICE VISIT (OUTPATIENT)
Dept: CARDIOLOGY CLINIC | Facility: CLINIC | Age: 86
End: 2021-06-15
Payer: MEDICARE

## 2021-06-15 VITALS
DIASTOLIC BLOOD PRESSURE: 88 MMHG | HEIGHT: 59 IN | WEIGHT: 127 LBS | SYSTOLIC BLOOD PRESSURE: 172 MMHG | HEART RATE: 55 BPM | TEMPERATURE: 97.7 F | BODY MASS INDEX: 25.6 KG/M2 | OXYGEN SATURATION: 96 %

## 2021-06-15 DIAGNOSIS — E78.2 MIXED HYPERLIPIDEMIA: ICD-10-CM

## 2021-06-15 DIAGNOSIS — I49.5 SINUS BRADY-TACHY SYNDROME (HCC): Primary | ICD-10-CM

## 2021-06-15 DIAGNOSIS — Z95.2 S/P AORTIC VALVE REPLACEMENT: ICD-10-CM

## 2021-06-15 DIAGNOSIS — I10 ESSENTIAL HYPERTENSION: ICD-10-CM

## 2021-06-15 PROCEDURE — 93000 ELECTROCARDIOGRAM COMPLETE: CPT | Performed by: INTERNAL MEDICINE

## 2021-06-15 PROCEDURE — 99214 OFFICE O/P EST MOD 30 MIN: CPT | Performed by: INTERNAL MEDICINE

## 2021-06-15 RX ORDER — FELODIPINE 5 MG/1
5 TABLET, EXTENDED RELEASE ORAL DAILY
Qty: 30 TABLET | Refills: 3 | Status: SHIPPED | OUTPATIENT
Start: 2021-06-15 | End: 2021-07-20 | Stop reason: SDUPTHER

## 2021-06-15 RX ORDER — LOSARTAN POTASSIUM 100 MG/1
100 TABLET ORAL DAILY
Qty: 30 TABLET | Refills: 2 | Status: SHIPPED | OUTPATIENT
Start: 2021-06-15 | End: 2021-07-20 | Stop reason: SDUPTHER

## 2021-06-15 NOTE — PROGRESS NOTES
Cardiology   Rosamaria Baltazar DO, Chika Krishnan MD, Ty Mcdonald MD, Yu Coronado MD, [facility] [place]  -------------------------------------------------------------------  [facility] ORTHOPEDIC [facility]  [address], [address] [facility], [address] [place] [address], [address] [place] [address]  [phone]    Cardiology Follow Up  Tano Roque  1935  [id number]          Assessment/Plan:    1  Aortic valve disease with prior AVR  - recent echocardiogram showed normal valve function  Normal valve gradients without any regurgitation  2  Essential hypertension  - blood pressure  Remains elevated  Home blood pressure log reviewed  -   Will continue Toprol XL 25 mg daily   Along with losartan 100 mg daily  -  Will add felodipine 5 mg daily  She had a edema with amlodipine but blood pressure was controlled  Monitor for lower extremity edema  -  Will recheck blood work in 1 month  - continue home blood pressure monitoring  3  Heart block, first degree  - heart rate has remained stable between 55 and 60 beats per minute  4  Sinus luis angel-tachy syndrome (HCC)  - heart rate normal today  5  Peripheral arteriosclerosis ([address] )  - continue aspirin    6  Bilateral leg edema  - resolved with discontinuation of amlodipine  7  Mixed hyperlipidemia  - last LDL cholesterol was less than 70 mg/dL  Statin was discontinued  Interval History:     Tano Roque is 80 y o  female here for followup of hypertension  BP continues to be elevated  During her last visit, hydrochlorothiazide was discontinued because hyponatremia  Losartan was increased  Home blood pressure log was reviewed  Blood pressure   Remains elevated  She denies any chest pain, shortness of breath, lower extremity edema, orthopnea or paroxysmal nocturnal dyspnea  In November 2020, blood pressure was markedly elevated during that visit and hydrochlorothiazide 25 mg daily was added to her medication regimen  Previously, she developed lower extremity edema which improved with amlodipine discontinuation  Since then, blood pressure has been elevated  She was started on Toprol XL 25 mg daily along with losartan 50 mg daily  Losartan was increased to 100 mg daily and Hydrochlorothiazide was stopped after developing hyponatremia  The patient has a history of aortic valve replacement  In June 2015, she had aortic valve replacement at [facility] for aortic valve stenosis  She has no history of coronary artery disease  Past Medical History:   Diagnosis Date    Aortic valve disease     Glaucoma     Heart block, first degree     Hyperlipidemia     Hypertension     Sinus luis angel-tachy syndrome (HCC)     SSS (sick sinus syndrome) (HCC)      Social History     Socioeconomic History    Marital status: /Civil Union     Spouse name: Not on file    Number of children: Not on file    Years of education: Not on file    Highest education level: Not on file   Occupational History    Not on file   Tobacco Use    Smoking status: Never Smoker    Smokeless tobacco: Never Used   Vaping Use    Vaping Use: Never used   Substance and Sexual Activity    Alcohol use: Not Currently    Drug use: Never    Sexual activity: Not on file   Other Topics Concern    Not on file   Social History Narrative    Not on file     Social Determinants of Health     Financial Resource Strain:     Difficulty of Paying Living Expenses:    Food Insecurity:     Worried About Running Out of Food in the Last Year:     [address] in the Last Year:    Transportation Needs:     Lack of Transportation (Medical):      Lack of Transportation (Non-Medical):    Physical Activity:     Days of Exercise per Week:     Minutes of Exercise per Session:    Stress:     Feeling of Stress :    Social Connections:     Frequency of Communication with Friends and Family:     Frequency of Social Gatherings with Friends and Family:     Attends Alevism Services:     Active Member of Clubs or Organizations:     Attends Club or Organization Meetings:     Marital Status:    Intimate Partner Violence:     Fear of Current or Ex-Partner:     Emotionally Abused:     Physically Abused:     Sexually Abused:       Family History   Problem Relation Age of Onset    Heart disease Mother     Hypertension Mother     Diabetes Mother     Heart disease Father     Heart attack Father          at 54    Mental illness Neg Hx      Past Surgical History:   Procedure Laterality Date    AORTIC VALVE REPLACEMENT  2015    Porcine valve       Current Outpatient Medications:     acetaminophen (TYLENOL) 325 mg tablet, Take 3 tablets (975 mg total) by mouth every 8 (eight) hours, Disp: 30 tablet, Rfl: 0    Ascorbic Acid (vitamin C) 100 MG tablet, Take 100 mg by mouth daily, Disp: , Rfl:     aspirin (ECOTRIN LOW STRENGTH) 81 mg EC tablet, Take 1 tablet (81 mg total) by mouth daily, Disp:  , Rfl: 0    Biotin 1 MG CAPS, Take by mouth, Disp: , Rfl:     famotidine (PEPCID) 20 mg tablet, Take 20 mg by mouth daily , Disp: , Rfl:     losartan (COZAAR) 100 MG tablet, TAKE ONE TABLET BY MOUTH EVERY DAY, Disp: 30 tablet, Rfl: 2    LUMIGAN 0 01 % ophthalmic drops, , Disp: , Rfl:     metoprolol succinate (TOPROL-XL) 25 mg 24 hr tablet, TAKE ONE TABLET BY MOUTH EVERY DAY, Disp: 30 tablet, Rfl: 5    timolol (TIMOPTIC-XE) 0 5 % ophthalmic gel-forming, , Disp: , Rfl:         Review of Systems:  Review of Systems   Constitutional: Negative for chills and fever  HENT: Negative for ear pain and sore throat  Eyes: Negative for pain and visual disturbance  Respiratory: Negative for cough and shortness of breath  Cardiovascular: Negative for chest pain and palpitations  Gastrointestinal: Negative for abdominal pain and vomiting  Genitourinary: Negative for dysuria and hematuria  Musculoskeletal: Positive for arthralgias   Negative for back pain    Skin: Negative for color change and rash  Neurological: Negative for seizures and syncope  All other systems reviewed and are negative  Physical Exam:  Vitals:  Vitals:    06/15/21 1001   BP: (!) 172/88   BP Location: Right arm   Patient Position: Sitting   Cuff Size: Standard   Pulse: 55   Temp: 97 7 °F (36 5 °C)   SpO2: 96%   Weight: 57 6 kg (127 lb)   Height: 4' 11" (1 499 m)     Physical Exam   Constitutional: She appears healthy  No distress  Eyes: Pupils are equal, round, and reactive to light  Conjunctivae are normal    Neck: No JVD present  Cardiovascular: Normal rate and regular rhythm  Exam reveals no gallop and no friction rub  Murmur heard  Pulmonary/Chest: Effort normal and breath sounds normal  She has no wheezes  She has no rales  Musculoskeletal:         General: No tenderness, deformity or edema  Cervical back: Normal range of motion and neck supple  Neurological: She is alert and oriented to person, place, and time  Skin: Skin is warm and dry  Cardiographics:  Last known EF:  55-60% with normal bioprosthetic aortic valve  This note was completed in part utilizing [employer] Direct Software  Grammatical errors, random word insertions, spelling mistakes, and incomplete sentences can be an occasional consequence of this system secondary to software limitations, ambient noise, and hardware issues  If you have any questions or concerns about the content, text, or information contained within the body of this dictation, please contact the provider for clarification

## 2021-07-20 ENCOUNTER — OFFICE VISIT (OUTPATIENT)
Dept: CARDIOLOGY CLINIC | Facility: CLINIC | Age: 86
End: 2021-07-20
Payer: MEDICARE

## 2021-07-20 VITALS
BODY MASS INDEX: 26.21 KG/M2 | SYSTOLIC BLOOD PRESSURE: 128 MMHG | DIASTOLIC BLOOD PRESSURE: 74 MMHG | OXYGEN SATURATION: 98 % | HEART RATE: 56 BPM | WEIGHT: 130 LBS | HEIGHT: 59 IN | TEMPERATURE: 98.4 F

## 2021-07-20 DIAGNOSIS — I10 ESSENTIAL HYPERTENSION: ICD-10-CM

## 2021-07-20 DIAGNOSIS — I44.0 HEART BLOCK, FIRST DEGREE: ICD-10-CM

## 2021-07-20 PROCEDURE — 99214 OFFICE O/P EST MOD 30 MIN: CPT | Performed by: INTERNAL MEDICINE

## 2021-07-20 RX ORDER — METOPROLOL SUCCINATE 25 MG/1
25 TABLET, EXTENDED RELEASE ORAL DAILY
Qty: 90 TABLET | Refills: 3 | Status: SHIPPED | OUTPATIENT
Start: 2021-07-20 | End: 2022-07-29

## 2021-07-20 RX ORDER — FELODIPINE 5 MG/1
5 TABLET, EXTENDED RELEASE ORAL DAILY
Qty: 90 TABLET | Refills: 3 | Status: SHIPPED | OUTPATIENT
Start: 2021-07-20 | End: 2021-10-22

## 2021-07-20 RX ORDER — LOSARTAN POTASSIUM 100 MG/1
100 TABLET ORAL DAILY
Qty: 90 TABLET | Refills: 3 | Status: SHIPPED | OUTPATIENT
Start: 2021-07-20

## 2021-07-20 NOTE — PROGRESS NOTES
Cardiology   Adela Thompson DO, Dorothea Khan MD, Gaurav Botello MD, Juliano Diego MD, [facility] - [place]  -------------------------------------------------------------------  [facility]  [address], [address], [address] [place] [address], [facility] [facility]  [phone]    Cardiology Follow Up  Jennie Singletary  1935  [id number]          Assessment/Plan:    1  Aortic valve disease with prior AVR  - recent echocardiogram showed normal valve function  Normal valve gradients without any regurgitation  2  Essential hypertension  - blood pressure significantly improved with addition of felodipine  Home blood pressure log reviewed  -   Will continue Toprol XL 25 mg daily   Along with losartan 100 mg daily  -  Will continue felodipine 5 mg daily  She had a edema with amlodipine but blood pressure was controlled  Monitor for lower extremity edema  - Avoid HCTZ because of hyponatremia  -  Will recheck blood work in 3 months  - continue home blood pressure monitoring  3  Heart block, first degree  - heart rate has remained stable between 55 and 60 beats per minute  4  Sinus luis angel-tachy syndrome (HCC)  - heart rate normal today  5  Peripheral arteriosclerosis ([address] )  - continue aspirin    6  Bilateral leg edema  - resolved with discontinuation of amlodipine  7  Mixed hyperlipidemia  - last LDL cholesterol was less than 70 mg/dL  Statin was discontinued  Interval History:     Jennie Singletary is 80 y o  female here for followup of hypertension  during her last visit, felodipine 5 mg daily was added because of elevated blood pressure  Since her last visit, blood pressure has improved  Her home blood pressure log was reviewed  She denies any chest pain, shortness of breath, lower extremity edema, orthopnea or paroxysmal nocturnal dyspnea      In November 2020, blood pressure was markedly elevated during that visit and hydrochlorothiazide 25 mg daily was added to her medication regimen  Previously, she developed lower extremity edema which improved with amlodipine discontinuation  Since then, blood pressure has been elevated  She was started on Toprol XL 25 mg daily along with losartan 50 mg daily  Losartan was increased to 100 mg daily and Hydrochlorothiazide was stopped after developing hyponatremia  The patient has a history of aortic valve replacement  In June 2015, she had aortic valve replacement at [facility] for aortic valve stenosis  She has no history of coronary artery disease  Past Medical History:   Diagnosis Date    Aortic valve disease     Glaucoma     Heart block, first degree     Hyperlipidemia     Hypertension     Sinus luis angel-tachy syndrome (HCC)     SSS (sick sinus syndrome) (HCC)      Social History     Socioeconomic History    Marital status: /Civil Union     Spouse name: Not on file    Number of children: Not on file    Years of education: Not on file    Highest education level: Not on file   Occupational History    Not on file   Tobacco Use    Smoking status: Never Smoker    Smokeless tobacco: Never Used   Vaping Use    Vaping Use: Never used   Substance and Sexual Activity    Alcohol use: Not Currently    Drug use: Never    Sexual activity: Not on file   Other Topics Concern    Not on file   Social History Narrative    Not on file     Social Determinants of Health     Financial Resource Strain:     Difficulty of Paying Living Expenses:    Food Insecurity:     Worried About Running Out of Food in the Last Year:     [address] in the Last Year:    Transportation Needs:     Lack of Transportation (Medical):      Lack of Transportation (Non-Medical):    Physical Activity:     Days of Exercise per Week:     Minutes of Exercise per Session:    Stress:     Feeling of Stress :    Social Connections:     Frequency of Communication with Friends and Family:     Frequency of Social Gatherings with Friends and Family:     Attends Mormonism Services:     Active Member of Clubs or Organizations:     Attends Club or Organization Meetings:     Marital Status:    Intimate Partner Violence:     Fear of Current or Ex-Partner:     Emotionally Abused:     Physically Abused:     Sexually Abused:       Family History   Problem Relation Age of Onset    Heart disease Mother     Hypertension Mother     Diabetes Mother     Heart disease Father     Heart attack Father          at 54    Mental illness Neg Hx      Past Surgical History:   Procedure Laterality Date    AORTIC VALVE REPLACEMENT  2015    Porcine valve       Current Outpatient Medications:     acetaminophen (TYLENOL) 325 mg tablet, Take 3 tablets (975 mg total) by mouth every 8 (eight) hours, Disp: 30 tablet, Rfl: 0    Ascorbic Acid (vitamin C) 100 MG tablet, Take 100 mg by mouth daily, Disp: , Rfl:     aspirin (ECOTRIN LOW STRENGTH) 81 mg EC tablet, Take 1 tablet (81 mg total) by mouth daily, Disp:  , Rfl: 0    Biotin 1 MG CAPS, Take by mouth, Disp: , Rfl:     famotidine (PEPCID) 20 mg tablet, Take 20 mg by mouth daily , Disp: , Rfl:     felodipine (PLENDIL) 5 mg 24 hr tablet, Take 1 tablet (5 mg total) by mouth daily, Disp: 30 tablet, Rfl: 3    losartan (COZAAR) 100 MG tablet, Take 1 tablet (100 mg total) by mouth daily, Disp: 30 tablet, Rfl: 2    LUMIGAN 0 01 % ophthalmic drops, , Disp: , Rfl:     metoprolol succinate (TOPROL-XL) 25 mg 24 hr tablet, TAKE ONE TABLET BY MOUTH EVERY DAY, Disp: 30 tablet, Rfl: 5    timolol (TIMOPTIC-XE) 0 5 % ophthalmic gel-forming, , Disp: , Rfl:         Review of Systems:  Review of Systems   Respiratory: Negative for shortness of breath  Cardiovascular: Negative for chest pain, palpitations and leg swelling  Musculoskeletal: Positive for arthralgias  All other systems reviewed and are negative          Physical Exam:  Vitals:  Vitals:    07/20/21 0943   BP: 128/74   BP Location: Left arm   Patient Position: Sitting   Cuff Size: Standard   Pulse: 56   Temp: 98 4 °F (36 9 °C)   TempSrc: Temporal   SpO2: 98%   Weight: 59 kg (130 lb)   Height: 4' 11" (1 499 m)     Physical Exam   Constitutional: She appears healthy  No distress  Eyes: Pupils are equal, round, and reactive to light  Conjunctivae are normal    Neck: No JVD present  Cardiovascular: Normal rate and regular rhythm  Exam reveals no gallop and no friction rub  Murmur heard  Pulmonary/Chest: Effort normal and breath sounds normal  She has no wheezes  She has no rales  Musculoskeletal:         General: No tenderness, deformity or edema  Cervical back: Normal range of motion and neck supple  Neurological: She is alert and oriented to person, place, and time  Skin: Skin is warm and dry  Cardiographics:  Last known EF:  55-60% with normal bioprosthetic aortic valve  This note was completed in part utilizing [employer] Direct Software  Grammatical errors, random word insertions, spelling mistakes, and incomplete sentences can be an occasional consequence of this system secondary to software limitations, ambient noise, and hardware issues  If you have any questions or concerns about the content, text, or information contained within the body of this dictation, please contact the provider for clarification

## 2021-08-17 ENCOUNTER — OFFICE VISIT (OUTPATIENT)
Dept: PODIATRY | Facility: CLINIC | Age: 86
End: 2021-08-17
Payer: MEDICARE

## 2021-08-17 VITALS
SYSTOLIC BLOOD PRESSURE: 136 MMHG | WEIGHT: 130 LBS | DIASTOLIC BLOOD PRESSURE: 78 MMHG | HEIGHT: 59 IN | BODY MASS INDEX: 26.21 KG/M2 | RESPIRATION RATE: 16 BRPM

## 2021-08-17 DIAGNOSIS — M79.671 PAIN IN BOTH FEET: ICD-10-CM

## 2021-08-17 DIAGNOSIS — L84 CORNS: ICD-10-CM

## 2021-08-17 DIAGNOSIS — I70.209 PERIPHERAL ARTERIOSCLEROSIS (HCC): Primary | ICD-10-CM

## 2021-08-17 DIAGNOSIS — M79.672 PAIN IN BOTH FEET: ICD-10-CM

## 2021-08-17 PROCEDURE — 11056 PARNG/CUTG B9 HYPRKR LES 2-4: CPT | Performed by: PODIATRIST

## 2021-09-16 RX ORDER — MELATONIN
1000
COMMUNITY

## 2021-09-16 NOTE — PRE-PROCEDURE INSTRUCTIONS
My Surgical Experience    The following information was developed to assist you to prepare for your operation  What do I need to do before coming to the hospital?   Arrange for a responsible person to drive you to and from the hospital    Arrange care for your children at home  Children are not allowed in the recovery areas of the hospital   Plan to wear clothing that is easy to put on and take off  If you are having shoulder surgery, wear a shirt that buttons or zippers in the front  Bathing  o Shower the evening before and the morning of your surgery with an antibacterial soap  Please refer to the Pre Op Showering Instructions for Surgery Patients Sheet   o Remove nail polish and all body piercing jewelry  o Do not shave any body part for at least 24 hours before surgery-this includes face, arms, legs and upper body  Food  o Nothing to eat or drink after midnight the night before your surgery  This includes candy and chewing gum  o Exception: If your surgery is after 12:00pm (noon), you may have clear liquids such as 7-Up®, ginger ale, apple or cranberry juice, Jell-O®, water, or clear broth until 8:00 am  o Do not drink milk or juice with pulp on the morning before surgery  o Do not drink alcohol 24 hours before surgery  Medicine  o Follow instructions you received from your surgeon about which medicines you may take on the day of surgery  o If instructed to take medicine on the morning of surgery, take pills with just a small sip of water  Call your prescribing doctor for specific infroamtion on what to do if you take insulin    What should I bring to the hospital?    Bring:  Nonnie Senters or a walker, if you have them, for foot or knee surgery   A list of the daily medicines, vitamins, minerals, herbals and nutritional supplements you take   Include the dosages of medicines and the time you take them each day   Glasses, dentures or hearing aids   Minimal clothing; you will be wearing hospital shabnam   Photo ID; required to verify your identity   If you have a Living Will or Power of , bring a copy of the documents   If you have an ostomy, bring an extra pouch and any supplies you use    Do not bring   Medicines or inhalers   Money, valuables or jewelry    What other information should I know about the day of surgery?  Notify your surgeons if you develop a cold, sore throat, cough, fever, rash or any other illness   Report to the Ambulatory Surgical/Same Day Surgery Unit   You will be instructed to stop at Registration only if you have not been pre-registered   Inform your  fi they do not stay that they will be asked by the staff to leave a phone number where they can be reached   Be available to be reached before surgery  In the event the operating room schedule changes, you may be asked to come in earlier or later than expected    *It is important to tell your doctor and others involved in your health care if you are taking or have been taking any non-prescription drugs, vitamins, minerals, herbals or other nutritional supplements  Any of these may interact with some food or medicines and cause a reaction      Pre-Surgery Instructions:   Medication Instructions    acetaminophen (TYLENOL) 325 mg tablet Instructed patient per Anesthesia Guidelines   Ascorbic Acid (vitamin C) 100 MG tablet Instructed patient per Anesthesia Guidelines   aspirin (ECOTRIN LOW STRENGTH) 81 mg EC tablet Instructed patient per Anesthesia Guidelines   Biotin 1 MG CAPS Instructed patient per Anesthesia Guidelines   cholecalciferol (VITAMIN D3) 1,000 units tablet Instructed patient per Anesthesia Guidelines   felodipine (PLENDIL) 5 mg 24 hr tablet Instructed patient per Anesthesia Guidelines   losartan (COZAAR) 100 MG tablet Instructed patient per Anesthesia Guidelines   LUMIGAN 0 01 % ophthalmic drops Instructed patient per Anesthesia Guidelines      metoprolol succinate (TOPROL-XL) 25 mg 24 hr tablet Instructed patient per Anesthesia Guidelines   timolol (TIMOPTIC-XE) 0 5 % ophthalmic gel-forming Instructed patient per Anesthesia Guidelines  To take plendil, metoprolol and timolol eye drops along with pre-op eye drops a m  of surgery  To wear a button down blouse or loose top

## 2021-09-17 PROCEDURE — U0005 INFEC AGEN DETEC AMPLI PROBE: HCPCS | Performed by: OPHTHALMOLOGY

## 2021-09-17 PROCEDURE — U0003 INFECTIOUS AGENT DETECTION BY NUCLEIC ACID (DNA OR RNA); SEVERE ACUTE RESPIRATORY SYNDROME CORONAVIRUS 2 (SARS-COV-2) (CORONAVIRUS DISEASE [COVID-19]), AMPLIFIED PROBE TECHNIQUE, MAKING USE OF HIGH THROUGHPUT TECHNOLOGIES AS DESCRIBED BY CMS-2020-01-R: HCPCS | Performed by: OPHTHALMOLOGY

## 2021-09-23 ENCOUNTER — HOSPITAL ENCOUNTER (OUTPATIENT)
Facility: AMBULARY SURGERY CENTER | Age: 86
Setting detail: OUTPATIENT SURGERY
Discharge: HOME | End: 2021-09-23
Attending: OPHTHALMOLOGY | Admitting: OPHTHALMOLOGY
Payer: MEDICARE

## 2021-09-23 ENCOUNTER — ANESTHESIA (OUTPATIENT)
Dept: PERIOP | Facility: AMBULARY SURGERY CENTER | Age: 86
End: 2021-09-23
Payer: MEDICARE

## 2021-09-23 ENCOUNTER — ANESTHESIA EVENT (OUTPATIENT)
Dept: PERIOP | Facility: AMBULARY SURGERY CENTER | Age: 86
End: 2021-09-23
Payer: MEDICARE

## 2021-09-23 VITALS
DIASTOLIC BLOOD PRESSURE: 86 MMHG | RESPIRATION RATE: 16 BRPM | HEART RATE: 60 BPM | OXYGEN SATURATION: 97 % | SYSTOLIC BLOOD PRESSURE: 170 MMHG | TEMPERATURE: 97.4 F

## 2021-09-23 DIAGNOSIS — Z20.822 COVID-19 RULED OUT BY LABORATORY TESTING: Primary | ICD-10-CM

## 2021-09-23 DIAGNOSIS — H25.11 AGE-RELATED NUCLEAR CATARACT OF RIGHT EYE: ICD-10-CM

## 2021-09-23 PROCEDURE — V2632 POST CHMBR INTRAOCULAR LENS: HCPCS | Performed by: OPHTHALMOLOGY

## 2021-09-23 DEVICE — ACRYSOF(R) IQ ASPHERIC NATURAL IOL, SINGLE-PIECE ACRYLIC FOLDABLE PCL, UV WITH BLUE LIGHTFILTER, 13.0MM LENGTH, 6.0MM ANTERIORASYMMETRIC BICONVEX OPTIC, PLANAR HAPTICS.
Type: IMPLANTABLE DEVICE | Site: EYE | Status: FUNCTIONAL
Brand: ACRYSOF®

## 2021-09-23 RX ORDER — MIDAZOLAM HYDROCHLORIDE 2 MG/2ML
INJECTION, SOLUTION INTRAMUSCULAR; INTRAVENOUS AS NEEDED
Status: DISCONTINUED | OUTPATIENT
Start: 2021-09-23 | End: 2021-09-23

## 2021-09-23 RX ORDER — GATIFLOXACIN 5 MG/ML
SOLUTION/ DROPS OPHTHALMIC AS NEEDED
Status: DISCONTINUED | OUTPATIENT
Start: 2021-09-23 | End: 2021-09-23 | Stop reason: HOSPADM

## 2021-09-23 RX ORDER — KETOROLAC TROMETHAMINE 5 MG/ML
1 SOLUTION OPHTHALMIC
Status: ACTIVE | OUTPATIENT
Start: 2021-09-23 | End: 2021-09-23

## 2021-09-23 RX ORDER — KETOROLAC TROMETHAMINE 5 MG/ML
1 SOLUTION OPHTHALMIC 4 TIMES DAILY
Qty: 5 ML | Refills: 0
Start: 2021-09-23 | End: 2022-02-11

## 2021-09-23 RX ORDER — LIDOCAINE HYDROCHLORIDE 10 MG/ML
INJECTION, SOLUTION EPIDURAL; INFILTRATION; INTRACAUDAL; PERINEURAL AS NEEDED
Status: DISCONTINUED | OUTPATIENT
Start: 2021-09-23 | End: 2021-09-23 | Stop reason: HOSPADM

## 2021-09-23 RX ORDER — TETRACAINE HYDROCHLORIDE 5 MG/ML
1 SOLUTION OPHTHALMIC ONCE
Status: COMPLETED | OUTPATIENT
Start: 2021-09-23 | End: 2021-09-23

## 2021-09-23 RX ORDER — BALANCED SALT SOLUTION 6.4; .75; .48; .3; 3.9; 1.7 MG/ML; MG/ML; MG/ML; MG/ML; MG/ML; MG/ML
SOLUTION OPHTHALMIC AS NEEDED
Status: DISCONTINUED | OUTPATIENT
Start: 2021-09-23 | End: 2021-09-23 | Stop reason: HOSPADM

## 2021-09-23 RX ORDER — CIPROFLOXACIN HYDROCHLORIDE 3.5 MG/ML
1 SOLUTION/ DROPS TOPICAL 4 TIMES DAILY
Qty: 5 ML | Refills: 0
Start: 2021-09-23 | End: 2022-02-11

## 2021-09-23 RX ORDER — PHENYLEPHRINE HCL 2.5 %
1 DROPS OPHTHALMIC (EYE)
Status: ACTIVE | OUTPATIENT
Start: 2021-09-23 | End: 2021-09-23

## 2021-09-23 RX ORDER — LIDOCAINE HYDROCHLORIDE 20 MG/ML
1 JELLY TOPICAL
Status: COMPLETED | OUTPATIENT
Start: 2021-09-23 | End: 2021-09-23

## 2021-09-23 RX ORDER — TETRACAINE HYDROCHLORIDE 5 MG/ML
SOLUTION OPHTHALMIC AS NEEDED
Status: DISCONTINUED | OUTPATIENT
Start: 2021-09-23 | End: 2021-09-23 | Stop reason: HOSPADM

## 2021-09-23 RX ORDER — CYCLOPENTOLATE HYDROCHLORIDE 10 MG/ML
1 SOLUTION/ DROPS OPHTHALMIC
Status: ACTIVE | OUTPATIENT
Start: 2021-09-23 | End: 2021-09-23

## 2021-09-23 RX ADMIN — TETRACAINE HYDROCHLORIDE 1 DROP: 5 SOLUTION OPHTHALMIC at 12:05

## 2021-09-23 RX ADMIN — KETOROLAC TROMETHAMINE 1 DROP: 5 SOLUTION OPHTHALMIC at 12:45

## 2021-09-23 RX ADMIN — CYCLOPENTOLATE HYDROCHLORIDE 1 DROP: 10 SOLUTION/ DROPS OPHTHALMIC at 12:05

## 2021-09-23 RX ADMIN — LIDOCAINE HYDROCHLORIDE 1 APPLICATION: 20 JELLY TOPICAL at 12:05

## 2021-09-23 RX ADMIN — CYCLOPENTOLATE HYDROCHLORIDE 1 DROP: 10 SOLUTION/ DROPS OPHTHALMIC at 12:45

## 2021-09-23 RX ADMIN — CYCLOPENTOLATE HYDROCHLORIDE 1 DROP: 10 SOLUTION/ DROPS OPHTHALMIC at 12:25

## 2021-09-23 RX ADMIN — PHENYLEPHRINE HYDROCHLORIDE 1 DROP: 25 SOLUTION/ DROPS OPHTHALMIC at 12:45

## 2021-09-23 RX ADMIN — KETOROLAC TROMETHAMINE 1 DROP: 5 SOLUTION OPHTHALMIC at 12:25

## 2021-09-23 RX ADMIN — MIDAZOLAM 2 MG: 1 INJECTION INTRAMUSCULAR; INTRAVENOUS at 13:08

## 2021-09-23 RX ADMIN — LIDOCAINE HYDROCHLORIDE 1 APPLICATION: 20 JELLY TOPICAL at 12:45

## 2021-09-23 RX ADMIN — PHENYLEPHRINE HYDROCHLORIDE 1 DROP: 25 SOLUTION/ DROPS OPHTHALMIC at 12:05

## 2021-09-23 RX ADMIN — LIDOCAINE HYDROCHLORIDE 1 APPLICATION: 20 JELLY TOPICAL at 12:25

## 2021-09-23 RX ADMIN — PHENYLEPHRINE HYDROCHLORIDE 1 DROP: 25 SOLUTION/ DROPS OPHTHALMIC at 12:25

## 2021-09-23 RX ADMIN — KETOROLAC TROMETHAMINE 1 DROP: 5 SOLUTION OPHTHALMIC at 12:05

## 2021-09-23 NOTE — ANESTHESIA PREPROCEDURE EVALUATION
Procedure:  EXTRACTION EXTRACAPSULAR CATARACT PHACO INTRAOCULAR LENS (IOL) (Right Eye)    Relevant Problems   CARDIO   (+) Essential hypertension   (+) Heart block, first degree   (+) Mixed hyperlipidemia   (+) S/P aortic valve replacement   (+) Sinus luis angel-tachy syndrome (HCC)        Physical Exam    Airway    Mallampati score: II  TM Distance: >3 FB  Neck ROM: full     Dental   No notable dental hx     Cardiovascular  Rhythm: regular, Rate: normal, Cardiovascular exam normal    Pulmonary  Pulmonary exam normal Breath sounds clear to auscultation,     Other Findings        Anesthesia Plan  ASA Score- 3     Anesthesia Type- IV sedation with anesthesia with ASA Monitors  Additional Monitors:   Airway Plan:           Plan Factors-Exercise tolerance (METS): >4 METS  Chart reviewed  Existing labs reviewed  Patient summary reviewed  Patient is not a current smoker  Induction- intravenous  Postoperative Plan- Plan for postoperative opioid use  Informed Consent- Anesthetic plan and risks discussed with patient  I personally reviewed this patient with the CRNA  Discussed and agreed on the Anesthesia Plan with the CRNA  Humberto Le

## 2021-09-23 NOTE — DISCHARGE INSTRUCTIONS
Dr Milly Adame Cataract Instructions    Activity:     1  No Driving until instructed   2  Keep shield on until seen tomorrow except when administering drops   3  No heavy lifting   4  No water in eye     Diet:     1  Resume normal diet    Normal Symptoms:     1  Mild Headache   2  Scratchy or picky feeling around eye    Call the office if:     1  You have any questions or concerns   2  If eye pain is not relieved by extra strength tylenol    Office phone number:  [phone]      Next appointment:     1  See Dr Milly Adame at his office tomorrow as scheduled   ____9/24/21 at 7:45 am___________________________   2  Bring blue eye kit with you and eyedrops to the office    A new set of comprehensive instructions will be given and reviewed with you during your office visit tomorrow

## 2021-09-23 NOTE — ANESTHESIA POSTPROCEDURE EVALUATION
Post-Op Assessment Note    CV Status:  Stable  Pain Score: 0    Pain management: adequate     Mental Status:  Alert   Hydration Status:  Stable   PONV Controlled:  None   Airway Patency:  Patent   Two or more mitigation strategies used for obstructive sleep apnea   Post Op Vitals Reviewed: Yes      Staff: CRNA         No complications documented      BP  169/89   Temp     Pulse 54   Resp   20   SpO2   99

## 2021-09-23 NOTE — OP NOTE
OPERATIVE REPORT    PATIENT NAME: Jer Contreras    :  1935  MRN: [id number]  Pt Location: [facility] OR ROOM 01    Surgery Date: 2021    Surgeon(s) and Role:     * Percy Sandoval MD - Primary    Age-related nuclear cataract, right eye [H25 11]    Post-Op Diagnosis Codes:     * Age-related nuclear cataract, right eye [H25 11]    Procedure(s):  EXTRACTION EXTRACAPSULAR CATARACT PHACO INTRAOCULAR LENS (IOL)    Anesthesia Type:   IV Sedation with Anesthesia    Operative Indications:  Age-related nuclear cataract, right eye [H25 11]  Decreased vision to 20/200  With problems reading  Pt requested cataract sx the right eye    Procedure and Technique:    Procedure Details     The patient was brought in the OR in stable condition and placed on the operative table  The right eye was prepped and draped in the usual sterile manner  Attention was directed to the right eye where a lid speculum was placed  A 2 4 mm clear corneal incision was made temperally  1/2 cc of 1% MPF Lidocaine was irrigated into the anterior chamber followed by viscoat  The side port incision was placed superiorly  The capsularrhexis was made and the nucleus was hydrodissected with BSS  The nucleus was then removed with the phaco handpiece followed by removal of the cortical material with the I/A handpiece  The capsular bag was then filled with Provisc  The IOL was folded and placed in to the capsular bag and centered well  The remaining Provisc was removed from the eye with the I/A  The wounds were hydrated with BSS and found to be water tight  The lid speculum was removed and 2 drops of Gatifloxicin were placed over the cornea  A protective eye shield was taped over the eye and the patient went to PACU in stable condition  I will see the patient in the office tomorrow and the expected post op period is a few weeks         Complications: None        Disposition: PACU   Condition: Stable    SIGNATURE: Percy Sandoval MD  DATE:  2021  TIME: 1:30 PM

## 2021-10-22 ENCOUNTER — OFFICE VISIT (OUTPATIENT)
Dept: CARDIOLOGY CLINIC | Facility: CLINIC | Age: 86
End: 2021-10-22
Payer: MEDICARE

## 2021-10-22 VITALS
SYSTOLIC BLOOD PRESSURE: 168 MMHG | DIASTOLIC BLOOD PRESSURE: 78 MMHG | BODY MASS INDEX: 25.2 KG/M2 | OXYGEN SATURATION: 98 % | TEMPERATURE: 98.5 F | HEIGHT: 59 IN | WEIGHT: 125 LBS | HEART RATE: 56 BPM

## 2021-10-22 DIAGNOSIS — I10 ESSENTIAL HYPERTENSION: Primary | ICD-10-CM

## 2021-10-22 PROCEDURE — 99214 OFFICE O/P EST MOD 30 MIN: CPT | Performed by: INTERNAL MEDICINE

## 2021-10-22 RX ORDER — AMLODIPINE BESYLATE 5 MG/1
5 TABLET ORAL DAILY
Qty: 30 TABLET | Refills: 5 | Status: SHIPPED | OUTPATIENT
Start: 2021-10-22 | End: 2022-02-10 | Stop reason: SDUPTHER

## 2021-10-29 ENCOUNTER — OFFICE VISIT (OUTPATIENT)
Dept: PODIATRY | Facility: CLINIC | Age: 86
End: 2021-10-29
Payer: MEDICARE

## 2021-10-29 VITALS — WEIGHT: 125 LBS | BODY MASS INDEX: 25.2 KG/M2 | HEIGHT: 59 IN | RESPIRATION RATE: 17 BRPM

## 2021-10-29 DIAGNOSIS — L84 CORNS: ICD-10-CM

## 2021-10-29 DIAGNOSIS — M79.671 PAIN IN BOTH FEET: ICD-10-CM

## 2021-10-29 DIAGNOSIS — M79.672 PAIN IN BOTH FEET: ICD-10-CM

## 2021-10-29 DIAGNOSIS — I70.209 PERIPHERAL ARTERIOSCLEROSIS (HCC): Primary | ICD-10-CM

## 2021-10-29 PROCEDURE — 11056 PARNG/CUTG B9 HYPRKR LES 2-4: CPT | Performed by: PODIATRIST

## 2021-11-30 ENCOUNTER — APPOINTMENT (OUTPATIENT)
Dept: LAB | Facility: CLINIC | Age: 86
End: 2021-11-30
Payer: MEDICARE

## 2021-11-30 LAB
ANION GAP SERPL CALCULATED.3IONS-SCNC: 6 MMOL/L (ref 4–13)
BUN SERPL-MCNC: 18 MG/DL (ref 5–25)
CALCIUM SERPL-MCNC: 9 MG/DL (ref 8.3–10.1)
CHLORIDE SERPL-SCNC: 103 MMOL/L (ref 100–108)
CO2 SERPL-SCNC: 27 MMOL/L (ref 21–32)
CREAT SERPL-MCNC: 0.89 MG/DL (ref 0.6–1.3)
GFR SERPL CREATININE-BSD FRML MDRD: 59 ML/MIN/1.73SQ M
GLUCOSE P FAST SERPL-MCNC: 97 MG/DL (ref 65–99)
POTASSIUM SERPL-SCNC: 4 MMOL/L (ref 3.5–5.3)
SODIUM SERPL-SCNC: 136 MMOL/L (ref 136–145)

## 2021-11-30 PROCEDURE — 80048 BASIC METABOLIC PNL TOTAL CA: CPT | Performed by: INTERNAL MEDICINE

## 2021-11-30 PROCEDURE — 36415 COLL VENOUS BLD VENIPUNCTURE: CPT | Performed by: INTERNAL MEDICINE

## 2021-12-03 ENCOUNTER — TELEPHONE (OUTPATIENT)
Dept: CARDIOLOGY CLINIC | Facility: CLINIC | Age: 86
End: 2021-12-03

## 2021-12-05 ENCOUNTER — OFFICE VISIT (OUTPATIENT)
Dept: URGENT CARE | Facility: CLINIC | Age: 86
End: 2021-12-05
Payer: MEDICARE

## 2021-12-05 VITALS
WEIGHT: 125 LBS | SYSTOLIC BLOOD PRESSURE: 172 MMHG | TEMPERATURE: 97.7 F | HEART RATE: 63 BPM | BODY MASS INDEX: 25.25 KG/M2 | RESPIRATION RATE: 14 BRPM | OXYGEN SATURATION: 100 % | DIASTOLIC BLOOD PRESSURE: 80 MMHG

## 2021-12-05 DIAGNOSIS — R05.9 COUGH: ICD-10-CM

## 2021-12-05 DIAGNOSIS — J40 BRONCHITIS: Primary | ICD-10-CM

## 2021-12-05 PROCEDURE — 99213 OFFICE O/P EST LOW 20 MIN: CPT | Performed by: PHYSICIAN ASSISTANT

## 2021-12-05 PROCEDURE — U0005 INFEC AGEN DETEC AMPLI PROBE: HCPCS | Performed by: PHYSICIAN ASSISTANT

## 2021-12-05 PROCEDURE — U0003 INFECTIOUS AGENT DETECTION BY NUCLEIC ACID (DNA OR RNA); SEVERE ACUTE RESPIRATORY SYNDROME CORONAVIRUS 2 (SARS-COV-2) (CORONAVIRUS DISEASE [COVID-19]), AMPLIFIED PROBE TECHNIQUE, MAKING USE OF HIGH THROUGHPUT TECHNOLOGIES AS DESCRIBED BY CMS-2020-01-R: HCPCS | Performed by: PHYSICIAN ASSISTANT

## 2021-12-05 RX ORDER — LEVOFLOXACIN 250 MG/1
250 TABLET ORAL DAILY
Qty: 7 TABLET | Refills: 0 | Status: SHIPPED | OUTPATIENT
Start: 2021-12-05 | End: 2021-12-12

## 2021-12-06 LAB — SARS-COV-2 RNA RESP QL NAA+PROBE: NEGATIVE

## 2022-01-14 ENCOUNTER — OFFICE VISIT (OUTPATIENT)
Dept: PODIATRY | Facility: CLINIC | Age: 87
End: 2022-01-14
Payer: MEDICARE

## 2022-01-14 VITALS — BODY MASS INDEX: 25.2 KG/M2 | WEIGHT: 125 LBS | HEIGHT: 59 IN | RESPIRATION RATE: 16 BRPM

## 2022-01-14 DIAGNOSIS — M79.671 PAIN IN BOTH FEET: ICD-10-CM

## 2022-01-14 DIAGNOSIS — L84 CORNS: ICD-10-CM

## 2022-01-14 DIAGNOSIS — I70.209 PERIPHERAL ARTERIOSCLEROSIS (HCC): Primary | ICD-10-CM

## 2022-01-14 DIAGNOSIS — M79.672 PAIN IN BOTH FEET: ICD-10-CM

## 2022-01-14 PROCEDURE — 11057 PARNG/CUTG B9 HYPRKR LES >4: CPT | Performed by: PODIATRIST

## 2022-01-14 NOTE — PROGRESS NOTES
Assessment/Plan:  Pain   Metatarsalgia   Peripheral artery disease   Bunion formation   Callus formation   6 plantar lesions noted   Mycotic toenail   Clawtoe     Plan   Foot exam performed   Patient educated on conditions   All mycotic nails debrided   Plantar lesions debrided as well       Subjective:  Patient complains of pain in her feet   Patient has pain with ambulation   No history of trauma   She has pain with shoe wear                 Allergies   Allergen Reactions    Cephalexin              Current Outpatient Prescriptions:     metoprolol tartrate (LOPRESSOR) 50 mg tablet, Take 50 mg by mouth 2 (two) times a day, Disp: , Rfl:     amLODIPine (NORVASC) 5 mg tablet, Take 5 mg by mouth daily, Disp: , Rfl:     aspirin 325 mg tablet, Take 325 mg by mouth daily, Disp: , Rfl:     atorvastatin (LIPITOR) 10 mg tablet, Take 10 mg by mouth daily, Disp: , Rfl:     losartan (COZAAR) 50 mg tablet, , Disp: , Rfl:     LUMIGAN 0 01 % ophthalmic drops, , Disp: , Rfl:      There is no problem list on file for this patient             Patient ID: Rea Huizar is a 86 y  o  female      HPI     The following portions of the patient's history were reviewed and updated as appropriate: allergies, current medications, past family history, past medical history, past social history, past surgical history and problem list      Review of Systems       Objective:  Patient's shoes and socks removed    Foot Exam     General  General Appearance: appears stated age and healthy   Orientation: alert and oriented to person, place, and time   Affect: appropriate   Gait: antalgic         Right Foot/Ankle      Inspection and Palpation  Ecchymosis: none  Tenderness: metatarsals   Swelling: metatarsals   Arch: pes planus  Hammertoes: fifth toe   234 toes of the right foot are clawed    Hallux valgus: yes  Hallux limitus: yes  Skin Exam: callus;      Neurovascular  Dorsalis pedis: 1+  Posterior tibial: 1+  Superficial peroneal nerve sensation: diminished  Deep peroneal nerve sensation: diminished  Sural nerve sensation: diminished  Achilles reflex: 2+     Muscle Strength  Ankle dorsiflexion: 4+        Left Foot/Ankle       Inspection and Palpation  Tenderness: metatarsals   Swelling: metatarsals   Arch: pes planus  Hammertoes: fifth toe   Toes 234 are clawed a period  Hallux valgus: yes  Hallux limitus: yes     Neurovascular  Dorsalis pedis: 1+  Superficial peroneal nerve sensation: diminished  Deep peroneal nerve sensation: diminished  Sural nerve sensation: diminished  Achilles reflex: 2+     Muscle Strength  Ankle dorsiflexion: 4+        Physical Exam   Constitutional: She appears well-developed and well-nourished  Cardiovascular: Normal rate and regular rhythm     Pulses:       Dorsalis pedis pulses are 1+ on the right side, and 1+ on the left side         Posterior tibial pulses are 1+ on the right side  Q 9 findings noted bilateral   Negative digital hair noted   Positive abnormal cooling    Feet:   Right Foot:   Skin Integrity: Positive for callus  Neurological:   Reflex Scores:       Achilles reflexes are 2+ on the right side and 2+ on the left side  Skin:   Tylenol submetatarsal 2 noted   This is bilateral     Severe thickened mycotic toenail noted bilateral   Nails are yellow with debris   Positive malodor noted      Distal clavi by 2nd 3rd and 4th toes of the left foot    Nursing note and vitals reviewed

## 2022-01-17 ENCOUNTER — TELEPHONE (OUTPATIENT)
Dept: FAMILY MEDICINE CLINIC | Facility: CLINIC | Age: 87
End: 2022-01-17

## 2022-01-17 DIAGNOSIS — I10 ESSENTIAL HYPERTENSION: Primary | ICD-10-CM

## 2022-01-17 DIAGNOSIS — E78.2 MIXED HYPERLIPIDEMIA: ICD-10-CM

## 2022-02-03 ENCOUNTER — OFFICE VISIT (OUTPATIENT)
Dept: CARDIOLOGY CLINIC | Facility: CLINIC | Age: 87
End: 2022-02-03
Payer: MEDICARE

## 2022-02-03 ENCOUNTER — LAB (OUTPATIENT)
Dept: LAB | Facility: CLINIC | Age: 87
End: 2022-02-03
Payer: MEDICARE

## 2022-02-03 VITALS
OXYGEN SATURATION: 97 % | HEIGHT: 59 IN | SYSTOLIC BLOOD PRESSURE: 142 MMHG | DIASTOLIC BLOOD PRESSURE: 72 MMHG | TEMPERATURE: 98 F | HEART RATE: 56 BPM | BODY MASS INDEX: 24.19 KG/M2 | WEIGHT: 120 LBS

## 2022-02-03 DIAGNOSIS — Z95.2 S/P AORTIC VALVE REPLACEMENT: ICD-10-CM

## 2022-02-03 DIAGNOSIS — I49.5 SINUS BRADY-TACHY SYNDROME (HCC): ICD-10-CM

## 2022-02-03 DIAGNOSIS — R60.0 BILATERAL LEG EDEMA: ICD-10-CM

## 2022-02-03 DIAGNOSIS — E78.2 MIXED HYPERLIPIDEMIA: ICD-10-CM

## 2022-02-03 DIAGNOSIS — I10 ESSENTIAL HYPERTENSION: ICD-10-CM

## 2022-02-03 DIAGNOSIS — I10 ESSENTIAL HYPERTENSION: Primary | ICD-10-CM

## 2022-02-03 DIAGNOSIS — I44.0 HEART BLOCK, FIRST DEGREE: ICD-10-CM

## 2022-02-03 LAB
ALBUMIN SERPL BCP-MCNC: 3.4 G/DL (ref 3.5–5)
ALP SERPL-CCNC: 103 U/L (ref 46–116)
ALT SERPL W P-5'-P-CCNC: 14 U/L (ref 12–78)
ANION GAP SERPL CALCULATED.3IONS-SCNC: 5 MMOL/L (ref 4–13)
AST SERPL W P-5'-P-CCNC: 15 U/L (ref 5–45)
BILIRUB SERPL-MCNC: 0.72 MG/DL (ref 0.2–1)
BUN SERPL-MCNC: 17 MG/DL (ref 5–25)
CALCIUM ALBUM COR SERPL-MCNC: 9.7 MG/DL (ref 8.3–10.1)
CALCIUM SERPL-MCNC: 9.2 MG/DL (ref 8.3–10.1)
CHLORIDE SERPL-SCNC: 106 MMOL/L (ref 100–108)
CHOLEST SERPL-MCNC: 189 MG/DL
CO2 SERPL-SCNC: 28 MMOL/L (ref 21–32)
CREAT SERPL-MCNC: 0.92 MG/DL (ref 0.6–1.3)
GFR SERPL CREATININE-BSD FRML MDRD: 56 ML/MIN/1.73SQ M
GLUCOSE P FAST SERPL-MCNC: 89 MG/DL (ref 65–99)
HDLC SERPL-MCNC: 62 MG/DL
LDLC SERPL CALC-MCNC: 110 MG/DL (ref 0–100)
POTASSIUM SERPL-SCNC: 3.6 MMOL/L (ref 3.5–5.3)
PROT SERPL-MCNC: 8.4 G/DL (ref 6.4–8.2)
SODIUM SERPL-SCNC: 139 MMOL/L (ref 136–145)
TRIGL SERPL-MCNC: 85 MG/DL

## 2022-02-03 PROCEDURE — 80061 LIPID PANEL: CPT

## 2022-02-03 PROCEDURE — 99214 OFFICE O/P EST MOD 30 MIN: CPT | Performed by: INTERNAL MEDICINE

## 2022-02-03 PROCEDURE — 80053 COMPREHEN METABOLIC PANEL: CPT

## 2022-02-03 PROCEDURE — 36415 COLL VENOUS BLD VENIPUNCTURE: CPT

## 2022-02-03 NOTE — PROGRESS NOTES
Cardiology   Nabeel Jett DO, Troy Zaldivar MD, Akila Andrade MD, Pradip Dupree MD, [facility] [place]  -------------------------------------------------------------------  [facility]  [address], [address] [facility] [place] [address], [facility] [address]  [phone]    Cardiology Follow Up  Brianna Vasquez  1935  [id number]          Assessment/Plan:    1  Aortic valve disease with prior AVR  - recent echocardiogram showed normal valve function  Normal valve gradients without any regurgitation  2  Essential hypertension  -    Home blood pressure log reviewed  Improved from previous but still elevated  She did not develop edema with amlodipine (losartan was increased)  -   Will continue Toprol XL 25 mg daily   Along with losartan 100 mg daily  - Avoid HCTZ because of hyponatremia  -  Blood work done today  Results pending  Will review prior to any medication change  - continue home blood pressure monitoring  3  Heart block, first degree  - heart rate has remained stable between 55 and 60 beats per minute  4  Sinus luis angel-tachy syndrome (HCC)  - heart rate normal today  5  Peripheral arteriosclerosis ([address] )  - continue aspirin    6  Bilateral leg edema  - resolved with discontinuation of higher dose of amlodipine  Has been restarted along with increased ARB dose  No edema present today  7  Mixed hyperlipidemia  - last LDL cholesterol was less than 70 mg/dL  Statin was discontinued  Interval History:     Brianna Vasquez is 80 y o  female here for followup of hypertension  Previously, felodipine 5 mg daily was added because of elevated blood pressure  She has not had any LE edema but feels it has caused constipation  She was switched back to amlodipine 5 mg daily  She is here today for follow up of hypertension  She denies any chest pain or shortness of breath  Home BP log reviewed    BP ranges from 130-170/60-90 mmHg  In November 2020, blood pressure was markedly elevated during that visit and hydrochlorothiazide 25 mg daily was added to her medication regimen  Previously, she developed lower extremity edema which improved with amlodipine discontinuation  Since then, blood pressure has been elevated  She was started on Toprol XL 25 mg daily along with losartan 50 mg daily  Losartan was increased to 100 mg daily and Hydrochlorothiazide was stopped after developing hyponatremia  The patient has a history of aortic valve replacement  In June 2015, she had aortic valve [facility] at [facility] for aortic valve stenosis  She has no history of coronary artery disease          Past Medical History:   Diagnosis Date    Aortic valve disease     GERD (gastroesophageal reflux disease)     Glaucoma     Heart block, first degree     Heart murmur     Hyperlipidemia     Hypertension     Macular degeneration     Sinus luis angel-tachy syndrome (HCC)     SSS (sick sinus syndrome) (HCC)      Social History     Socioeconomic History    Marital status: /Civil Union     Spouse name: Not on file    Number of children: Not on file    Years of education: Not on file    Highest education level: Not on file   Occupational History    Not on file   Tobacco Use    Smoking status: Never Smoker    Smokeless tobacco: Never Used   Vaping Use    Vaping Use: Never used   Substance and Sexual Activity    Alcohol use: Not Currently    Drug use: Never    Sexual activity: Not on file   Other Topics Concern    Not on file   Social History Narrative    Not on file     Social Determinants of Health     Financial Resource Strain: Not on file   Food Insecurity: Not on file   Transportation Needs: Not on file   Physical Activity: Not on file   Stress: Not on file   Social Connections: Not on file   Intimate Partner Violence: Not on file   Housing Stability: Not on file      Family History Problem Relation Age of Onset    Heart disease Mother     Hypertension Mother     Diabetes Mother     Heart disease Father     Heart attack Father          at 54    Mental illness Neg Hx      Past Surgical History:   Procedure Laterality Date    AORTIC VALVE REPLACEMENT  2015    Porcine valve    COLONOSCOPY      CO XCAPSL CTRC RMVL INSJ IO LENS PROSTH W/O ECP Right 2021    Procedure: EXTRACTION EXTRACAPSULAR CATARACT PHACO INTRAOCULAR LENS (IOL);   Surgeon: Charleen Abernathy MD;  Location: [facility] MAIN OR;  Service: Ophthalmology       Current Outpatient Medications:     acetaminophen (TYLENOL) 325 mg tablet, Take 3 tablets (975 mg total) by mouth every 8 (eight) hours, Disp: 30 tablet, Rfl: 0    amLODIPine (NORVASC) 5 mg tablet, Take 1 tablet (5 mg total) by mouth daily, Disp: 30 tablet, Rfl: 5    Ascorbic Acid (vitamin C) 100 MG tablet, Take 100 mg by mouth daily, Disp: , Rfl:     aspirin (ECOTRIN LOW STRENGTH) 81 mg EC tablet, Take 1 tablet (81 mg total) by mouth daily, Disp:  , Rfl: 0    Biotin 1 MG CAPS, Take by mouth, Disp: , Rfl:     cholecalciferol (VITAMIN D3) 1,000 units tablet, Take 2,000 Units by mouth daily, Disp: , Rfl:     losartan (COZAAR) 100 MG tablet, Take 1 tablet (100 mg total) by mouth daily, Disp: 90 tablet, Rfl: 3    LUMIGAN 0 01 % ophthalmic drops, Administer 1 drop to both eyes daily at bedtime , Disp: , Rfl:     metoprolol succinate (TOPROL-XL) 25 mg 24 hr tablet, Take 1 tablet (25 mg total) by mouth daily, Disp: 90 tablet, Rfl: 3    Multiple Vitamins-Minerals (PRESERVISION AREDS PO), Take by mouth, Disp: , Rfl:     timolol (TIMOPTIC-XE) 0 5 % ophthalmic gel-forming, Administer to both eyes daily , Disp: , Rfl:     ciprofloxacin (CILOXAN) 0 3 % ophthalmic solution, Administer 1 drop to the right eye 4 (four) times a day (Patient not taking: Reported on 10/22/2021), Disp: 5 mL, Rfl: 0    famotidine (PEPCID) 20 mg tablet, Take 20 mg by mouth daily  (Patient not taking: Reported on 10/22/2021), Disp: , Rfl:     ketorolac (ACULAR) 0 5 % ophthalmic solution, Administer 1 drop to the right eye 4 (four) times a day (Patient not taking: Reported on 10/22/2021), Disp: 5 mL, Rfl: 0        Review of Systems:  Review of Systems   Respiratory: Negative for shortness of breath  Cardiovascular: Negative for chest pain, palpitations and leg swelling  Musculoskeletal: Positive for arthralgias  All other systems reviewed and are negative  Physical Exam:  Vitals:  Vitals:    02/03/22 0833   BP: 142/72   BP Location: Left arm   Patient Position: Sitting   Cuff Size: Standard   Pulse: 56   Temp: 98 °F (36 7 °C)   TempSrc: Temporal   SpO2: 97%   Weight: 54 4 kg (120 lb)   Height: 4' 11" (1 499 m)     Physical Exam   Constitutional: She appears healthy  No distress  Eyes: Pupils are equal, round, and reactive to light  Conjunctivae are normal    Neck: No JVD present  Cardiovascular: Normal rate and regular rhythm  Exam reveals no gallop and no friction rub  Murmur heard  Pulmonary/Chest: Effort normal and breath sounds normal  She has no wheezes  She has no rales  Musculoskeletal:         General: No tenderness, deformity or edema  Cervical back: Normal range of motion and neck supple  Neurological: She is alert and oriented to person, place, and time  Skin: Skin is warm and dry  Cardiographics:  Last known EF:  55-60% with normal bioprosthetic aortic valve  This note was completed in part utilizing M-Modal Fluency Direct Software  Grammatical errors, random word insertions, spelling mistakes, and incomplete sentences can be an occasional consequence of this system secondary to software limitations, ambient noise, and hardware issues  If you have any questions or concerns about the content, text, or information contained within the body of this dictation, please contact the provider for clarification

## 2022-02-07 ENCOUNTER — TELEPHONE (OUTPATIENT)
Dept: CARDIOLOGY CLINIC | Facility: CLINIC | Age: 87
End: 2022-02-07

## 2022-02-07 DIAGNOSIS — I10 ESSENTIAL HYPERTENSION: ICD-10-CM

## 2022-02-07 NOTE — TELEPHONE ENCOUNTER
Pt daughter called states you were going to review labs ordered by pcp to adjust medications  pls advise, thank you

## 2022-02-08 RX ORDER — AMLODIPINE BESYLATE 2.5 MG/1
7 TABLET ORAL DAILY
Qty: 90 TABLET | Refills: 5 | Status: CANCELLED | OUTPATIENT
Start: 2022-02-08

## 2022-02-11 ENCOUNTER — OFFICE VISIT (OUTPATIENT)
Dept: FAMILY MEDICINE CLINIC | Facility: CLINIC | Age: 87
End: 2022-02-11
Payer: MEDICARE

## 2022-02-11 VITALS
DIASTOLIC BLOOD PRESSURE: 70 MMHG | BODY MASS INDEX: 24.39 KG/M2 | SYSTOLIC BLOOD PRESSURE: 130 MMHG | RESPIRATION RATE: 16 BRPM | WEIGHT: 121 LBS | HEART RATE: 72 BPM | TEMPERATURE: 97.7 F | HEIGHT: 59 IN

## 2022-02-11 DIAGNOSIS — Z00.00 MEDICARE ANNUAL WELLNESS VISIT, SUBSEQUENT: Primary | ICD-10-CM

## 2022-02-11 DIAGNOSIS — N18.30 STAGE 3 CHRONIC KIDNEY DISEASE, UNSPECIFIED WHETHER STAGE 3A OR 3B CKD (HCC): ICD-10-CM

## 2022-02-11 DIAGNOSIS — L98.9 SKIN LESION OF RIGHT LEG: ICD-10-CM

## 2022-02-11 DIAGNOSIS — H40.9 GLAUCOMA OF BOTH EYES, UNSPECIFIED GLAUCOMA TYPE: ICD-10-CM

## 2022-02-11 DIAGNOSIS — Q04.6 CONGENITAL CEREBRAL CYSTS (HCC): ICD-10-CM

## 2022-02-11 DIAGNOSIS — E78.2 MIXED HYPERLIPIDEMIA: ICD-10-CM

## 2022-02-11 DIAGNOSIS — Z78.0 POST-MENOPAUSAL: ICD-10-CM

## 2022-02-11 DIAGNOSIS — I49.5 SINUS BRADY-TACHY SYNDROME (HCC): ICD-10-CM

## 2022-02-11 DIAGNOSIS — S06.360A: ICD-10-CM

## 2022-02-11 DIAGNOSIS — I10 ESSENTIAL HYPERTENSION: ICD-10-CM
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
## 2022-02-11 PROCEDURE — 1123F ACP DISCUSS/DSCN MKR DOCD: CPT | Performed by: FAMILY MEDICINE

## 2022-02-11 PROCEDURE — G0439 PPPS, SUBSEQ VISIT: HCPCS | Performed by: FAMILY MEDICINE

## 2022-02-11 RX ORDER — ROSUVASTATIN CALCIUM 5 MG/1
5 TABLET, COATED ORAL DAILY
Qty: 90 TABLET | Refills: 3 | Status: SHIPPED | OUTPATIENT
Start: 2022-02-11 | End: 2022-05-27 | Stop reason: SDUPTHER

## 2022-02-11 NOTE — ASSESSMENT & PLAN NOTE
Lab Results   Component Value Date    EGFR 56 02/03/2022    EGFR 59 11/30/2021    EGFR 59 05/26/2021    CREATININE 0 92 02/03/2022    CREATININE 0 89 11/30/2021    CREATININE 0 89 05/26/2021   Current kidney function is acceptable

## 2022-02-11 NOTE — PROGRESS NOTES
Assessment and Plan:     Problem List Items Addressed This Visit        Cardiovascular and Mediastinum    Sinus luis angel-tachy syndrome ([facility] [address] )    Essential hypertension     Stable today            Nervous and Auditory    Traumatic hemorrhage of cerebrum, unspecified, without loss of consciousness, initial encounter [facility])       Genitourinary    Stage 3 chronic kidney disease, unspecified whether stage 3a or 3b CKD ([facility] [address] )     Lab Results   Component Value Date    EGFR 56 02/03/2022    EGFR 59 11/30/2021    EGFR 59 05/26/2021    CREATININE 0 92 02/03/2022    CREATININE 0 89 11/30/2021    CREATININE 0 89 05/26/2021   Current kidney function is acceptable            Other    Mixed hyperlipidemia    Relevant Medications    rosuvastatin (CRESTOR) 5 mg tablet    Other Relevant Orders    Comprehensive metabolic panel    Lipid Panel with Direct LDL reflex    Glaucoma of both eyes      Other Visit Diagnoses     Medicare annual wellness visit, subsequent    -  Primary    Skin lesion of right leg        Relevant Orders    Ambulatory referral to Dermatology    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[facility] )        Post-menopausal        Relevant Orders    DXA bone density spine hip and pelvis           Preventive health issues were discussed with patient, and age appropriate screening tests were ordered as noted in patient's After Visit Summary  Personalized health advice and appropriate referrals for health education or preventive services given if needed, as noted in patient's After Visit Summary       History of Present Illness:     Patient presents for Medicare Annual Wellness visit    Patient Care Team:  Arnol Angulo DO as PCP - General (Family Medicine)  Portia Baker MD as Consulting Physician (Ophthalmology)  Antonio Noriega DPM as Consulting Physician (Podiatry)  DO Bernardo as Consulting Physician (Cardiology)     Problem List:     Patient Active Problem List   Diagnosis    Metatarsalgia of both feet    Acquired deformity of foot  Valgus deformity of both great toes    Pain in both feet    Peripheral arteriosclerosis (HCC)    Corns    Sinus luis angel-tachy syndrome (HCC)    Essential hypertension    Mixed hyperlipidemia    Heart block, first degree    Aortic valve disease    Bilateral leg edema    S/P aortic valve replacement    Glaucoma of both eyes    Macular degeneration of both eyes    Hearing decreased    Colloid cyst of brain ([facility] [address] )    Traumatic hemorrhage of cerebrum, unspecified, without loss of consciousness, initial encounter ([facility] [address] )    Stage 3 chronic kidney disease, unspecified whether stage 3a or 3b CKD ([facility] [address] )      Past Medical and Surgical History:     Past Medical History:   Diagnosis Date    Aortic valve disease     GERD (gastroesophageal reflux disease)     Glaucoma     Heart block, first degree     Heart murmur     Hyperlipidemia     Hypertension     Macular degeneration     Sinus luis angel-tachy syndrome (HCC)     SSS (sick sinus syndrome) ([facility] [address] )      Past Surgical History:   Procedure Laterality Date    AORTIC VALVE REPLACEMENT  2015    Porcine valve    COLONOSCOPY      GA XCAPSL CTRC RMVL INSJ IO LENS PROSTH W/O ECP Right 2021    Procedure: EXTRACTION EXTRACAPSULAR CATARACT PHACO INTRAOCULAR LENS (IOL);   Surgeon: Michelle Levine MD;  Location: [facility] MAIN OR;  Service: Ophthalmology      Family History:     Family History   Problem Relation Age of Onset    Heart disease Mother     Hypertension Mother     Diabetes Mother     Heart disease Father     Heart attack Father          at 54    Mental illness Neg Hx       Social History:     Social History     Socioeconomic History    Marital status: /Civil Union     Spouse name: None    Number of children: None    Years of education: None    Highest education level: None   Occupational History    None   Tobacco Use    Smoking status: Never Smoker    Smokeless tobacco: Never Used   Vaping Use    Vaping Use: Never used   Substance and Sexual Activity    Alcohol use: Not Currently    Drug use: Never    Sexual activity: None   Other Topics Concern    None   Social History Narrative    None     Social Determinants of Health     Financial Resource Strain: Not on file   Food Insecurity: Not on file   Transportation Needs: Not on file   Physical Activity: Not on file   Stress: Not on file   Social Connections: Not on file   Intimate Partner Violence: Not on file   Housing Stability: Not on file      Medications and Allergies:     Current Outpatient Medications   Medication Sig Dispense Refill    acetaminophen (TYLENOL) 325 mg tablet Take 3 tablets (975 mg total) by mouth every 8 (eight) hours 30 tablet 0    amLODIPine (NORVASC) 2 5 mg tablet Take 1 tablet (2 5 mg total) by mouth daily 30 tablet 3    amLODIPine (NORVASC) 5 mg tablet Take 1 tablet (5 mg total) by mouth daily 30 tablet 3    Ascorbic Acid (vitamin C) 100 MG tablet Take 100 mg by mouth daily      aspirin (ECOTRIN LOW STRENGTH) 81 mg EC tablet Take 1 tablet (81 mg total) by mouth daily  0    Biotin 1 MG CAPS Take by mouth      cholecalciferol (VITAMIN D3) 1,000 units tablet Take 2,000 Units by mouth daily      losartan (COZAAR) 100 MG tablet Take 1 tablet (100 mg total) by mouth daily 90 tablet 3    LUMIGAN 0 01 % ophthalmic drops Administer 1 drop to both eyes daily at bedtime       metoprolol succinate (TOPROL-XL) 25 mg 24 hr tablet Take 1 tablet (25 mg total) by mouth daily 90 tablet 3    Multiple Vitamins-Minerals (PRESERVISION AREDS PO) Take by mouth      timolol (TIMOPTIC-XE) 0 5 % ophthalmic gel-forming Administer to both eyes daily       famotidine (PEPCID) 20 mg tablet Take 20 mg by mouth daily  (Patient not taking: Reported on 10/22/2021)      rosuvastatin (CRESTOR) 5 mg tablet Take 1 tablet (5 mg total) by mouth daily 90 tablet 3     No current facility-administered medications for this visit       Allergies   Allergen Reactions    Cephalexin Rash Immunizations:     Immunization History   Administered Date(s) Administered    INFLUENZA 09/27/2016, 09/13/2017, 09/17/2018    Influenza, high dose seasonal 0 7 mL 10/07/2019, 11/05/2020    Pneumococcal Conjugate 13-Valent 10/07/2019    Pneumococcal Polysaccharide PPV23 11/02/2013    Tdap 12/08/2020      Health Maintenance: There are no preventive care reminders to display for this patient  There are no preventive care reminders to display for this patient  Medicare Health Risk Assessment:     /70   Pulse 72   Temp 97 7 °F (36 5 °C)   Resp 16   Ht 4' 11" (1 499 m)   Wt 54 9 kg (121 lb)   BMI 24 44 kg/m²      Reggie Reyna is here for her Subsequent Wellness visit  Last Medicare Wellness visit information reviewed, patient interviewed, no change since last AWV  Health Risk Assessment:   Patient rates overall health as very good  Patient feels that their physical health rating is same  Patient is satisfied with their life  Eyesight was rated as same  Hearing was rated as slightly worse  Patient feels that their emotional and mental health rating is same  Patients states they are never, rarely angry  Patient states they are never, rarely unusually tired/fatigued  Pain experienced in the last 7 days has been none  Patient states that she has experienced no weight loss or gain in last 6 months  Depression Screening:   PHQ-2 Score: 0      Fall Risk Screening: In the past year, patient has experienced: no history of falling in past year      Urinary Incontinence Screening:   Patient has leaked urine accidently in the last six months  Home Safety:  Patient has trouble with stairs inside or outside of their home  Patient has working smoke alarms and has working carbon monoxide detector  Home safety hazards include: none  Nutrition:   Current diet is No Added Salt  Medications:   Patient is currently taking over-the-counter supplements   OTC medications include: see medication list  Patient is able to manage medications  Activities of Daily Living (ADLs)/Instrumental Activities of Daily Living (IADLs):   Walk and transfer into and out of bed and chair?: Yes  Dress and groom yourself?: Yes    Bathe or shower yourself?: Yes    Feed yourself? Yes  Do your laundry/housekeeping?: No  Manage your money, pay your bills and track your expenses?: Yes  Make your own meals?: No    Do your own shopping?: No    Previous Hospitalizations:   Any hospitalizations or ED visits within the last 12 months?: No      Advance Care Planning:   Living will: Yes    Durable POA for healthcare:  Yes    Advanced directive: Yes      Cognitive Screening:   Provider or family/friend/caregiver concerned regarding cognition?: No    PREVENTIVE SCREENINGS      Cardiovascular Screening:    General: Screening Not Indicated, History Lipid Disorder, Risks and Benefits Discussed and Screening Current    Due for: Lipid Panel      Diabetes Screening:     General: Screening Current and Risks and Benefits Discussed    Due for: Blood Glucose      Colorectal Cancer Screening:     General: Screening Not Indicated      Breast Cancer Screening:     General: Screening Not Indicated and Risks and Benefits Discussed      Cervical Cancer Screening:    General: Screening Not Indicated      Osteoporosis Screening:    General: Risks and Benefits Discussed    Due for: Bone Density Ultrasound      Abdominal Aortic Aneurysm (AAA) Screening:        General: Screening Not Indicated      Lung Cancer Screening:     General: Screening Not Indicated      Hepatitis C Screening:    General: Risks and Benefits Discussed and Patient Declines    Screening, Brief Intervention, and Referral to Treatment (SBIRT)    Screening      AUDIT-C Screenin) How often did you have a drink containing alcohol in the past year? never  2) How many drinks did you have on a typical day when you were drinking in the past year? 0  3) How often did you have 6 or more drinks on one occasion in the past year? never    AUDIT-C Score: 0  Interpretation: Score 0-2 (female): Negative screen for alcohol misuse    Single Item Drug Screening:  How often have you used an illegal drug (including marijuana) or a prescription medication for non-medical reasons in the past year? never    Single Item Drug Screen Score: 0  Interpretation: Negative screen for possible drug use disorder    Brief Intervention  Alcohol & drug use screenings were reviewed  No concerns regarding substance use disorder identified       Recent Results (from the past 672 hour(s))   Comprehensive metabolic panel    Collection Time: 02/03/22  8:06 AM   Result Value Ref Range    Sodium 139 136 - 145 mmol/L    Potassium 3 6 3 5 - 5 3 mmol/L    Chloride 106 100 - 108 mmol/L    CO2 28 21 - 32 mmol/L    ANION GAP 5 4 - 13 mmol/L    BUN 17 5 - 25 mg/dL    Creatinine 0 92 0 60 - 1 30 mg/dL    Glucose, Fasting 89 65 - 99 mg/dL    Calcium 9 2 8 3 - 10 1 mg/dL    Corrected Calcium 9 7 8 3 - 10 1 mg/dL    AST 15 5 - 45 U/L    ALT 14 12 - 78 U/L    Alkaline Phosphatase 103 46 - 116 U/L    Total Protein 8 4 (H) 6 4 - 8 2 g/dL    Albumin 3 4 (L) 3 5 - 5 0 g/dL    Total Bilirubin 0 72 0 20 - 1 00 mg/dL    eGFR 56 ml/min/1 73sq m   Lipid Panel with Direct LDL reflex    Collection Time: 02/03/22  8:06 AM   Result Value Ref Range    Cholesterol 189 See Comment mg/dL    Triglycerides 85 See Comment mg/dL    HDL, Direct 62 >=50 mg/dL    LDL Calculated 110 (H) 0 - 100 mg/dL       Chery Cazares DO

## 2022-02-11 NOTE — PATIENT INSTRUCTIONS
Medicare Preventive Visit Patient Instructions  Thank you for completing your Welcome to Medicare Visit or Medicare Annual Wellness Visit today  Your next wellness visit will be due in one year (2/12/2023)  The screening/preventive services that you may require over the next 5-10 years are detailed below  Some tests may not apply to you based off risk factors and/or age  Screening tests ordered at today's visit but not completed yet may show as past due  Also, please note that scanned in results may not display below  Preventive Screenings:  Service Recommendations Previous Testing/Comments   Colorectal Cancer Screening  * Colonoscopy    * Fecal Occult Blood Test (FOBT)/Fecal Immunochemical Test (FIT)  * Fecal DNA/Cologuard Test  * Flexible Sigmoidoscopy Age: 54-65 years old   Colonoscopy: every 10 years (may be performed more frequently if at higher risk)  OR  FOBT/FIT: every 1 year  OR  Cologuard: every 3 years  OR  Sigmoidoscopy: every 5 years  Screening may be recommended earlier than age 48 if at higher risk for colorectal cancer  Also, an individualized decision between you and your healthcare provider will decide whether screening between the ages of 74-80 would be appropriate  Colonoscopy: Not on file  FOBT/FIT: Not on file  Cologuard: Not on file  Sigmoidoscopy: Not on file    Screening Not Indicated     Breast Cancer Screening Age: 36 years old  Frequency: every 1-2 years  Not required if history of left and right mastectomy Mammogram: Not on file        Cervical Cancer Screening Between the ages of 21-29, pap smear recommended once every 3 years  Between the ages of 33-67, can perform pap smear with HPV co-testing every 5 years     Recommendations may differ for women with a history of total hysterectomy, cervical cancer, or abnormal pap smears in past  Pap Smear: Not on file    Screening Not Indicated   Hepatitis C Screening Once for adults born between 1945 and 1965  More frequently in patients at high risk for Hepatitis C Hep C Antibody: Not on file        Diabetes Screening 1-2 times per year if you're at risk for diabetes or have pre-diabetes Fasting glucose: 89 mg/dL   A1C: No results in last 5 years    Screening Current   Cholesterol Screening Once every 5 years if you don't have a lipid disorder  May order more often based on risk factors  Lipid panel: 02/03/2022    Screening Not Indicated  History Lipid Disorder     Other Preventive Screenings Covered by Medicare:  1  Abdominal Aortic Aneurysm (AAA) Screening: covered once if your at risk  You're considered to be at risk if you have a family history of AAA  2  Lung Cancer Screening: covers low dose CT scan once per year if you meet all of the following conditions: (1) Age 50-69; (2) No signs or symptoms of lung cancer; (3) Current smoker or have quit smoking within the last 15 years; (4) You have a tobacco smoking history of at least 30 pack years (packs per day multiplied by number of years you smoked); (5) You get a written order from a healthcare provider  3  Glaucoma Screening: covered annually if you're considered high risk: (1) You have diabetes OR (2) Family history of glaucoma OR (3)  aged 48 and older OR (3)  American aged 72 and older  3  Osteoporosis Screening: covered every 2 years if you meet one of the following conditions: (1) You're estrogen deficient and at risk for osteoporosis based off medical history and other findings; (2) Have a vertebral abnormality; (3) On glucocorticoid therapy for more than 3 months; (4) Have primary hyperparathyroidism; (5) On osteoporosis medications and need to assess response to drug therapy  · Last bone density test (DXA Scan): Not on file  5  HIV Screening: covered annually if you're between the age of 12-76  Also covered annually if you are younger than 13 and older than 72 with risk factors for HIV infection   For pregnant patients, it is covered up to 3 times per pregnancy  Immunizations:  Immunization Recommendations   Influenza Vaccine Annual influenza vaccination during flu season is recommended for all persons aged >= 6 months who do not have contraindications   Pneumococcal Vaccine (Prevnar and Pneumovax)  * Prevnar = PCV13  * Pneumovax = PPSV23   Adults 25-60 years old: 1-3 doses may be recommended based on certain risk factors  Adults 72 years old: Prevnar (PCV13) vaccine recommended followed by Pneumovax (PPSV23) vaccine  If already received PPSV23 since turning 65, then PCV13 recommended at least one year after PPSV23 dose  Hepatitis B Vaccine 3 dose series if at intermediate or high risk (ex: diabetes, end stage renal disease, liver disease)   Tetanus (Td) Vaccine - COST NOT COVERED BY MEDICARE PART B Following completion of primary series, a booster dose should be given every 10 years to maintain immunity against tetanus  Td may also be given as tetanus wound prophylaxis  Tdap Vaccine - COST NOT COVERED BY MEDICARE PART B Recommended at least once for all adults  For pregnant patients, recommended with each pregnancy  Shingles Vaccine (Shingrix) - COST NOT COVERED BY MEDICARE PART B  2 shot series recommended in those aged 48 and above     Health Maintenance Due:  There are no preventive care reminders to display for this patient  Immunizations Due:      Topic Date Due    COVID-19 Vaccine (1) Never done    Influenza Vaccine (1) 09/01/2021     Advance Directives   What are advance directives? Advance directives are legal documents that state your wishes and plans for medical care  These plans are made ahead of time in case you lose your ability to make decisions for yourself  Advance directives can apply to any medical decision, such as the treatments you want, and if you want to donate organs  What are the types of advance directives? There are many types of advance directives, and each state has rules about how to use them   You may choose a combination of any of the following:  · Living will: This is a written record of the treatment you want  You can also choose which treatments you do not want, which to limit, and which to stop at a certain time  This includes surgery, medicine, IV fluid, and tube feedings  · Durable power of  for healthcare [facility]): This is a written record that states who you want to make healthcare choices for you when you are unable to make them for yourself  This person, called a proxy, is usually a family member or a friend  You may choose more than 1 proxy  · Do not resuscitate (DNR) order:  A DNR order is used in case your heart stops beating or you stop breathing  It is a request not to have certain forms of treatment, such as CPR  A DNR order may be included in other types of advance directives  · Medical directive: This covers the care that you want if you are in a coma, near death, or unable to make decisions for yourself  You can list the treatments you want for each condition  Treatment may include pain medicine, surgery, blood transfusions, dialysis, IV or tube feedings, and a ventilator (breathing machine)  · Values history: This document has questions about your views, beliefs, and how you feel and think about life  This information can help others choose the care that you would choose  Why are advance directives important? An advance directive helps you control your care  Although spoken wishes may be used, it is better to have your wishes written down  Spoken wishes can be misunderstood, or not followed  Treatments may be given even if you do not want them  An advance directive may make it easier for your family to make difficult choices about your care  Urinary Incontinence   Urinary incontinence (UI)  is when you lose control of your bladder  UI develops because your bladder cannot store or empty urine properly   The 3 most common types of UI are stress incontinence, urge incontinence, or both   Medicines:   · May be given to help strengthen your bladder control  Report any side effects of medication to your healthcare provider  Do pelvic muscle exercises often:  Your pelvic muscles help you stop urinating  Squeeze these muscles tight for 5 seconds, then relax for 5 seconds  Gradually work up to squeezing for 10 seconds  Do 3 sets of 15 repetitions a day, or as directed  This will help strengthen your pelvic muscles and improve bladder control  Train your bladder:  Go to the bathroom at set times, such as every 2 hours, even if you do not feel the urge to go  You can also try to hold your urine when you feel the urge to go  For example, hold your urine for 5 minutes when you feel the urge to go  As that becomes easier, hold your urine for 10 minutes  Self-care:   · Keep a UI record  Write down how often you leak urine and how much you leak  Make a note of what you were doing when you leaked urine  · Drink liquids as directed  You may need to limit the amount of liquid you drink to help control your urine leakage  Do not drink any liquid right before you go to bed  Limit or do not have drinks that contain caffeine or alcohol  · Prevent constipation  Eat a variety of high-fiber foods  Good examples are high-fiber cereals, beans, vegetables, and whole-grain breads  Walking is the best way to trigger your intestines to have a bowel movement  · Exercise regularly and maintain a healthy weight  Weight loss and exercise will decrease pressure on your bladder and help you control your leakage  · Use a catheter as directed  to help empty your bladder  A catheter is a tiny, plastic tube that is put into your bladder to drain your urine  · Go to behavior therapy as directed  Behavior therapy may be used to help you learn to control your urge to urinate         © Copyright [address] 2018 Information is for End User's use only and may not be sold, redistributed or otherwise used for commercial purposes   All illustrations and images included in CareNotes® are the copyrighted property of A D A M , Inc  or [facility] More[facility]

## 2022-03-23 ENCOUNTER — OFFICE VISIT (OUTPATIENT)
Dept: PODIATRY | Facility: CLINIC | Age: 87
End: 2022-03-23
Payer: MEDICARE

## 2022-03-23 VITALS — RESPIRATION RATE: 17 BRPM | WEIGHT: 121 LBS | BODY MASS INDEX: 24.39 KG/M2 | HEIGHT: 59 IN

## 2022-03-23 DIAGNOSIS — L84 CORNS: ICD-10-CM

## 2022-03-23 DIAGNOSIS — I70.209 PERIPHERAL ARTERIOSCLEROSIS (HCC): Primary | ICD-10-CM

## 2022-03-23 DIAGNOSIS — M79.672 PAIN IN BOTH FEET: ICD-10-CM

## 2022-03-23 DIAGNOSIS — M79.671 PAIN IN BOTH FEET: ICD-10-CM

## 2022-03-23 PROCEDURE — 11056 PARNG/CUTG B9 HYPRKR LES 2-4: CPT | Performed by: PODIATRIST

## 2022-04-04 RX ORDER — ASCORBIC ACID 500 MG
500 TABLET ORAL
COMMUNITY

## 2022-04-04 RX ORDER — KETOROLAC TROMETHAMINE 5 MG/ML
1 SOLUTION OPHTHALMIC
Status: ON HOLD | COMMUNITY
Start: 2022-03-23 | End: 2022-04-11 | Stop reason: SDUPTHER

## 2022-04-04 RX ORDER — CIPROFLOXACIN HYDROCHLORIDE 3.5 MG/ML
1 SOLUTION/ DROPS TOPICAL
Status: ON HOLD | COMMUNITY
Start: 2022-03-23 | End: 2022-04-11 | Stop reason: SDUPTHER

## 2022-04-04 RX ORDER — PREDNISOLONE ACETATE 10 MG/ML
SUSPENSION/ DROPS OPHTHALMIC 4 TIMES DAILY
COMMUNITY
Start: 2022-03-23 | End: 2022-04-11 | Stop reason: HOSPADM

## 2022-04-04 NOTE — PRE-PROCEDURE INSTRUCTIONS
Pre-Surgery Instructions:   Medication Instructions    acetaminophen (TYLENOL) 325 mg tablet Instructed patient per Anesthesia Guidelines   amLODIPine (NORVASC) 2 5 mg tablet Take DOS    amLODIPine (NORVASC) 5 mg tablet Take DOS    ascorbic acid (VITAMIN C) 500 mg tablet Instructed patient per Anesthesia Guidelines   aspirin (ECOTRIN LOW STRENGTH) 81 mg EC tablet Instructed patient per Anesthesia Guidelines   Biotin 1 MG CAPS Instructed patient per Anesthesia Guidelines   cholecalciferol (VITAMIN D3) 1,000 units tablet Instructed patient per Anesthesia Guidelines   ciprofloxacin (CILOXAN) 0 3 % ophthalmic solution Patient was instructed by Physician and understands   ketorolac (ACULAR) 0 5 % ophthalmic solution Patient was instructed by Physician and understands   losartan (COZAAR) 100 MG tablet Instructed patient per Anesthesia Guidelines   LUMIGAN 0 01 % ophthalmic drops Instructed patient per Anesthesia Guidelines   metoprolol succinate (TOPROL-XL) 25 mg 24 hr tablet Take DOS    Multiple Vitamins-Minerals (PRESERVISION AREDS PO) Instructed patient per Anesthesia Guidelines   prednisoLONE acetate (PRED FORTE) 1 % ophthalmic suspension Patient was instructed by Physician and understands   rosuvastatin (CRESTOR) 5 mg tablet Instructed patient per Anesthesia Guidelines   timolol (TIMOPTIC-XE) 0 5 % ophthalmic gel-forming Instructed patient per Anesthesia Guidelines  Pre op instructions given  Pt to follow Dr Lupe Verdin instructions  Pt to take Toprol XL, amlodipine the am of the surgery, at least 2 hours prior to arrival time with up to 6 oz of water   Roney Casanova all valuables at home   daughter Fede Pollard [phone] Surgical Experience    The following information was developed to assist you to prepare for your operation      What do I need to do before coming to the hospital?   Arrange for a responsible person to drive you to and from the hospital    Arrange care for your children at home  Children are not allowed in the recovery areas of the hospital   Plan to wear clothing that is easy to put on and take off  If you are having shoulder surgery, wear a shirt that buttons or zippers in the front  Bathing  o Shower the evening before and the morning of your surgery with an antibacterial soap  Please refer to the Pre Op Showering Instructions for Surgery Patients Sheet   o Remove nail polish and all body piercing jewelry  o Do not shave any body part for at least 24 hours before surgery-this includes face, arms, legs and upper body  Food  o Nothing to eat or drink after midnight the night before your surgery  This includes candy and chewing gum  o Exception: If your surgery is after 12:00pm (noon), you may have clear liquids such as 7-Up®, ginger ale, apple or cranberry juice, Jell-O®, water, or clear broth until 8:00 am  o Do not drink milk or juice with pulp on the morning before surgery  o Do not drink alcohol 24 hours before surgery  Medicine  o Follow instructions you received from your surgeon about which medicines you may take on the day of surgery  o If instructed to take medicine on the morning of surgery, take pills with just a small sip of water  Call your prescribing doctor for specific infroamtion on what to do if you take insulin    What should I bring to the hospital?    Bring:  Deven Arthur or a walker, if you have them, for foot or knee surgery   A list of the daily medicines, vitamins, minerals, herbals and nutritional supplements you take   Include the dosages of medicines and the time you take them each day   Glasses, dentures or hearing aids   Minimal clothing; you will be wearing hospital sleepwear   Photo ID; required to verify your identity   If you have a Living Will or Power of , bring a copy of the documents   If you have an ostomy, bring an extra pouch and any supplies you use    Do not bring   Medicines or inhalers   Money, valuables or salvador    What other information should I know about the day of surgery?  Notify your surgeons if you develop a cold, sore throat, cough, fever, rash or any other illness   Report to the Ambulatory Surgical/Same Day Surgery Unit   You will be instructed to stop at Registration only if you have not been pre-registered   Inform your  fi they do not stay that they will be asked by the staff to leave a phone number where they can be reached   Be available to be reached before surgery  In the event the operating room schedule changes, you may be asked to come in earlier or later than expected    *It is important to tell your doctor and others involved in your health care if you are taking or have been taking any non-prescription drugs, vitamins, minerals, herbals or other nutritional supplements   Any of these may interact with some food or medicines and cause a reaction

## 2022-04-05 PROCEDURE — U0005 INFEC AGEN DETEC AMPLI PROBE: HCPCS | Performed by: OPHTHALMOLOGY

## 2022-04-05 PROCEDURE — U0003 INFECTIOUS AGENT DETECTION BY NUCLEIC ACID (DNA OR RNA); SEVERE ACUTE RESPIRATORY SYNDROME CORONAVIRUS 2 (SARS-COV-2) (CORONAVIRUS DISEASE [COVID-19]), AMPLIFIED PROBE TECHNIQUE, MAKING USE OF HIGH THROUGHPUT TECHNOLOGIES AS DESCRIBED BY CMS-2020-01-R: HCPCS | Performed by: OPHTHALMOLOGY

## 2022-04-11 ENCOUNTER — HOSPITAL ENCOUNTER (OUTPATIENT)
Facility: AMBULARY SURGERY CENTER | Age: 87
Setting detail: OUTPATIENT SURGERY
Discharge: HOME | End: 2022-04-11
Attending: OPHTHALMOLOGY | Admitting: OPHTHALMOLOGY
Payer: MEDICARE

## 2022-04-11 ENCOUNTER — ANESTHESIA EVENT (OUTPATIENT)
Dept: PERIOP | Facility: AMBULARY SURGERY CENTER | Age: 87
End: 2022-04-11
Payer: MEDICARE

## 2022-04-11 ENCOUNTER — ANESTHESIA (OUTPATIENT)
Dept: PERIOP | Facility: AMBULARY SURGERY CENTER | Age: 87
End: 2022-04-11
Payer: MEDICARE

## 2022-04-11 VITALS
WEIGHT: 121 LBS | OXYGEN SATURATION: 95 % | TEMPERATURE: 96.5 F | HEART RATE: 63 BPM | DIASTOLIC BLOOD PRESSURE: 86 MMHG | SYSTOLIC BLOOD PRESSURE: 189 MMHG | RESPIRATION RATE: 16 BRPM | BODY MASS INDEX: 24.39 KG/M2 | HEIGHT: 59 IN

## 2022-04-11 DIAGNOSIS — Z20.822 COVID-19 RULED OUT BY LABORATORY TESTING: Primary | ICD-10-CM

## 2022-04-11 DIAGNOSIS — H25.12 AGE-RELATED NUCLEAR CATARACT OF LEFT EYE: ICD-10-CM

## 2022-04-11 PROCEDURE — V2632 POST CHMBR INTRAOCULAR LENS: HCPCS | Performed by: OPHTHALMOLOGY

## 2022-04-11 DEVICE — ACRYSOF(R) IQ ASPHERIC NATURAL IOL, SINGLE-PIECE ACRYLIC FOLDABLE PCL, UV WITH BLUE LIGHTFILTER, 13.0MM LENGTH, 6.0MM ANTERIORASYMMETRIC BICONVEX OPTIC, PLANAR HAPTICS.
Type: IMPLANTABLE DEVICE | Site: EYE | Status: FUNCTIONAL
Brand: ACRYSOF®

## 2022-04-11 RX ORDER — BALANCED SALT SOLUTION 6.4; .75; .48; .3; 3.9; 1.7 MG/ML; MG/ML; MG/ML; MG/ML; MG/ML; MG/ML
SOLUTION OPHTHALMIC AS NEEDED
Status: DISCONTINUED | OUTPATIENT
Start: 2022-04-11 | End: 2022-04-11 | Stop reason: HOSPADM

## 2022-04-11 RX ORDER — GATIFLOXACIN 5 MG/ML
SOLUTION/ DROPS OPHTHALMIC AS NEEDED
Status: DISCONTINUED | OUTPATIENT
Start: 2022-04-11 | End: 2022-04-11 | Stop reason: HOSPADM

## 2022-04-11 RX ORDER — KETOROLAC TROMETHAMINE 5 MG/ML
1 SOLUTION OPHTHALMIC
Status: COMPLETED | OUTPATIENT
Start: 2022-04-11 | End: 2022-04-11

## 2022-04-11 RX ORDER — TETRACAINE HYDROCHLORIDE 5 MG/ML
SOLUTION OPHTHALMIC AS NEEDED
Status: DISCONTINUED | OUTPATIENT
Start: 2022-04-11 | End: 2022-04-11 | Stop reason: HOSPADM

## 2022-04-11 RX ORDER — LIDOCAINE HYDROCHLORIDE 20 MG/ML
1 JELLY TOPICAL
Status: COMPLETED | OUTPATIENT
Start: 2022-04-11 | End: 2022-04-11

## 2022-04-11 RX ORDER — KETOROLAC TROMETHAMINE 5 MG/ML
1 SOLUTION OPHTHALMIC 4 TIMES DAILY
Qty: 5 ML | Refills: 0
Start: 2022-04-11 | End: 2022-05-27

## 2022-04-11 RX ORDER — LIDOCAINE HYDROCHLORIDE 10 MG/ML
INJECTION, SOLUTION EPIDURAL; INFILTRATION; INTRACAUDAL; PERINEURAL AS NEEDED
Status: DISCONTINUED | OUTPATIENT
Start: 2022-04-11 | End: 2022-04-11 | Stop reason: HOSPADM

## 2022-04-11 RX ORDER — PHENYLEPHRINE HCL 2.5 %
1 DROPS OPHTHALMIC (EYE)
Status: COMPLETED | OUTPATIENT
Start: 2022-04-11 | End: 2022-04-11

## 2022-04-11 RX ORDER — CIPROFLOXACIN HYDROCHLORIDE 3.5 MG/ML
1 SOLUTION/ DROPS TOPICAL 4 TIMES DAILY
Qty: 5 ML | Refills: 0
Start: 2022-04-11 | End: 2022-05-27

## 2022-04-11 RX ORDER — TETRACAINE HYDROCHLORIDE 5 MG/ML
1 SOLUTION OPHTHALMIC ONCE
Status: COMPLETED | OUTPATIENT
Start: 2022-04-11 | End: 2022-04-11

## 2022-04-11 RX ORDER — CYCLOPENTOLATE HYDROCHLORIDE 10 MG/ML
1 SOLUTION/ DROPS OPHTHALMIC
Status: COMPLETED | OUTPATIENT
Start: 2022-04-11 | End: 2022-04-11

## 2022-04-11 RX ADMIN — PHENYLEPHRINE HYDROCHLORIDE 1 DROP: 25 SOLUTION/ DROPS OPHTHALMIC at 08:55

## 2022-04-11 RX ADMIN — TETRACAINE HYDROCHLORIDE 1 DROP: 5 SOLUTION OPHTHALMIC at 08:55

## 2022-04-11 RX ADMIN — CYCLOPENTOLATE HYDROCHLORIDE 1 DROP: 10 SOLUTION/ DROPS OPHTHALMIC at 09:40

## 2022-04-11 RX ADMIN — PHENYLEPHRINE HYDROCHLORIDE 1 DROP: 25 SOLUTION/ DROPS OPHTHALMIC at 09:10

## 2022-04-11 RX ADMIN — LIDOCAINE HYDROCHLORIDE 1 APPLICATION: 20 JELLY TOPICAL at 09:25

## 2022-04-11 RX ADMIN — CYCLOPENTOLATE HYDROCHLORIDE 1 DROP: 10 SOLUTION/ DROPS OPHTHALMIC at 09:10

## 2022-04-11 RX ADMIN — LIDOCAINE HYDROCHLORIDE 1 APPLICATION: 20 JELLY TOPICAL at 09:10

## 2022-04-11 RX ADMIN — KETOROLAC TROMETHAMINE 1 DROP: 5 SOLUTION OPHTHALMIC at 09:25

## 2022-04-11 RX ADMIN — PHENYLEPHRINE HYDROCHLORIDE 1 DROP: 25 SOLUTION/ DROPS OPHTHALMIC at 09:40

## 2022-04-11 RX ADMIN — PHENYLEPHRINE HYDROCHLORIDE 1 DROP: 25 SOLUTION/ DROPS OPHTHALMIC at 09:25

## 2022-04-11 RX ADMIN — CYCLOPENTOLATE HYDROCHLORIDE 1 DROP: 10 SOLUTION/ DROPS OPHTHALMIC at 09:25

## 2022-04-11 RX ADMIN — KETOROLAC TROMETHAMINE 1 DROP: 5 SOLUTION OPHTHALMIC at 09:10

## 2022-04-11 RX ADMIN — CYCLOPENTOLATE HYDROCHLORIDE 1 DROP: 10 SOLUTION/ DROPS OPHTHALMIC at 08:55

## 2022-04-11 RX ADMIN — KETOROLAC TROMETHAMINE 1 DROP: 5 SOLUTION OPHTHALMIC at 08:55

## 2022-04-11 RX ADMIN — KETOROLAC TROMETHAMINE 1 DROP: 5 SOLUTION OPHTHALMIC at 09:40

## 2022-04-11 RX ADMIN — LIDOCAINE HYDROCHLORIDE 1 APPLICATION: 20 JELLY TOPICAL at 08:55

## 2022-04-11 NOTE — DISCHARGE INSTRUCTIONS
Dr Alyson Flores Cataract Instructions    Activity:     1  No Driving until instructed   2  Keep shield on until seen tomorrow except when administering drops   3  No heavy lifting > 20 lbs   4  No water in eye for 1 week  Diet:     1  Resume normal diet    Normal Symptoms:     1  Mild Headache   2  Scratchy or picky feeling around eye    Call the office if:     1  You have any questions or concerns   2  If eye pain is not relieved by extra strength tylenol    Office phone number:  [phone]      Next appointment:     1  See Dr Alyson Flores at his office tomorrow as scheduled   ________4/12/22 9 am__________________________________________________   2  Bring blue eye kit with you and eyedrops to the office    A new set of comprehensive instructions will be given and reviewed with you during your office visit tomorrow

## 2022-04-11 NOTE — ANESTHESIA POSTPROCEDURE EVALUATION
Post-Op Assessment Note    CV Status:  Stable       Mental Status:  Sleepy   Hydration Status:  Stable   PONV Controlled:  Controlled   Airway Patency:  Patent      Post Op Vitals Reviewed: Yes      Staff: CRNA         No complications documented      BP  183/91   Temp     Pulse 57   Resp   20   SpO2   99

## 2022-04-11 NOTE — OP NOTE
OPERATIVE REPORT    PATIENT NAME: Carmen Schulz    :  1935  MRN: [id number]  Pt Location: [place] [address] OR ROOM 01    Surgery Date: 2022    Surgeon(s) and Role:     * Mehnaz Mckenzie MD - Primary    Age-related nuclear cataract, left eye [H25 12]    Post-Op Diagnosis Codes:     * Age-related nuclear cataract, left eye [H25 12]    Procedure(s):  EXTRACTION EXTRACAPSULAR CATARACT PHACO INTRAOCULAR LENS (IOL)    Anesthesia Type:   IV Sedation with Anesthesia    Operative Indications:  Age-related nuclear cataract, left eye [H25 12]  Decreased vision to 20/400  With problems reading  Pt requested cataract sx the left eye    Procedure and Technique:    Procedure Details     The patient was brought in the OR in stable condition and placed on the operative table  The left eye was prepped and draped in the usual sterile manner  Attention was directed to the left eye where a lid speculum was placed  A 2 4 mm clear corneal incision was made temperally  1/2 cc of 1% MPF Lidocaine was irrigated into the anterior chamber followed by viscoat  The side port incision was placed superiorly  The capsularrhexis was made and the nucleus was hydrodissected with BSS  The nucleus was then removed with the phaco handpiece followed by removal of the cortical material with the I/A handpiece  The capsular bag was then filled with Provisc  The IOL was folded and placed in to the capsular bag and centered well  The remaining Provisc was removed from the eye with the I/A  The wounds were hydrated with BSS and found to be water tight  The lid speculum was removed and 2 drops of Gatifloxicin were placed over the cornea  A protective eye shield was taped over the eye and the patient went to PACU in stable condition  I will see the patient in the office tomorrow and the expected post op period is a few weeks         Complications: None        Disposition: PACU   Condition: Stable    SIGNATURE: Mehnaz Mckenzie MD  DATE: 2022  TIME: 10:11 AM

## 2022-04-11 NOTE — PERIOPERATIVE NURSING NOTE
Patient received into Pacu via stretcher fromOR, patient reactive, assisted into chair, denies pain  Eye shield intact over    Left eye, no drainage or active bleeding noted from left eye  Patient given snack and tolerating well

## 2022-04-12 RX ORDER — ONDANSETRON 2 MG/ML
4 INJECTION INTRAMUSCULAR; INTRAVENOUS ONCE AS NEEDED
Status: ACTIVE | OUTPATIENT
Start: 2022-04-12

## 2022-05-19 ENCOUNTER — OFFICE VISIT (OUTPATIENT)
Dept: CARDIOLOGY CLINIC | Facility: CLINIC | Age: 87
End: 2022-05-19
Payer: MEDICARE

## 2022-05-19 VITALS
WEIGHT: 121 LBS | BODY MASS INDEX: 24.39 KG/M2 | SYSTOLIC BLOOD PRESSURE: 162 MMHG | HEART RATE: 56 BPM | HEIGHT: 59 IN | DIASTOLIC BLOOD PRESSURE: 70 MMHG

## 2022-05-19 DIAGNOSIS — Z95.2 S/P AORTIC VALVE REPLACEMENT: ICD-10-CM

## 2022-05-19 DIAGNOSIS — I10 ESSENTIAL HYPERTENSION: Primary | ICD-10-CM

## 2022-05-19 DIAGNOSIS — R60.0 BILATERAL LEG EDEMA: ICD-10-CM

## 2022-05-19 DIAGNOSIS — I35.9 AORTIC VALVE DISEASE: ICD-10-CM

## 2022-05-19 DIAGNOSIS — E78.2 MIXED HYPERLIPIDEMIA: ICD-10-CM

## 2022-05-19 DIAGNOSIS — I49.5 SINUS BRADY-TACHY SYNDROME (HCC): ICD-10-CM

## 2022-05-19 PROCEDURE — 99214 OFFICE O/P EST MOD 30 MIN: CPT | Performed by: INTERNAL MEDICINE

## 2022-05-19 RX ORDER — AMLODIPINE BESYLATE 10 MG/1
10 TABLET ORAL DAILY
Qty: 30 TABLET | Refills: 5 | Status: SHIPPED | OUTPATIENT
Start: 2022-05-19

## 2022-05-19 NOTE — PROGRESS NOTES
Cardiology   Anabell Merlos DO, Darvin Nogueira MD, Kam Churchill MD, Cory Limon MD, [facility] - [place]  -------------------------------------------------------------------  Formerly [facility]  [address], [address] [facility] [place] [address], [facility]  [phone]    Cardiology Follow Up  Pretty Gonzáles  1935  [id number]          Assessment/Plan:    1  Aortic valve disease with prior AVR  - recent echocardiogram showed normal valve function  Normal valve gradients without any regurgitation  2  Essential hypertension  -    Home blood pressure log reviewed  Improved from previous but still elevated  She did not develop edema with amlodipine (losartan was increased) - will increase to 10 mg daily  -   Will continue Toprol XL 25 mg daily   Along with losartan 100 mg daily  - Avoid HCTZ because of hyponatremia  - continue home blood pressure monitoring  3  Heart block, first degree  - heart rate has remained stable between 55 and 60 beats per minute  4  Sinus luis angel-tachy syndrome (HCC)  - heart rate normal today  5  Peripheral arteriosclerosis ([address] )  - continue aspirin    6  Bilateral leg edema  - resolved with discontinuation of higher dose of amlodipine  Has been restarted along with increased ARB dose  No edema present today  7  Mixed hyperlipidemia  - Was restarted on Crestor  Repeat blood work ordered  Interval History:     Pretty Gonzáles is 80 y o  female here for followup of hypertension  Since her last visit, she has been feeling well without any chest pain or shortness of breath  Blood pressure has been elevated at home  Home blood pressure ranges from 135-160 over 70-90  Currently, she is using amlodipine 7 5 mg daily along with metoprolol 25 mg daily and losartan 100 mg daily      In November 2020, blood pressure was markedly elevated during that visit and hydrochlorothiazide 25 mg daily was added to her medication regimen  Previously, she developed lower extremity edema which improved with amlodipine discontinuation  Since then, blood pressure has been elevated  She was started on Toprol XL 25 mg daily along with losartan 50 mg daily  Losartan was increased to 100 mg daily and Hydrochlorothiazide was stopped after developing hyponatremia  Previously, felodipine 5 mg daily was added because of elevated blood pressure  She has not had any LE edema but feels it has caused constipation  She was switched back to amlodipine 5 mg daily with increase to 7 5 mg daily  The patient has a history of aortic valve replacement  In June 2015, she had aortic valve [facility] at [facility] for aortic valve stenosis  She has no history of coronary artery disease          Past Medical History:   Diagnosis Date    Aortic valve disease     Arthritis     knees    Chronic kidney disease     stg 3    Glaucoma     Hearing aid worn     bilateral    Heart block, first degree     Heart murmur     Hx of fall 12/2020    laceration of the head    Hyperlipidemia     Hypertension     Macular degeneration     Sinus luis angel-tachy syndrome (HCC)     SSS (sick sinus syndrome) ([address] )     Wears glasses     for reading     Social History     Socioeconomic History    Marital status: /Civil Union     Spouse name: Not on file    Number of children: Not on file    Years of education: Not on file    Highest education level: Not on file   Occupational History    Not on file   Tobacco Use    Smoking status: Never Smoker    Smokeless tobacco: Never Used   Vaping Use    Vaping Use: Never used   Substance and Sexual Activity    Alcohol use: Not Currently    Drug use: Never    Sexual activity: Not on file   Other Topics Concern    Not on file   Social History Narrative    Not on file     Social Determinants of Health     Financial Resource Strain: Not on file   Food Insecurity: Not on file Transportation Needs: Not on file   Physical Activity: Not on file   Stress: Not on file   Social Connections: Not on file   Intimate Partner Violence: Not on file   Housing Stability: Not on file      Family History   Problem Relation Age of Onset    Heart disease Mother     Hypertension Mother     Diabetes Mother     Heart disease Father     Heart attack Father          at 54    Mental illness Neg Hx      Past Surgical History:   Procedure Laterality Date    AORTIC VALVE REPLACEMENT  2015    Porcine valve    CATARACT EXTRACTION Right     COLONOSCOPY      DC XCAPSL CTRC RMVL INSJ IO LENS PROSTH W/O ECP Right 2021    Procedure: EXTRACTION EXTRACAPSULAR CATARACT PHACO INTRAOCULAR LENS (IOL); Surgeon: Heather Cruz MD;  Location: [facility] MAIN OR;  Service: Ophthalmology    DC XCAPSL CTRC RMVL INSJ IO LENS PROSTH W/O ECP Left 2022    Procedure: EXTRACTION EXTRACAPSULAR CATARACT PHACO INTRAOCULAR LENS (IOL);   Surgeon: Heather Cruz MD;  Location: [facility] [place] MAIN OR;  Service: Ophthalmology       Current Outpatient Medications:     acetaminophen (TYLENOL) 325 mg tablet, Take 3 tablets (975 mg total) by mouth every 8 (eight) hours (Patient taking differently: Take 325 mg by mouth every 6 (six) hours as needed), Disp: 30 tablet, Rfl: 0    amLODIPine (NORVASC) 2 5 mg tablet, Take 1 tablet (2 5 mg total) by mouth daily (Patient taking differently: Take 2 5 mg by mouth every morning Take 2 5 mg tablet with 5 mg for 7 5 total), Disp: 30 tablet, Rfl: 3    amLODIPine (NORVASC) 5 mg tablet, Take 1 tablet (5 mg total) by mouth daily (Patient taking differently: Take 5 mg by mouth every morning), Disp: 30 tablet, Rfl: 3    ascorbic acid (VITAMIN C) 500 mg tablet, Take 500 mg by mouth daily with lunch, Disp: , Rfl:     aspirin (ECOTRIN LOW STRENGTH) 81 mg EC tablet, Take 1 tablet (81 mg total) by mouth daily (Patient taking differently: Take 81 mg by mouth daily at bedtime), Disp:  , Rfl: 0   Biotin 1 MG CAPS, Take by mouth daily at bedtime  , Disp: , Rfl:     cholecalciferol (VITAMIN D3) 1,000 units tablet, Take 1,000 Units by mouth daily with lunch  , Disp: , Rfl:     losartan (COZAAR) 100 MG tablet, Take 1 tablet (100 mg total) by mouth daily (Patient taking differently: Take 100 mg by mouth every morning), Disp: 90 tablet, Rfl: 3    LUMIGAN 0 01 % ophthalmic drops, Administer 1 drop to both eyes daily at bedtime , Disp: , Rfl:     metoprolol succinate (TOPROL-XL) 25 mg 24 hr tablet, Take 1 tablet (25 mg total) by mouth daily (Patient taking differently: Take 25 mg by mouth every morning), Disp: 90 tablet, Rfl: 3    Multiple Vitamins-Minerals (PRESERVISION AREDS PO), Take by mouth, Disp: , Rfl:     rosuvastatin (CRESTOR) 5 mg tablet, Take 1 tablet (5 mg total) by mouth daily (Patient taking differently: Take 5 mg by mouth daily with lunch), Disp: 90 tablet, Rfl: 3    timolol (TIMOPTIC-XE) 0 5 % ophthalmic gel-forming, Administer to both eyes every morning  , Disp: , Rfl:     ciprofloxacin (CILOXAN) 0 3 % ophthalmic solution, Administer 1 drop into the left eye 4 (four) times a day, Disp: 5 mL, Rfl: 0    ketorolac (ACULAR) 0 5 % ophthalmic solution, Administer 1 drop into the left eye 4 (four) times a day, Disp: 5 mL, Rfl: 0  No current facility-administered medications for this visit  Facility-Administered Medications Ordered in Other Visits:     ondansetron (ZOFRAN) injection 4 mg, 4 mg, Intravenous, Once PRN, Jody Victor MD        Review of Systems:  Review of Systems   Respiratory: Negative for shortness of breath  Cardiovascular: Negative for chest pain, palpitations and leg swelling  Musculoskeletal: Positive for arthralgias  All other systems reviewed and are negative          Physical Exam:  Vitals:  Vitals:    22 1132   BP: 162/70   BP Location: Right arm   Cuff Size: Standard   Pulse: 56   Weight: 54 9 kg (121 lb)   Height: 4' 11" (1 499 m)     Physical Exam Constitutional: She appears healthy  No distress  Eyes: Pupils are equal, round, and reactive to light  Conjunctivae are normal    Neck: No JVD present  Cardiovascular: Normal rate and regular rhythm  Exam reveals no gallop and no friction rub  Murmur heard  Pulmonary/Chest: Effort normal and breath sounds normal  She has no wheezes  She has no rales  Musculoskeletal:         General: No tenderness, deformity or edema  Cervical back: Normal range of motion and neck supple  Neurological: She is alert and oriented to person, place, and time  Skin: Skin is warm and dry  Cardiographics:  Last known EF:  55-60% with normal bioprosthetic aortic valve  This note was completed in part utilizing M-Modal Fluency Direct Software  Grammatical errors, random word insertions, spelling mistakes, and incomplete sentences can be an occasional consequence of this system secondary to software limitations, ambient noise, and hardware issues  If you have any questions or concerns about the content, text, or information contained within the body of this dictation, please contact the provider for clarification

## 2022-05-20 ENCOUNTER — LAB (OUTPATIENT)
Dept: LAB | Facility: CLINIC | Age: 87
End: 2022-05-20
Payer: MEDICARE

## 2022-05-20 DIAGNOSIS — E78.2 MIXED HYPERLIPIDEMIA: ICD-10-CM

## 2022-05-20 LAB
ALBUMIN SERPL BCP-MCNC: 3.3 G/DL (ref 3.5–5)
ALP SERPL-CCNC: 100 U/L (ref 46–116)
ALT SERPL W P-5'-P-CCNC: 18 U/L (ref 12–78)
ANION GAP SERPL CALCULATED.3IONS-SCNC: 3 MMOL/L (ref 4–13)
AST SERPL W P-5'-P-CCNC: 21 U/L (ref 5–45)
BILIRUB SERPL-MCNC: 0.53 MG/DL (ref 0.2–1)
BUN SERPL-MCNC: 17 MG/DL (ref 5–25)
CALCIUM ALBUM COR SERPL-MCNC: 9.3 MG/DL (ref 8.3–10.1)
CALCIUM SERPL-MCNC: 8.7 MG/DL (ref 8.3–10.1)
CHLORIDE SERPL-SCNC: 103 MMOL/L (ref 100–108)
CHOLEST SERPL-MCNC: 100 MG/DL
CO2 SERPL-SCNC: 29 MMOL/L (ref 21–32)
CREAT SERPL-MCNC: 1.01 MG/DL (ref 0.6–1.3)
GFR SERPL CREATININE-BSD FRML MDRD: 50 ML/MIN/1.73SQ M
GLUCOSE P FAST SERPL-MCNC: 93 MG/DL (ref 65–99)
HDLC SERPL-MCNC: 52 MG/DL
LDLC SERPL CALC-MCNC: 39 MG/DL (ref 0–100)
POTASSIUM SERPL-SCNC: 4.4 MMOL/L (ref 3.5–5.3)
PROT SERPL-MCNC: 8 G/DL (ref 6.4–8.2)
SODIUM SERPL-SCNC: 135 MMOL/L (ref 136–145)
TRIGL SERPL-MCNC: 44 MG/DL

## 2022-05-20 PROCEDURE — 80053 COMPREHEN METABOLIC PANEL: CPT

## 2022-05-20 PROCEDURE — 80061 LIPID PANEL: CPT

## 2022-05-20 PROCEDURE — 36415 COLL VENOUS BLD VENIPUNCTURE: CPT

## 2022-05-23 ENCOUNTER — RA CDI HCC (OUTPATIENT)
Dept: OTHER | Facility: HOSPITAL | Age: 87
End: 2022-05-23

## 2022-05-23 NOTE — PROGRESS NOTES
[address]  coding opportunities       Chart reviewed, no opportunity found: CHART REVIEWED, NO OPPORTUNITY FOUND        Patients Insurance     Medicare Insurance: Medicare

## 2022-05-27 ENCOUNTER — OFFICE VISIT (OUTPATIENT)
Dept: FAMILY MEDICINE CLINIC | Facility: CLINIC | Age: 87
End: 2022-05-27
Payer: MEDICARE

## 2022-05-27 VITALS
TEMPERATURE: 97.2 F | SYSTOLIC BLOOD PRESSURE: 118 MMHG | HEART RATE: 55 BPM | HEIGHT: 59 IN | BODY MASS INDEX: 24.8 KG/M2 | WEIGHT: 123 LBS | OXYGEN SATURATION: 99 % | RESPIRATION RATE: 16 BRPM | DIASTOLIC BLOOD PRESSURE: 62 MMHG

## 2022-05-27 DIAGNOSIS — L98.9 SKIN LESION: ICD-10-CM

## 2022-05-27 DIAGNOSIS — E78.2 MIXED HYPERLIPIDEMIA: ICD-10-CM

## 2022-05-27 DIAGNOSIS — I10 ESSENTIAL HYPERTENSION: Primary | ICD-10-CM

## 2022-05-27 DIAGNOSIS — N18.30 STAGE 3 CHRONIC KIDNEY DISEASE, UNSPECIFIED WHETHER STAGE 3A OR 3B CKD (HCC): ICD-10-CM

## 2022-05-27 PROCEDURE — 99214 OFFICE O/P EST MOD 30 MIN: CPT | Performed by: FAMILY MEDICINE

## 2022-05-27 RX ORDER — MOMETASONE FUROATE 1 MG/G
CREAM TOPICAL DAILY
Qty: 45 G | Refills: 0 | Status: SHIPPED | OUTPATIENT
Start: 2022-05-27

## 2022-05-27 RX ORDER — ROSUVASTATIN CALCIUM 5 MG/1
5 TABLET, COATED ORAL EVERY OTHER DAY
Qty: 90 TABLET | Refills: 3 | Status: SHIPPED | OUTPATIENT
Start: 2022-05-27

## 2022-05-27 NOTE — PROGRESS NOTES
Assessment/Plan:    1  Essential hypertension  Assessment & Plan:  stable    Orders:  -     Comprehensive metabolic panel; Future; Expected date: 11/08/2022    2  Stage 3 chronic kidney disease, unspecified whether stage 3a or 3b CKD ([facility] [address] )  -     Comprehensive metabolic panel; Future; Expected date: 11/08/2022    3  Mixed hyperlipidemia  -     rosuvastatin (CRESTOR) 5 mg tablet; Take 1 tablet (5 mg total) by mouth every other day  -     Lipid Panel with Direct LDL reflex; Future; Expected date: 11/08/2022    4  Skin lesion  -     mometasone (ELOCON) 0 1 % cream; Apply topically daily No more than 14 days            There are no Patient Instructions on file for this visit  Return in about 6 months (around 11/27/2022) for Recheck  Subjective:      Patient ID: Oswaldo Zepeda is a 80 y o  female  Chief Complaint   Patient presents with    Follow-up     On meds,   Elena Proctor MA     possible rash on inner thigh       Pt is here for a three month follow up  Pt had her labs  Pt had her labs      The following portions of the patient's history were reviewed and updated as appropriate: allergies, current medications, past family history, past medical history, past social history, past surgical history and problem list     Review of Systems   Constitutional: Negative  Negative for activity change, appetite change, chills, diaphoresis and fatigue  HENT: Negative  Negative for dental problem, ear pain, sinus pressure and sore throat  Eyes: Negative  Negative for photophobia, pain, discharge, redness, itching and visual disturbance  Respiratory: Negative for apnea and chest tightness  Cardiovascular: Negative  Negative for chest pain, palpitations and leg swelling  Gastrointestinal: Negative  Negative for abdominal distention, abdominal pain, constipation and diarrhea  Endocrine: Negative  Negative for cold intolerance and heat intolerance  Genitourinary: Negative    Negative for difficulty urinating and dyspareunia  Musculoskeletal: Negative  Negative for arthralgias and back pain  Skin: Negative  Lesions on left inner thigh   Allergic/Immunologic: Negative for environmental allergies  Neurological: Negative  Negative for dizziness  Psychiatric/Behavioral: Negative  Negative for agitation  Current Outpatient Medications   Medication Sig Dispense Refill    acetaminophen (TYLENOL) 325 mg tablet Take 3 tablets (975 mg total) by mouth every 8 (eight) hours (Patient taking differently: Take 325 mg by mouth every 6 (six) hours as needed) 30 tablet 0    amLODIPine (NORVASC) 10 mg tablet Take 1 tablet (10 mg total) by mouth in the morning  30 tablet 5    ascorbic acid (VITAMIN C) 500 mg tablet Take 500 mg by mouth daily with lunch      aspirin (ECOTRIN LOW STRENGTH) 81 mg EC tablet Take 1 tablet (81 mg total) by mouth daily (Patient taking differently: Take 81 mg by mouth daily at bedtime)  0    Biotin 1 MG CAPS Take by mouth daily at bedtime        cholecalciferol (VITAMIN D3) 1,000 units tablet Take 1,000 Units by mouth daily with lunch        losartan (COZAAR) 100 MG tablet Take 1 tablet (100 mg total) by mouth daily (Patient taking differently: Take 100 mg by mouth every morning) 90 tablet 3    LUMIGAN 0 01 % ophthalmic drops Administer 1 drop to both eyes daily at bedtime       metoprolol succinate (TOPROL-XL) 25 mg 24 hr tablet Take 1 tablet (25 mg total) by mouth daily (Patient taking differently: Take 25 mg by mouth every morning) 90 tablet 3    mometasone (ELOCON) 0 1 % cream Apply topically daily No more than 14 days 45 g 0    Multiple Vitamins-Minerals (PRESERVISION AREDS PO) Take by mouth      rosuvastatin (CRESTOR) 5 mg tablet Take 1 tablet (5 mg total) by mouth every other day 90 tablet 3    timolol (TIMOPTIC-XE) 0 5 % ophthalmic gel-forming Administer to both eyes every morning         No current facility-administered medications for this visit  Facility-Administered Medications Ordered in Other Visits   Medication Dose Route Frequency Provider Last Rate Last Admin    ondansetron (ZOFRAN) injection 4 mg  4 mg Intravenous Once PRN Zhang Toth MD           Objective:    /62   Pulse 55   Temp (!) 97 2 °F (36 2 °C)   Resp 16   Ht 4' 11" (1 499 m)   Wt 55 8 kg (123 lb)   SpO2 99%   BMI 24 84 kg/m²        Physical Exam  Skin:     Comments: Two small red lesions with pink halo on skin around them              Recent Results (from the past 672 hour(s))   Comprehensive metabolic panel    Collection Time: 05/20/22  7:15 AM   Result Value Ref Range    Sodium 135 (L) 136 - 145 mmol/L    Potassium 4 4 3 5 - 5 3 mmol/L    Chloride 103 100 - 108 mmol/L    CO2 29 21 - 32 mmol/L    ANION GAP 3 (L) 4 - 13 mmol/L    BUN 17 5 - 25 mg/dL    Creatinine 1 01 0 60 - 1 30 mg/dL    Glucose, Fasting 93 65 - 99 mg/dL    Calcium 8 7 8 3 - 10 1 mg/dL    Corrected Calcium 9 3 8 3 - 10 1 mg/dL    AST 21 5 - 45 U/L    ALT 18 12 - 78 U/L    Alkaline Phosphatase 100 46 - 116 U/L    Total Protein 8 0 6 4 - 8 2 g/dL    Albumin 3 3 (L) 3 5 - 5 0 g/dL    Total Bilirubin 0 53 0 20 - 1 00 mg/dL    eGFR 50 ml/min/1 73sq m   Lipid Panel with Direct LDL reflex    Collection Time: 05/20/22  7:15 AM   Result Value Ref Range    Cholesterol 100 See Comment mg/dL    Triglycerides 44 See Comment mg/dL    HDL, Direct 52 >=50 mg/dL    LDL Calculated 39 0 - 100 mg/dL         Dixon Caruso DO

## 2022-05-30 DIAGNOSIS — I10 ESSENTIAL HYPERTENSION: Primary | ICD-10-CM

## 2022-05-31 RX ORDER — AMLODIPINE BESYLATE 5 MG/1
TABLET ORAL
Qty: 30 TABLET | Refills: 5 | OUTPATIENT
Start: 2022-05-31

## 2022-05-31 RX ORDER — AMLODIPINE BESYLATE 2.5 MG/1
TABLET ORAL
Qty: 30 TABLET | Refills: 3 | OUTPATIENT
Start: 2022-05-31

## 2022-06-03 ENCOUNTER — OFFICE VISIT (OUTPATIENT)
Dept: PODIATRY | Facility: CLINIC | Age: 87
End: 2022-06-03
Payer: MEDICARE

## 2022-06-03 VITALS
SYSTOLIC BLOOD PRESSURE: 118 MMHG | WEIGHT: 123 LBS | HEIGHT: 59 IN | BODY MASS INDEX: 24.8 KG/M2 | DIASTOLIC BLOOD PRESSURE: 62 MMHG

## 2022-06-03 DIAGNOSIS — M79.672 PAIN IN BOTH FEET: ICD-10-CM

## 2022-06-03 DIAGNOSIS — I70.209 PERIPHERAL ARTERIOSCLEROSIS (HCC): Primary | ICD-10-CM

## 2022-06-03 DIAGNOSIS — L84 CORNS: ICD-10-CM

## 2022-06-03 DIAGNOSIS — M79.671 PAIN IN BOTH FEET: ICD-10-CM

## 2022-06-03 PROCEDURE — 11056 PARNG/CUTG B9 HYPRKR LES 2-4: CPT | Performed by: PODIATRIST

## 2022-06-06 ENCOUNTER — TELEPHONE (OUTPATIENT)
Dept: CARDIOLOGY CLINIC | Facility: CLINIC | Age: 87
End: 2022-06-06

## 2022-07-29 DIAGNOSIS — I44.0 HEART BLOCK, FIRST DEGREE: ICD-10-CM

## 2022-07-29 DIAGNOSIS — I10 ESSENTIAL HYPERTENSION: ICD-10-CM

## 2022-07-29 RX ORDER — METOPROLOL SUCCINATE 25 MG/1
TABLET, EXTENDED RELEASE ORAL
Qty: 90 TABLET | Refills: 3 | Status: SHIPPED | OUTPATIENT
Start: 2022-07-29 | End: 2022-08-26

## 2022-08-09 RX ORDER — AMLODIPINE BESYLATE 5 MG/1
TABLET ORAL
Qty: 30 TABLET | Refills: 3 | OUTPATIENT
Start: 2022-08-09

## 2022-08-16 ENCOUNTER — OFFICE VISIT (OUTPATIENT)
Dept: PODIATRY | Facility: CLINIC | Age: 87
End: 2022-08-16
Payer: MEDICARE

## 2022-08-16 VITALS
WEIGHT: 123 LBS | SYSTOLIC BLOOD PRESSURE: 118 MMHG | BODY MASS INDEX: 24.8 KG/M2 | DIASTOLIC BLOOD PRESSURE: 62 MMHG | RESPIRATION RATE: 17 BRPM | HEIGHT: 59 IN

## 2022-08-16 DIAGNOSIS — M79.671 PAIN IN BOTH FEET: ICD-10-CM

## 2022-08-16 DIAGNOSIS — I70.209 PERIPHERAL ARTERIOSCLEROSIS (HCC): Primary | ICD-10-CM

## 2022-08-16 DIAGNOSIS — L84 CORNS: ICD-10-CM

## 2022-08-16 DIAGNOSIS — M79.672 PAIN IN BOTH FEET: ICD-10-CM

## 2022-08-16 PROCEDURE — 11056 PARNG/CUTG B9 HYPRKR LES 2-4: CPT | Performed by: PODIATRIST

## 2022-08-16 NOTE — PROGRESS NOTES
Assessment/Plan:  Pain   Metatarsalgia   Peripheral artery disease   Bunion formation   Callus formation   6 plantar lesions noted   Mycotic toenail   Clawtoe     Plan   Foot exam performed   Patient educated on conditions   All mycotic nails debrided   Plantar lesions debrided as well       Subjective:  Patient complains of pain in her feet   Patient has pain with ambulation   No history of trauma   She has pain with shoe wear                 Allergies   Allergen Reactions    Cephalexin              Current Outpatient Prescriptions:     metoprolol tartrate (LOPRESSOR) 50 mg tablet, Take 50 mg by mouth 2 (two) times a day, Disp: , Rfl:     amLODIPine (NORVASC) 5 mg tablet, Take 5 mg by mouth daily, Disp: , Rfl:     aspirin 325 mg tablet, Take 325 mg by mouth daily, Disp: , Rfl:     atorvastatin (LIPITOR) 10 mg tablet, Take 10 mg by mouth daily, Disp: , Rfl:     losartan (COZAAR) 50 mg tablet, , Disp: , Rfl:     LUMIGAN 0 01 % ophthalmic drops, , Disp: , Rfl:                Patient ID: Rea Hiuzar is a 87 y  o  female      HPI     The following portions of the patient's history were reviewed and updated as appropriate: allergies, current medications, past family history, past medical history, past social history, past surgical history and problem list      Review of Systems       Objective:  Patient's shoes and socks removed    Foot Exam     General  General Appearance: appears stated age and healthy   Orientation: alert and oriented to person, place, and time   Affect: appropriate   Gait: antalgic         Right Foot/Ankle      Inspection and Palpation  Ecchymosis: none  Tenderness: metatarsals   Swelling: metatarsals   Arch: pes planus  Hammertoes: fifth toe   234 toes of the right foot are clawed    Hallux valgus: yes  Hallux limitus: yes  Skin Exam: callus;      Neurovascular  Dorsalis pedis: 1+  Posterior tibial: 1+  Superficial peroneal nerve sensation: diminished  Deep peroneal nerve sensation: diminished  Sural nerve sensation: diminished  Achilles reflex: 2+     Muscle Strength  Ankle dorsiflexion: 4+        Left Foot/Ankle       Inspection and Palpation  Tenderness: metatarsals   Swelling: metatarsals   Arch: pes planus  Hammertoes: fifth toe   Toes 234 are clawed a period  Hallux valgus: yes  Hallux limitus: yes     Neurovascular  Dorsalis pedis: 1+  Superficial peroneal nerve sensation: diminished  Deep peroneal nerve sensation: diminished  Sural nerve sensation: diminished  Achilles reflex: 2+     Muscle Strength  Ankle dorsiflexion: 4+        Physical Exam   Constitutional: She appears well-developed and well-nourished  Cardiovascular: Normal rate and regular rhythm     Pulses:       Dorsalis pedis pulses are 1+ on the right side, and 1+ on the left side         Posterior tibial pulses are 1+ on the right side  Q 9 findings noted bilateral   Negative digital hair noted   Positive abnormal cooling    Feet:   Right Foot:   Skin Integrity: Positive for callus  Neurological:   Reflex Scores:       Achilles reflexes are 2+ on the right side and 2+ on the left side  Skin:   Tylenol submetatarsal 2 noted   This is bilateral     Severe thickened mycotic toenail noted bilateral   Nails are yellow with debris   Positive malodor noted      Distal clavi by 2nd 3rd and 4th toes of the left foot    Nursing note and vitals reviewed

## 2022-08-26 ENCOUNTER — OFFICE VISIT (OUTPATIENT)
Dept: CARDIOLOGY CLINIC | Facility: CLINIC | Age: 87
End: 2022-08-26
Payer: MEDICARE

## 2022-08-26 VITALS
OXYGEN SATURATION: 97 % | WEIGHT: 122 LBS | SYSTOLIC BLOOD PRESSURE: 172 MMHG | TEMPERATURE: 98.4 F | HEART RATE: 50 BPM | HEIGHT: 59 IN | BODY MASS INDEX: 24.6 KG/M2 | DIASTOLIC BLOOD PRESSURE: 70 MMHG

## 2022-08-26 DIAGNOSIS — I10 ESSENTIAL HYPERTENSION: Primary | ICD-10-CM

## 2022-08-26 DIAGNOSIS — I70.209 PERIPHERAL ARTERIOSCLEROSIS (HCC): ICD-10-CM

## 2022-08-26 DIAGNOSIS — R60.0 BILATERAL LEG EDEMA: ICD-10-CM

## 2022-08-26 DIAGNOSIS — Z95.2 S/P AORTIC VALVE REPLACEMENT: ICD-10-CM

## 2022-08-26 DIAGNOSIS — I35.9 AORTIC VALVE DISEASE: ICD-10-CM

## 2022-08-26 DIAGNOSIS — I44.0 HEART BLOCK, FIRST DEGREE: ICD-10-CM

## 2022-08-26 DIAGNOSIS — I49.5 SINUS BRADY-TACHY SYNDROME (HCC): ICD-10-CM

## 2022-08-26 DIAGNOSIS — E78.2 MIXED HYPERLIPIDEMIA: ICD-10-CM

## 2022-08-26 PROCEDURE — 93000 ELECTROCARDIOGRAM COMPLETE: CPT | Performed by: INTERNAL MEDICINE

## 2022-08-26 PROCEDURE — 99214 OFFICE O/P EST MOD 30 MIN: CPT | Performed by: INTERNAL MEDICINE

## 2022-08-26 RX ORDER — AMLODIPINE BESYLATE 5 MG/1
5 TABLET ORAL DAILY
Qty: 90 TABLET | Refills: 3 | Status: SHIPPED | OUTPATIENT
Start: 2022-08-26

## 2022-08-26 RX ORDER — AMLODIPINE BESYLATE 5 MG/1
TABLET ORAL
COMMUNITY
Start: 2022-07-12 | End: 2022-08-26

## 2022-08-26 RX ORDER — METOPROLOL SUCCINATE 25 MG/1
12.5 TABLET, EXTENDED RELEASE ORAL DAILY
Qty: 60 TABLET | Refills: 3 | Status: SHIPPED | OUTPATIENT
Start: 2022-08-26

## 2022-08-26 RX ORDER — SPIRONOLACTONE 25 MG/1
25 TABLET ORAL DAILY
Qty: 90 TABLET | Refills: 3 | Status: SHIPPED | OUTPATIENT
Start: 2022-08-26

## 2022-08-26 NOTE — PROGRESS NOTES
Cardiology   Waldemar Conn DO, Dima Brooks MD, Mj Tellez MD, Robina Schmitt MD, [facility] - [place]  -------------------------------------------------------------------  [facility]  [address], [address] [facility],[address], [address]  [place], [address] [place] [address], [address] [place] [address]  [phone]    Cardiology Follow Up  Rohith Cruz  1935  [id number]          Assessment/Plan:    1  Aortic valve disease with prior AVR  - recent echocardiogram showed normal valve function  Normal valve gradients without any regurgitation  2  Essential hypertension  -    Home blood pressure log reviewed  Blood pressure elevated on home readings and in office today  - heart rate is low  Will reduce Toprol XL to 12 5 mg daily  This may also assist with her constipation symptoms  - continue losartan 100 mg daily  - will add spironolactone 25 mg daily  Will need BMP in 1 month  - Avoid HCTZ because of hyponatremia  - continue home blood pressure monitoring  3  Heart block, first degree  - heart rate has remained stable between 55 and 60 beats per minute  4  Sinus luis angel-tachy syndrome (HCC)  - heart rate normal today  5  Peripheral arteriosclerosis ([address] )  - continue aspirin    6  Bilateral leg edema  - resolved with discontinuation of higher dose of amlodipine  Has been restarted along with increased ARB dose  No edema present today  7  Mixed hyperlipidemia  - Crestor was discontinued due to GI side effects  Given age and lack of symptoms  May hold off on statin therapy for primary prevention  Interval History:     Rohith Cruz is 80 y o  female here for followup of hypertension  Since her last visit, amlodipine dose was reduced to 5 mg daily because of constipation  She was also started on rosuvastatin but also felt it was causing GI symptoms and stopped  She denies any chest pain or shortness of breath    Blood pressure has been elevated at home   Currently, she is using amlodipine 5 mg daily along with metoprolol 25 mg daily and losartan 100 mg daily  In November 2020, blood pressure was markedly elevated during that visit and hydrochlorothiazide 25 mg daily was added to her medication regimen  Previously, she developed lower extremity edema which improved with amlodipine discontinuation  Since then, blood pressure has been elevated  She was started on Toprol XL 25 mg daily along with losartan 50 mg daily  Losartan was increased to 100 mg daily and Hydrochlorothiazide was stopped after developing hyponatremia  Previously, felodipine 5 mg daily was added because of elevated blood pressure  She has not had any LE edema but feels it has caused constipation  She was switched back to amlodipine 5 mg daily  The patient has a history of aortic valve replacement  In June 2015, she had aortic valve replacement at [facility] for aortic valve stenosis  She has no history of coronary artery disease          Past Medical History:   Diagnosis Date    Aortic valve disease     Arthritis     knees    Chronic kidney disease     stg 3    Glaucoma     Hearing aid worn     bilateral    Heart block, first degree     Heart murmur     Hx of fall 12/2020    laceration of the head    Hyperlipidemia     Hypertension     Macular degeneration     Sinus luis angel-tachy syndrome (HCC)     SSS (sick sinus syndrome) ([facility] [address] )     Wears glasses     for reading     Social History     Socioeconomic History    Marital status: /Civil Union     Spouse name: Not on file    Number of children: Not on file    Years of education: Not on file    Highest education level: Not on file   Occupational History    Not on file   Tobacco Use    Smoking status: Never Smoker    Smokeless tobacco: Never Used   Vaping Use    Vaping Use: Never used   Substance and Sexual Activity    Alcohol use: Not Currently    Drug use: Never    Sexual activity: Not on file   Other Topics Concern    Not on file   Social History Narrative    Not on file     Social Determinants of Health     Financial Resource Strain: Not on file   Food Insecurity: Not on file   Transportation Needs: Not on file   Physical Activity: Not on file   Stress: Not on file   Social Connections: Not on file   Intimate Partner Violence: Not on file   Housing Stability: Not on file      Family History   Problem Relation Age of Onset    Heart disease Mother     Hypertension Mother     Diabetes Mother     Heart disease Father     Heart attack Father          at 54    Mental illness Neg Hx      Past Surgical History:   Procedure Laterality Date    AORTIC VALVE REPLACEMENT  2015    Porcine valve    CATARACT EXTRACTION Right     COLONOSCOPY      ID XCAPSL CTRC RMVL INSJ IO LENS PROSTH W/O ECP Right 2021    Procedure: EXTRACTION EXTRACAPSULAR CATARACT PHACO INTRAOCULAR LENS (IOL); Surgeon: Latisha Cole MD;  Location: [facility] MAIN OR;  Service: Ophthalmology    ID XCAPSL CTRC RMVL INSJ IO LENS PROSTH W/O ECP Left 2022    Procedure: EXTRACTION EXTRACAPSULAR CATARACT PHACO INTRAOCULAR LENS (IOL); Surgeon: Latisha Cole MD;  Location: [facility] MAIN OR;  Service: Ophthalmology       Current Outpatient Medications:     acetaminophen (TYLENOL) 325 mg tablet, Take 3 tablets (975 mg total) by mouth every 8 (eight) hours (Patient taking differently: Take 325 mg by mouth every 6 (six) hours as needed), Disp: 30 tablet, Rfl: 0    amLODIPine (NORVASC) 10 mg tablet, Take 1 tablet (10 mg total) by mouth in the morning   (Patient taking differently: Take 5 mg by mouth daily), Disp: 30 tablet, Rfl: 5    ascorbic acid (VITAMIN C) 500 mg tablet, Take 500 mg by mouth daily with lunch, Disp: , Rfl:     aspirin (ECOTRIN LOW STRENGTH) 81 mg EC tablet, Take 1 tablet (81 mg total) by mouth daily (Patient taking differently: Take 81 mg by mouth daily at bedtime), Disp:  , Rfl: 0    Biotin 1 MG CAPS, Take by mouth daily at bedtime  , Disp: , Rfl:     cholecalciferol (VITAMIN D3) 1,000 units tablet, Take 1,000 Units by mouth daily with lunch  , Disp: , Rfl:     losartan (COZAAR) 100 MG tablet, Take 1 tablet (100 mg total) by mouth daily (Patient taking differently: Take 100 mg by mouth every morning), Disp: 90 tablet, Rfl: 3    LUMIGAN 0 01 % ophthalmic drops, Administer 1 drop to both eyes daily at bedtime , Disp: , Rfl:     metoprolol succinate (TOPROL-XL) 25 mg 24 hr tablet, TAKE ONE TABLET BY MOUTH EVERY DAY, Disp: 90 tablet, Rfl: 3    mometasone (ELOCON) 0 1 % cream, Apply topically daily No more than 14 days, Disp: 45 g, Rfl: 0    Multiple Vitamins-Minerals (PRESERVISION AREDS PO), Take by mouth, Disp: , Rfl:     timolol (TIMOPTIC-XE) 0 5 % ophthalmic gel-forming, Administer to both eyes every morning  , Disp: , Rfl:     amLODIPine (NORVASC) 5 mg tablet, , Disp: , Rfl:     rosuvastatin (CRESTOR) 5 mg tablet, Take 1 tablet (5 mg total) by mouth every other day (Patient not taking: No sig reported), Disp: 90 tablet, Rfl: 3  No current facility-administered medications for this visit  Facility-Administered Medications Ordered in Other Visits:     ondansetron (ZOFRAN) injection 4 mg, 4 mg, Intravenous, Once PRN, Yovana Osborn MD        Review of Systems:  Review of Systems   Respiratory: Negative for shortness of breath  Cardiovascular: Negative for chest pain, palpitations and leg swelling  Musculoskeletal: Positive for arthralgias  All other systems reviewed and are negative  Physical Exam:  Vitals:  Vitals:    08/26/22 0858 08/26/22 0910   BP: (!) 174/70 (!) 172/70   BP Location: Left arm Right arm   Patient Position: Sitting Sitting   Cuff Size: Standard Standard   Pulse: (!) 50 (!) 50   Temp:  98 4 °F (36 9 °C)   SpO2: 97%    Weight: 55 3 kg (122 lb)    Height: 4' 11" (1 499 m)      Physical Exam   Constitutional: She appears healthy  No distress     Eyes: Pupils are equal, round, and reactive to light  Conjunctivae are normal    Neck: No JVD present  Cardiovascular: Regular rhythm  Bradycardia present  Exam reveals no gallop and no friction rub  Murmur heard  Systolic murmur is present  Pulmonary/Chest: Effort normal and breath sounds normal  She has no wheezes  She has no rales  Musculoskeletal:         General: No tenderness, deformity or edema  Cervical back: Normal range of motion and neck supple  Neurological: She is alert and oriented to person, place, and time  Skin: Skin is warm and dry  Cardiographics:  Last known EF:  55-60% with normal bioprosthetic aortic valve  This note was completed in part utilizing [employer] Direct Software  Grammatical errors, random word insertions, spelling mistakes, and incomplete sentences can be an occasional consequence of this system secondary to software limitations, ambient noise, and hardware issues  If you have any questions or concerns about the content, text, or information contained within the body of this dictation, please contact the provider for clarification

## 2022-08-31 DIAGNOSIS — I10 ESSENTIAL HYPERTENSION: ICD-10-CM

## 2022-08-31 RX ORDER — LOSARTAN POTASSIUM 100 MG/1
TABLET ORAL
Qty: 90 TABLET | Refills: 3 | Status: SHIPPED | OUTPATIENT
Start: 2022-08-31

## 2022-09-14 ENCOUNTER — OFFICE VISIT (OUTPATIENT)
Dept: AUDIOLOGY | Facility: CLINIC | Age: 87
End: 2022-09-14

## 2022-09-14 DIAGNOSIS — H90.3 SENSORINEURAL HEARING LOSS (SNHL), BILATERAL: Primary | ICD-10-CM

## 2022-09-15 NOTE — PROGRESS NOTES
Ms Estevan Roberts daughter arrived asking for domes and wax guards  These were provided  Sue Singh    Clinical Audiologist    [address] [address] [phone]

## 2022-10-18 ENCOUNTER — OFFICE VISIT (OUTPATIENT)
Dept: PODIATRY | Facility: CLINIC | Age: 87
End: 2022-10-18
Payer: MEDICARE

## 2022-10-18 VITALS — RESPIRATION RATE: 17 BRPM | WEIGHT: 122 LBS | BODY MASS INDEX: 24.6 KG/M2 | HEIGHT: 59 IN

## 2022-10-18 DIAGNOSIS — L84 CORNS: ICD-10-CM

## 2022-10-18 DIAGNOSIS — M79.672 PAIN IN BOTH FEET: ICD-10-CM

## 2022-10-18 DIAGNOSIS — M79.671 PAIN IN BOTH FEET: ICD-10-CM

## 2022-10-18 DIAGNOSIS — I70.209 PERIPHERAL ARTERIOSCLEROSIS (HCC): Primary | ICD-10-CM

## 2022-10-18 PROCEDURE — 11056 PARNG/CUTG B9 HYPRKR LES 2-4: CPT | Performed by: PODIATRIST

## 2022-10-18 NOTE — PROGRESS NOTES
Assessment/Plan:  Pain   Metatarsalgia   Peripheral artery disease   Bunion formation   Callus formation   6 plantar lesions noted   Mycotic toenail   Clawtoe     Plan   Foot exam performed   Patient educated on conditions   All mycotic nails debrided   Plantar lesions debrided as well       Subjective:  Patient complains of pain in her feet   Patient has pain with ambulation   No history of trauma   She has pain with shoe wear                 Allergies   Allergen Reactions   • Cephalexin              Current Outpatient Prescriptions:   •  metoprolol tartrate (LOPRESSOR) 50 mg tablet, Take 50 mg by mouth 2 (two) times a day, Disp: , Rfl:   •  amLODIPine (NORVASC) 5 mg tablet, Take 5 mg by mouth daily, Disp: , Rfl:   •  aspirin 325 mg tablet, Take 325 mg by mouth daily, Disp: , Rfl:   •  atorvastatin (LIPITOR) 10 mg tablet, Take 10 mg by mouth daily, Disp: , Rfl:   •  losartan (COZAAR) 50 mg tablet, , Disp: , Rfl:   •  LUMIGAN 0 01 % ophthalmic drops, , Disp: , Rfl:                Patient ID: Rea Huizar is a 87 y  o  female      HPI     The following portions of the patient's history were reviewed and updated as appropriate: allergies, current medications, past family history, past medical history, past social history, past surgical history and problem list      Review of Systems       Objective:  Patient's shoes and socks removed    Foot Exam     General  General Appearance: appears stated age and healthy   Orientation: alert and oriented to person, place, and time   Affect: appropriate   Gait: antalgic         Right Foot/Ankle      Inspection and Palpation  Ecchymosis: none  Tenderness: metatarsals   Swelling: metatarsals   Arch: pes planus  Hammertoes: fifth toe   234 toes of the right foot are clawed    Hallux valgus: yes  Hallux limitus: yes  Skin Exam: callus;      Neurovascular  Dorsalis pedis: 1+  Posterior tibial: 1+  Superficial peroneal nerve sensation: diminished  Deep peroneal nerve sensation: diminished  Sural nerve sensation: diminished  Achilles reflex: 2+     Muscle Strength  Ankle dorsiflexion: 4+        Left Foot/Ankle       Inspection and Palpation  Tenderness: metatarsals   Swelling: metatarsals   Arch: pes planus  Hammertoes: fifth toe   Toes 234 are clawed a period  Hallux valgus: yes  Hallux limitus: yes     Neurovascular  Dorsalis pedis: 1+  Superficial peroneal nerve sensation: diminished  Deep peroneal nerve sensation: diminished  Sural nerve sensation: diminished  Achilles reflex: 2+     Muscle Strength  Ankle dorsiflexion: 4+        Physical Exam   Constitutional: She appears well-developed and well-nourished  Cardiovascular: Normal rate and regular rhythm     Pulses:       Dorsalis pedis pulses are 1+ on the right side, and 1+ on the left side         Posterior tibial pulses are 1+ on the right side  Q 9 findings noted bilateral   Negative digital hair noted   Positive abnormal cooling    Feet:   Right Foot:   Skin Integrity: Positive for callus  Neurological:   Reflex Scores:       Achilles reflexes are 2+ on the right side and 2+ on the left side  Skin:   Tylenol submetatarsal 2 noted   This is bilateral     Severe thickened mycotic toenail noted bilateral   Nails are yellow with debris   Positive malodor noted      Distal clavi by 2nd 3rd and 4th toes of the left foot    Nursing note and vitals reviewed

## 2022-10-25 ENCOUNTER — LAB (OUTPATIENT)
Dept: LAB | Facility: CLINIC | Age: 87
End: 2022-10-25
Payer: MEDICARE

## 2022-10-25 DIAGNOSIS — E78.2 MIXED HYPERLIPIDEMIA: ICD-10-CM

## 2022-10-25 DIAGNOSIS — I10 ESSENTIAL HYPERTENSION: ICD-10-CM

## 2022-10-25 DIAGNOSIS — N18.30 STAGE 3 CHRONIC KIDNEY DISEASE, UNSPECIFIED WHETHER STAGE 3A OR 3B CKD (HCC): ICD-10-CM

## 2022-10-25 LAB
ALBUMIN SERPL BCP-MCNC: 3.2 G/DL (ref 3.5–5)
ALP SERPL-CCNC: 87 U/L (ref 46–116)
ALT SERPL W P-5'-P-CCNC: 15 U/L (ref 12–78)
ANION GAP SERPL CALCULATED.3IONS-SCNC: 4 MMOL/L (ref 4–13)
AST SERPL W P-5'-P-CCNC: 16 U/L (ref 5–45)
BILIRUB SERPL-MCNC: 0.47 MG/DL (ref 0.2–1)
BUN SERPL-MCNC: 19 MG/DL (ref 5–25)
CALCIUM ALBUM COR SERPL-MCNC: 9.7 MG/DL (ref 8.3–10.1)
CALCIUM SERPL-MCNC: 9.1 MG/DL (ref 8.3–10.1)
CHLORIDE SERPL-SCNC: 102 MMOL/L (ref 96–108)
CHOLEST SERPL-MCNC: 157 MG/DL
CO2 SERPL-SCNC: 27 MMOL/L (ref 21–32)
CREAT SERPL-MCNC: 0.96 MG/DL (ref 0.6–1.3)
GFR SERPL CREATININE-BSD FRML MDRD: 53 ML/MIN/1.73SQ M
GLUCOSE P FAST SERPL-MCNC: 96 MG/DL (ref 65–99)
HDLC SERPL-MCNC: 70 MG/DL
LDLC SERPL CALC-MCNC: 75 MG/DL (ref 0–100)
POTASSIUM SERPL-SCNC: 4.1 MMOL/L (ref 3.5–5.3)
PROT SERPL-MCNC: 8.1 G/DL (ref 6.4–8.4)
SODIUM SERPL-SCNC: 133 MMOL/L (ref 135–147)
TRIGL SERPL-MCNC: 61 MG/DL

## 2022-10-25 PROCEDURE — 36415 COLL VENOUS BLD VENIPUNCTURE: CPT

## 2022-10-25 PROCEDURE — 80053 COMPREHEN METABOLIC PANEL: CPT

## 2022-10-25 PROCEDURE — 80061 LIPID PANEL: CPT

## 2022-11-09 ENCOUNTER — OFFICE VISIT (OUTPATIENT)
Dept: AUDIOLOGY | Facility: CLINIC | Age: 87
End: 2022-11-09

## 2022-11-09 DIAGNOSIS — H90.3 SENSORINEURAL HEARING LOSS (SNHL), BILATERAL: Primary | ICD-10-CM

## 2022-11-10 NOTE — PROGRESS NOTES
[facility] AUDIOLOGY HEARING AID DROP OFF    Shea Deluca dropped her right hearing aid off for servicing  Patient is fit with Oticon OPN S 3 minirite R hearing aid(s)  Right serial number [id number]  Left serial number [id number]  Warranty date: 12/21/22 (Loss/Damage and repair)  :  [id number]     Visual inspection revealed no noticeable damage or defects  A listening check revealed a dead aid  Further investigation revealed a broken   This was replaced under warranty  Device is ready for pickup at the patient's next convenience  Ms Marin Guajardo has a visit next week to discuss warranty expiration  Sue Coughlin    Clinical Audiologist    [address] [address] [id number]

## 2022-11-14 ENCOUNTER — OFFICE VISIT (OUTPATIENT)
Dept: AUDIOLOGY | Facility: CLINIC | Age: 87
End: 2022-11-14

## 2022-11-14 DIAGNOSIS — H90.3 SENSORINEURAL HEARING LOSS (SNHL), BILATERAL: Primary | ICD-10-CM

## 2022-11-14 NOTE — PROGRESS NOTES
Hearing Aid Visit:    Name:  Murray Castro  :  1935  Age:  80 y o  Date of Evaluation: 22     Murray Castro is being seen for a hearing aid visit  Patient is fit with Oticon OPN S 3 minirite R hearing aid(s)  Right serial number [id number]  Left serial number [id number]  Warranty date: 22 (Loss/Damage and repair)  Dome/Earmold: 6 mm DB   : Right # 2 85 / Left # 2 85    Action:  1  Changed domes, reinstructed daughter on wax guard placement  2  Aids were not in need of firmware update / aids are at prescription currently  Recommendations:   1   Discussed warranty extensions and provided price list   2  Scheduled an audiological evaluation on 22 (3 years)  Will need order / discussed     Libra Moser , CCC-A, [place]# [id number], Hearing Aid Dispenser, [place]# [id number]  Clinical Audiologist

## 2022-11-20 ENCOUNTER — RA CDI HCC (OUTPATIENT)
Dept: OTHER | Facility: HOSPITAL | Age: 87
End: 2022-11-20

## 2022-11-28 ENCOUNTER — OFFICE VISIT (OUTPATIENT)
Dept: FAMILY MEDICINE CLINIC | Facility: CLINIC | Age: 87
End: 2022-11-28

## 2022-11-28 VITALS
TEMPERATURE: 97 F | OXYGEN SATURATION: 99 % | BODY MASS INDEX: 24.68 KG/M2 | WEIGHT: 122.4 LBS | HEART RATE: 57 BPM | DIASTOLIC BLOOD PRESSURE: 68 MMHG | SYSTOLIC BLOOD PRESSURE: 138 MMHG | HEIGHT: 59 IN | RESPIRATION RATE: 17 BRPM

## 2022-11-28 DIAGNOSIS — E78.2 MIXED HYPERLIPIDEMIA: ICD-10-CM

## 2022-11-28 DIAGNOSIS — N18.30 STAGE 3 CHRONIC KIDNEY DISEASE, UNSPECIFIED WHETHER STAGE 3A OR 3B CKD (HCC): ICD-10-CM

## 2022-11-28 DIAGNOSIS — H91.93 DECREASED HEARING OF BOTH EARS: ICD-10-CM

## 2022-11-28 DIAGNOSIS — I10 ESSENTIAL HYPERTENSION: Primary | ICD-10-CM

## 2022-11-28 DIAGNOSIS — Z23 NEED FOR VACCINATION: ICD-10-CM

## 2022-11-28 NOTE — PROGRESS NOTES
Assessment/Plan:    1  Essential hypertension  -     CBC; Future; Expected date: 05/12/2023  -     Comprehensive metabolic panel; Future; Expected date: 05/12/2023    2  Stage 3 chronic kidney disease, unspecified whether stage 3a or 3b CKD (HCC)  -     CBC; Future; Expected date: 05/12/2023    3  Mixed hyperlipidemia  Assessment & Plan:  Not on lipid meds at thios time    Orders:  -     Comprehensive metabolic panel; Future; Expected date: 05/12/2023    4  Need for vaccination  -     influenza vaccine, high-dose, PF 0 5 mL    5  Decreased hearing of both ears  -     Ambulatory Referral to Audiology; Future            There are no Patient Instructions on file for this visit  No follow-ups on file  Subjective:      Patient ID: Jigna Holbrook is a 80 y o  female      Chief Complaint   Patient presents with   • Follow-up     klcma   • Hearing Problem     Pt needs hearing test referral  Huma Cain       Pt is here for a follow up  Pt had labs  Pt feels well    Pt needs referral for hearing test, last one was 4 years ago      The following portions of the patient's history were reviewed and updated as appropriate: allergies, current medications, past family history, past medical history, past social history, past surgical history and problem list     Review of Systems      Current Outpatient Medications   Medication Sig Dispense Refill   • acetaminophen (TYLENOL) 325 mg tablet Take 3 tablets (975 mg total) by mouth every 8 (eight) hours (Patient taking differently: Take 325 mg by mouth every 6 (six) hours as needed) 30 tablet 0   • amLODIPine (NORVASC) 5 mg tablet Take 1 tablet (5 mg total) by mouth daily 90 tablet 3   • ascorbic acid (VITAMIN C) 500 mg tablet Take 500 mg by mouth daily with lunch     • aspirin (ECOTRIN LOW STRENGTH) 81 mg EC tablet Take 1 tablet (81 mg total) by mouth daily (Patient taking differently: Take 81 mg by mouth daily at bedtime)  0   • Biotin 1 MG CAPS Take by mouth daily at bedtime       • cholecalciferol (VITAMIN D3) 1,000 units tablet Take 1,000 Units by mouth daily with lunch       • losartan (COZAAR) 100 MG tablet TAKE ONE TABLET BY MOUTH EVERY DAY 90 tablet 3   • LUMIGAN 0 01 % ophthalmic drops Administer 1 drop to both eyes daily at bedtime      • metoprolol succinate (TOPROL-XL) 25 mg 24 hr tablet Take 0 5 tablets (12 5 mg total) by mouth daily 60 tablet 3   • mometasone (ELOCON) 0 1 % cream Apply topically daily No more than 14 days 45 g 0   • Multiple Vitamins-Minerals (PRESERVISION AREDS PO) Take by mouth     • spironolactone (ALDACTONE) 25 mg tablet Take 1 tablet (25 mg total) by mouth daily 90 tablet 3   • timolol (TIMOPTIC-XE) 0 5 % ophthalmic gel-forming Administer to both eyes every morning         No current facility-administered medications for this visit       Facility-Administered Medications Ordered in Other Visits   Medication Dose Route Frequency Provider Last Rate Last Admin   • ondansetron (ZOFRAN) injection 4 mg  4 mg Intravenous Once PRN Guzman Sam MD           Objective:    /68   Pulse 57   Temp (!) 97 °F (36 1 °C)   Resp 17   Ht 4' 11" (1 499 m)   Wt 55 5 kg (122 lb 6 4 oz)   SpO2 99%   BMI 24 72 kg/m²        Physical Exam         Recent Results (from the past 2016 hour(s))   Comprehensive metabolic panel    Collection Time: 10/25/22  7:16 AM   Result Value Ref Range    Sodium 133 (L) 135 - 147 mmol/L    Potassium 4 1 3 5 - 5 3 mmol/L    Chloride 102 96 - 108 mmol/L    CO2 27 21 - 32 mmol/L    ANION GAP 4 4 - 13 mmol/L    BUN 19 5 - 25 mg/dL    Creatinine 0 96 0 60 - 1 30 mg/dL    Glucose, Fasting 96 65 - 99 mg/dL    Calcium 9 1 8 3 - 10 1 mg/dL    Corrected Calcium 9 7 8 3 - 10 1 mg/dL    AST 16 5 - 45 U/L    ALT 15 12 - 78 U/L    Alkaline Phosphatase 87 46 - 116 U/L    Total Protein 8 1 6 4 - 8 4 g/dL    Albumin 3 2 (L) 3 5 - 5 0 g/dL    Total Bilirubin 0 47 0 20 - 1 00 mg/dL    eGFR 53 ml/min/1 73sq m   Lipid Panel with Direct LDL reflex    Collection Time: 10/25/22  7:16 AM   Result Value Ref Range    Cholesterol 157 See Comment mg/dL    Triglycerides 61 See Comment mg/dL    HDL, Direct 70 >=50 mg/dL    LDL Calculated 75 0 - 100 mg/dL         Sharlene Negrete, DO

## 2022-12-06 ENCOUNTER — OFFICE VISIT (OUTPATIENT)
Dept: AUDIOLOGY | Facility: CLINIC | Age: 87
End: 2022-12-06

## 2022-12-06 DIAGNOSIS — H90.3 SENSORINEURAL HEARING LOSS (SNHL), BILATERAL: Primary | ICD-10-CM

## 2022-12-06 NOTE — PROGRESS NOTES
HEARING EVALUATION    Name:  Isha Ochoa  :  1935  Age:  80 y o  Date of Evaluation: 22     History:  Known bilateral hearing loss, right greater than left   Reason for visit: Isha Ochoa is being seen today at the request of Dr Trena Oliver for an evaluation of hearing  It has been 3 years since her last evaluation  She was in office recently for a hearing aid visit and we scheduled this visit for follow up in case reprogramming is needed  EVALUATION:    Otoscopic Evaluation:   Right Ear: Minimum cerumen    Left Ear: mild dry skin in canal (1/2 way back)    Tympanometry:   Right: Type As - reduced compliance   Left: Type As - reduced compliance    Audiogram Results:  Mild precipitously sloping to severe SHL bilaterally  Thresholds are consistent with those obtained in 10/2019  *see attached audiogram for details     RECOMMENDATIONS:  1  RTO 2023 for HA visit  2  Annual audiological evaluations for monitoring purposes     PATIENT EDUCATION:   Discussed results and recommendations with Mrs Huizar and her daughter  Questions were addressed and the patient was encouraged to contact our department should concerns arise      Libra Delong , [facility], [place], [place]# [id number], Hearing Aid Dispenser, [place]# [id number]  Clinical Audiologist

## 2022-12-06 NOTE — PROGRESS NOTES
Hearing Aid Visit:    Name:  Akua Talavera  :  1935  Age:  80 y o  Date of Evaluation: 22     Akua Talavera is being seen for a hearing aid visit  Patient is fit with Oticon OPN S 3 minirite R hearing aid(s)  Right serial number [id number]  Left serial number [id number]  Warranty date: 22 (Loss/Damage and repair)  Dome/Earmold: 6 mm DB   : Right # 2 85 / Left # 2 85    Action:  1  Reprogrammed aids to updated audiogram    Recommendations:   1   Patient chose to extend her warranties / ext to 2023 with [facility] customer service      Libra Deng , CCC-A, [place]# [id number]  Clinical Audiologist

## 2022-12-29 ENCOUNTER — OFFICE VISIT (OUTPATIENT)
Dept: PODIATRY | Facility: CLINIC | Age: 87
End: 2022-12-29

## 2022-12-29 VITALS
BODY MASS INDEX: 24.6 KG/M2 | SYSTOLIC BLOOD PRESSURE: 138 MMHG | DIASTOLIC BLOOD PRESSURE: 68 MMHG | WEIGHT: 122 LBS | RESPIRATION RATE: 16 BRPM | HEIGHT: 59 IN

## 2022-12-29 DIAGNOSIS — L84 CORNS: ICD-10-CM

## 2022-12-29 DIAGNOSIS — M79.672 PAIN IN BOTH FEET: ICD-10-CM

## 2022-12-29 DIAGNOSIS — I70.209 PERIPHERAL ARTERIOSCLEROSIS (HCC): Primary | ICD-10-CM

## 2022-12-29 DIAGNOSIS — M79.671 PAIN IN BOTH FEET: ICD-10-CM

## 2023-03-10 ENCOUNTER — TELEPHONE (OUTPATIENT)
Dept: FAMILY MEDICINE CLINIC | Facility: CLINIC | Age: 88
End: 2023-03-10

## 2023-03-10 NOTE — TELEPHONE ENCOUNTER
Patients daughter returned my call, Marisa Roman is not feeling well right now so her daughter is going to call our office to schedule her AWV when she is feeling better    NUNU Leija/EVE

## 2023-04-27 ENCOUNTER — OFFICE VISIT (OUTPATIENT)
Dept: PODIATRY | Facility: CLINIC | Age: 88
End: 2023-04-27

## 2023-04-27 VITALS
HEIGHT: 59 IN | DIASTOLIC BLOOD PRESSURE: 70 MMHG | WEIGHT: 127 LBS | SYSTOLIC BLOOD PRESSURE: 138 MMHG | BODY MASS INDEX: 25.6 KG/M2 | RESPIRATION RATE: 16 BRPM

## 2023-04-27 DIAGNOSIS — M79.672 PAIN IN BOTH FEET: ICD-10-CM

## 2023-04-27 DIAGNOSIS — L84 CORNS: ICD-10-CM

## 2023-04-27 DIAGNOSIS — I70.209 PERIPHERAL ARTERIOSCLEROSIS (HCC): Primary | ICD-10-CM

## 2023-04-27 DIAGNOSIS — M79.671 PAIN IN BOTH FEET: ICD-10-CM

## 2023-05-02 ENCOUNTER — OFFICE VISIT (OUTPATIENT)
Dept: CARDIOLOGY CLINIC | Facility: CLINIC | Age: 88
End: 2023-05-02

## 2023-05-02 VITALS
HEIGHT: 59 IN | WEIGHT: 127 LBS | BODY MASS INDEX: 25.6 KG/M2 | HEART RATE: 54 BPM | OXYGEN SATURATION: 98 % | DIASTOLIC BLOOD PRESSURE: 90 MMHG | SYSTOLIC BLOOD PRESSURE: 202 MMHG

## 2023-05-02 DIAGNOSIS — E78.2 MIXED HYPERLIPIDEMIA: ICD-10-CM

## 2023-05-02 DIAGNOSIS — I70.209 PERIPHERAL ARTERIOSCLEROSIS (HCC): ICD-10-CM

## 2023-05-02 DIAGNOSIS — I35.9 AORTIC VALVE DISEASE: ICD-10-CM

## 2023-05-02 DIAGNOSIS — I10 ESSENTIAL HYPERTENSION: ICD-10-CM

## 2023-05-02 DIAGNOSIS — R60.0 BILATERAL LEG EDEMA: ICD-10-CM

## 2023-05-02 DIAGNOSIS — I49.5 SINUS BRADY-TACHY SYNDROME (HCC): Primary | ICD-10-CM

## 2023-05-02 DIAGNOSIS — Z95.2 S/P AORTIC VALVE REPLACEMENT: ICD-10-CM

## 2023-05-02 NOTE — PROGRESS NOTES
Cardiology   Dania Castano DO, Anibal Mckenzie MD, Amber Rahman MD, Omar Khan MD, [facility] - [place]  -------------------------------------------------------------------  [facility]THOP[facility]IC [facility]  [address], [address] [facility], [address] [place] [address], [facility] [facility] [address]  [phone]    Cardiology Follow Up  Heri Villegas  1935  [id number]          Assessment/Plan:    1  Aortic valve disease with prior AVR  -Most recent echocardiogram showed normal valve function  Normal valve gradients without any regurgitation  2  Essential hypertension  -    Home blood pressure log reviewed  Blood pressure  is significantly lower on home blood pressure readings compared to the office today  Repeat check was better   -Continue monitoring at home and call with updated log in 1 week  - continue losartan 100 mg daily   -Continue spironolactone 25 mg daily  - Avoid HCTZ because of hyponatremia  3  Heart block, first degree  - heart rate has remained stable between 55 and 60 beats per minute  4  Sinus luis angel-tachy syndrome (HCC)  - heart rate normal today  5  Peripheral arteriosclerosis ([address] )  - continue aspirin    6  Bilateral leg edema  - resolved with discontinuation of higher dose of amlodipine  Has been restarted along with increased ARB dose  No edema present today  7  Mixed hyperlipidemia  - Crestor was discontinued due to GI side effects  Given age and lack of symptoms  May hold off on statin therapy for primary prevention  Interval History:     Heri Villegas is 80 y o  female here for followup of hypertension  Since her last visit, amlodipine dose was reduced to 5 mg daily because of constipation  She was also started on rosuvastatin but also felt it was causing GI symptoms and stopped  She denies any chest pain or shortness of breath  Blood pressure has been elevated at home    Currently, she is using spironolactone 25 mg daily along with metoprolol 25 mg daily and losartan 100 mg daily  Home blood pressure readings have been well controlled  Blood pressure is elevated in office today  Recheck was better  In November 2020, blood pressure was markedly elevated during that visit and hydrochlorothiazide 25 mg daily was added to her medication regimen  Previously, she developed lower extremity edema which improved with amlodipine discontinuation  Since then, blood pressure has been elevated  She was started on Toprol XL 25 mg daily along with losartan 50 mg daily  Losartan was increased to 100 mg daily and Hydrochlorothiazide was stopped after developing hyponatremia  Previously, felodipine 5 mg daily was added because of elevated blood pressure  She has not had any LE edema but feels it has caused constipation  She was switched back to amlodipine 5 mg daily  The patient has a history of aortic valve replacement  In June 2015, she had aortic valve replacement at [facility] for aortic valve stenosis  She has no history of coronary artery disease          Past Medical History:   Diagnosis Date    Aortic valve disease     Arthritis     knees    Chronic kidney disease     stg 3    Glaucoma     Hearing aid worn     bilateral    Heart block, first degree     Heart murmur     Hx of fall 12/2020    laceration of the head    Hyperlipidemia     Hypertension     Macular degeneration     Sinus luis angel-tachy syndrome (HCC)     S[address] (sick [facility] syndrome) ([facility] [address]     Wears glasses     for reading     Social History     Socioeconomic History    Marital status: /Civil Union     Spouse name: Not on file    Number of children: Not on file    Years of education: Not on file    Highest education level: Not on file   Occupational History    Not on file   Tobacco Use    Smoking status: Never    Smokeless tobacco: Never   Vaping Use    Vaping Use: Never used   Substance and Sexual Activity    Alcohol use: Not Currently    Drug use: Never    Sexual activity: Not on file   Other Topics Concern    Not on file   Social History Narrative    Not on file     Social Determinants of Health     Financial Resource Strain: Low Risk     Difficulty of Paying Living Expenses: Not hard at all   Food Insecurity: Not on file   Transportation Needs: No Transportation Needs    Lack of Transportation (Medical): No    Lack of Transportation (Non-Medical): No   Physical Activity: Not on file   Stress: Not on file   Social Connections: Not on file   Intimate Partner Violence: Not on file   Housing Stability: Not on file      Family History   Problem Relation Age of Onset    Heart disease Mother     Hypertension Mother     Diabetes Mother     Heart disease Father     Heart attack Father          at 54    Mental illness Neg Hx      Past Surgical History:   Procedure Laterality Date    AORTIC VALVE REPLACEMENT  2015    Porcine valve    CATARACT EXTRACTION Right     COLONOSCOPY      AR XCAPSL CTRC RMVL INSJ IO LENS PROSTH W/O ECP Right 2021    Procedure: EXTRACTION EXTRACAPSULAR CATARACT PHACO INTRAOCULAR LENS (IOL); Surgeon: Michael Huddleston MD;  Location: [facility] MAIN OR;  Service: Ophthalmology    AR XCAPSL CTRC RMVL INSJ IO LENS PROSTH W/O ECP Left 2022    Procedure: EXTRACTION EXTRACAPSULAR CATARACT PHACO INTRAOCULAR LENS (IOL);   Surgeon: Michael Huddleston MD;  Location: [facility] MAIN OR;  Service: Ophthalmology       Current Outpatient Medications:     acetaminophen (TYLENOL) 325 mg tablet, Take 3 tablets (975 mg total) by mouth every 8 (eight) hours (Patient taking differently: Take 325 mg by mouth every 6 (six) hours as needed), Disp: 30 tablet, Rfl: 0    amLODIPine (NORVASC) 5 mg tablet, Take 1 tablet (5 mg total) by mouth daily, Disp: 90 tablet, Rfl: 3    ascorbic acid (VITAMIN C) 500 mg tablet, Take 500 mg by mouth daily with lunch, Disp: , Rfl:     aspirin (ECOTRIN LOW STRENGTH) 81 mg EC tablet, Take 1 "tablet (81 mg total) by mouth daily (Patient taking differently: Take 81 mg by mouth daily at bedtime), Disp:  , Rfl: 0    Biotin 1 MG CAPS, Take by mouth daily at bedtime  , Disp: , Rfl:     cholecalciferol (VITAMIN D3) 1,000 units tablet, Take 1,000 Units by mouth daily with lunch  , Disp: , Rfl:     losartan (COZAAR) 100 MG tablet, TAKE ONE TABLET BY MOUTH EVERY DAY, Disp: 90 tablet, Rfl: 3    LUMIGAN 0 01 % ophthalmic drops, Administer 1 drop to both eyes daily at bedtime , Disp: , Rfl:     metoprolol succinate (TOPROL-XL) 25 mg 24 hr tablet, Take 0 5 tablets (12 5 mg total) by mouth daily, Disp: 60 tablet, Rfl: 3    Multiple Vitamins-Minerals (PRESERVISION AREDS PO), Take by mouth, Disp: , Rfl:     spironolactone (ALDACTONE) 25 mg tablet, Take 1 tablet (25 mg total) by mouth daily, Disp: 90 tablet, Rfl: 3    timolol (TIMOPTIC-XE) 0 5 % ophthalmic gel-forming, Administer to both eyes every morning  , Disp: , Rfl:   No current facility-administered medications for this visit  Facility-Administered Medications Ordered in Other Visits:     ondansetron (ZOFRAN) injection 4 mg, 4 mg, Intravenous, Once PRN, [place] Rg MD        Review of Systems:  Review of Systems   Respiratory: Negative for shortness of breath  Cardiovascular: Negative for chest pain, palpitations and leg swelling  Gastrointestinal: Positive for constipation  Musculoskeletal: Positive for arthralgias  All other systems reviewed and are negative  Physical Exam:  Vitals:  Vitals:    05/02/23 1527   BP: (!) 202/90   BP Location: Left arm   Patient Position: Sitting   Cuff Size: Standard   Pulse: (!) 54   SpO2: 98%   Weight: 57 6 kg (127 lb)   Height: 4' 11\" (1 499 m)     Physical Exam   Constitutional: She appears healthy  No distress  Eyes: Pupils are equal, round, and reactive to light  Conjunctivae are normal    Neck: No JVD present  Cardiovascular: Normal rate, regular rhythm and normal heart sounds   Exam reveals " no gallop and no friction rub  No murmur heard  Pulmonary/Chest: Effort normal and breath sounds normal  She has no wheezes  She has no rales  Musculoskeletal:         General: No tenderness, deformity or edema  Cervical back: Normal range of motion and neck supple  Neurological: She is alert and oriented to person, place, and time  Skin: Skin is warm and dry  Cardiographics:  Last known EF:  55-60% with normal bioprosthetic aortic valve  This note was completed in part utilizing [employer] Direct Software  Grammatical errors, random word insertions, spelling mistakes, and incomplete sentences can be an occasional consequence of this system secondary to software limitations, ambient noise, and hardware issues  If you have any questions or concerns about the content, text, or information contained within the body of this dictation, please contact the provider for clarification

## 2023-07-06 ENCOUNTER — OFFICE VISIT (OUTPATIENT)
Dept: PODIATRY | Facility: CLINIC | Age: 88
End: 2023-07-06
Payer: MEDICARE

## 2023-07-06 VITALS — WEIGHT: 127 LBS | HEIGHT: 59 IN | BODY MASS INDEX: 25.6 KG/M2 | RESPIRATION RATE: 16 BRPM

## 2023-07-06 DIAGNOSIS — M79.671 PAIN IN BOTH FEET: ICD-10-CM

## 2023-07-06 DIAGNOSIS — L84 CORNS: ICD-10-CM

## 2023-07-06 DIAGNOSIS — M79.672 PAIN IN BOTH FEET: ICD-10-CM

## 2023-07-06 DIAGNOSIS — I70.209 PERIPHERAL ARTERIOSCLEROSIS (HCC): Primary | ICD-10-CM

## 2023-07-06 PROCEDURE — 11056 PARNG/CUTG B9 HYPRKR LES 2-4: CPT | Performed by: PODIATRIST

## 2023-07-06 NOTE — PROGRESS NOTES
Assessment/Plan:  Pain.  Metatarsalgia.  Peripheral artery disease.  Bunion formation.  Callus formation.  6 plantar lesions noted.  Mycotic toenail.  Clawtoe     Plan.  Foot exam performed.  Patient educated on conditions.  All mycotic nails debrided.  Plantar lesions debrided as well.      Subjective:  Patient complains of pain in her feet.  Patient has pain with ambulation.  No history of trauma.  She has pain with shoe wear.                Allergies   Allergen Reactions   • Cephalexin              Current Outpatient Prescriptions:   •  metoprolol tartrate (LOPRESSOR) 50 mg tablet, Take 50 mg by mouth 2 (two) times a day, Disp: , Rfl:   •  amLODIPine (NORVASC) 5 mg tablet, Take 5 mg by mouth daily, Disp: , Rfl:   •  aspirin 325 mg tablet, Take 325 mg by mouth daily, Disp: , Rfl:   •  atorvastatin (LIPITOR) 10 mg tablet, Take 10 mg by mouth daily, Disp: , Rfl:   •  losartan (COZAAR) 50 mg tablet, , Disp: , Rfl:   •  LUMIGAN 0.01 % ophthalmic drops, , Disp: , Rfl:                Patient ID: Rea Huizar is a 87 y. o. female.     HPI     The following portions of the patient's history were reviewed and updated as appropriate: allergies, current medications, past family history, past medical history, past social history, past surgical history and problem list.     Review of Systems       Objective:  Patient's shoes and socks removed.   Foot Exam     General  General Appearance: appears stated age and healthy   Orientation: alert and oriented to person, place, and time   Affect: appropriate   Gait: antalgic         Right Foot/Ankle      Inspection and Palpation  Ecchymosis: none  Tenderness: metatarsals   Swelling: metatarsals   Arch: pes planus  Hammertoes: fifth toe.  234 toes of the right foot are clawed.   Hallux valgus: yes  Hallux limitus: yes  Skin Exam: callus;      Neurovascular  Dorsalis pedis: 1+  Posterior tibial: 1+  Superficial peroneal nerve sensation: diminished  Deep peroneal nerve sensation: diminished  Sural nerve sensation: diminished  Achilles reflex: 2+     Muscle Strength  Ankle dorsiflexion: 4+        Left Foot/Ankle       Inspection and Palpation  Tenderness: metatarsals   Swelling: metatarsals   Arch: pes planus  Hammertoes: fifth toe.  Toes 234 are clawed a period  Hallux valgus: yes  Hallux limitus: yes     Neurovascular  Dorsalis pedis: 1+  Superficial peroneal nerve sensation: diminished  Deep peroneal nerve sensation: diminished  Sural nerve sensation: diminished  Achilles reflex: 2+     Muscle Strength  Ankle dorsiflexion: 4+        Physical Exam   Constitutional: She appears well-developed and well-nourished. Cardiovascular: Normal rate and regular rhythm.    Pulses:       Dorsalis pedis pulses are 1+ on the right side, and 1+ on the left side.        Posterior tibial pulses are 1+ on the right side. Q 9 findings noted bilateral.  Negative digital hair noted.  Positive abnormal cooling.   Feet:   Right Foot:   Skin Integrity: Positive for callus. Neurological:   Reflex Scores:       Achilles reflexes are 2+ on the right side and 2+ on the left side. Skin:   Tylenol submetatarsal 2 noted.  This is bilateral.    Severe thickened mycotic toenail noted bilateral.  Nails are yellow with debris.  Positive malodor noted      Distal clavi by 2nd 3rd and 4th toes of the left foot.   Nursing note and vitals reviewed.

## 2023-07-28 DIAGNOSIS — I10 ESSENTIAL HYPERTENSION: ICD-10-CM

## 2023-07-28 DIAGNOSIS — I44.0 HEART BLOCK, FIRST DEGREE: ICD-10-CM

## 2023-07-28 RX ORDER — METOPROLOL SUCCINATE 25 MG/1
25 TABLET, EXTENDED RELEASE ORAL DAILY
Qty: 90 TABLET | Refills: 3 | Status: SHIPPED | OUTPATIENT
Start: 2023-07-28

## 2023-07-29 DIAGNOSIS — I10 ESSENTIAL HYPERTENSION: ICD-10-CM

## 2023-07-29 DIAGNOSIS — I44.0 HEART BLOCK, FIRST DEGREE: ICD-10-CM

## 2023-07-31 RX ORDER — METOPROLOL SUCCINATE 25 MG/1
25 TABLET, EXTENDED RELEASE ORAL DAILY
Qty: 90 TABLET | Refills: 3 | OUTPATIENT
Start: 2023-07-31

## 2023-08-27 DIAGNOSIS — I10 ESSENTIAL HYPERTENSION: ICD-10-CM

## 2023-08-28 RX ORDER — SPIRONOLACTONE 25 MG/1
25 TABLET ORAL DAILY
Qty: 90 TABLET | Refills: 3 | Status: SHIPPED | OUTPATIENT
Start: 2023-08-28

## 2023-08-28 RX ORDER — LOSARTAN POTASSIUM 100 MG/1
TABLET ORAL
Qty: 90 TABLET | Refills: 3 | Status: SHIPPED | OUTPATIENT
Start: 2023-08-28

## 2023-08-28 RX ORDER — AMLODIPINE BESYLATE 5 MG/1
5 TABLET ORAL DAILY
Qty: 90 TABLET | Refills: 3 | Status: SHIPPED | OUTPATIENT
Start: 2023-08-28

## 2023-09-18 ENCOUNTER — OFFICE VISIT (OUTPATIENT)
Dept: CARDIOLOGY CLINIC | Facility: CLINIC | Age: 88
End: 2023-09-18
Payer: MEDICARE

## 2023-09-18 VITALS
DIASTOLIC BLOOD PRESSURE: 68 MMHG | SYSTOLIC BLOOD PRESSURE: 120 MMHG | BODY MASS INDEX: 25.2 KG/M2 | HEIGHT: 59 IN | WEIGHT: 125 LBS | OXYGEN SATURATION: 98 % | HEART RATE: 81 BPM

## 2023-09-18 DIAGNOSIS — Z95.2 S/P AORTIC VALVE REPLACEMENT: ICD-10-CM

## 2023-09-18 DIAGNOSIS — R60.0 BILATERAL LEG EDEMA: ICD-10-CM

## 2023-09-18 DIAGNOSIS — I49.5 SINUS BRADY-TACHY SYNDROME (HCC): Primary | ICD-10-CM

## 2023-09-18 DIAGNOSIS — I44.0 HEART BLOCK, FIRST DEGREE: ICD-10-CM

## 2023-09-18 DIAGNOSIS — I35.9 AORTIC VALVE DISEASE: ICD-10-CM

## 2023-09-18 DIAGNOSIS — I10 ESSENTIAL HYPERTENSION: ICD-10-CM

## 2023-09-18 DIAGNOSIS — E78.2 MIXED HYPERLIPIDEMIA: ICD-10-CM

## 2023-09-18 DIAGNOSIS — I70.209 PERIPHERAL ARTERIOSCLEROSIS (HCC): ICD-10-CM

## 2023-09-18 DIAGNOSIS — I48.0 PAROXYSMAL ATRIAL FIBRILLATION (HCC): ICD-10-CM

## 2023-09-18 PROCEDURE — 93000 ELECTROCARDIOGRAM COMPLETE: CPT | Performed by: INTERNAL MEDICINE

## 2023-09-18 PROCEDURE — 99214 OFFICE O/P EST MOD 30 MIN: CPT | Performed by: INTERNAL MEDICINE

## 2023-09-18 NOTE — PROGRESS NOTES
Cardiology   Sharon Espinosa DO, Chris Winn MD, Ml Gibbs MD, Gala Betts MD, [facility] - [place]  -------------------------------------------------------------------  [facility]  [address] [place] [address], [address] [place] [phone]    Cardiology Follow Up  Lizzy Garcia  1935  [id number]          Assessment/Plan:    1. Aortic valve disease with prior AVR  -Most recent echocardiogram showed normal valve function. Normal valve gradients without any regurgitation. 2. Essential hypertension  -  Blood pressure controlled on current Rx.     - continue losartan 100 mg daily.  -Continue spironolactone 25 mg daily. - Avoid HCTZ because of hyponatremia. 3. Paroxysmal atrial fibrillation  - Rate is stable   - Not on anticoagulation due to fall risk     4. Sinus luis angel-tachy syndrome (HCC)  - heart rate normal today. 5. Peripheral arteriosclerosis ([address])  - continue aspirin    6. Bilateral leg edema  - resolved with discontinuation of higher dose of amlodipine. Has been restarted along with increased ARB dose. No edema present today. 7. Mixed hyperlipidemia  - Crestor was discontinued due to GI side effects. Given age and lack of symptoms. May hold off on statin therapy for primary prevention. Interval History:     Lizzy Garcia is 80 y.o. female here for followup of hypertension. Since her last visit, she has been feeling well.  she denies any palpitations, chest pain, shortness of breath, LE edema, orthopnea or PND. Diet is overall unchanged. There has not been a significant change in weight. BP has been controlled. Amlodipine dose was reduced to 5 mg daily because of constipation. She was also started on rosuvastatin but also felt it was causing GI symptoms and stopped. Currently, she is using spironolactone 25 mg daily along with metoprolol 25 mg daily and losartan 100 mg daily.      In November 2020, blood pressure was markedly elevated during that visit and hydrochlorothiazide 25 mg daily was added to her medication regimen. Previously, she developed lower extremity edema which improved with amlodipine discontinuation. Since then, blood pressure has been elevated. She was started on Toprol XL 25 mg daily along with losartan 50 mg daily. Losartan was increased to 100 mg daily and Hydrochlorothiazide was stopped after developing hyponatremia. Previously, felodipine 5 mg daily was added because of elevated blood pressure. She has not had any LE edema but feels it has caused constipation. She was switched back to amlodipine 5 mg daily. The patient has a history of aortic valve replacement. In June 2015, she had aortic valve replacement at [facility] for aortic valve stenosis. She has no history of coronary artery disease.         Past Medical History:   Diagnosis Date   • Aortic valve disease    • Arthritis     knees   • Chronic kidney disease     stg 3   • Glaucoma    • Hearing aid worn     bilateral   • Heart block, first degree    • Heart murmur    • Hx of fall 12/2020    laceration of the head   • Hyperlipidemia    • Hypertension    • Macular degeneration    • Sinus luis angel-tachy syndrome (HCC)    • SSS (sick sinus syndrome) ([facility])    • Wears glasses     for reading     Social History     Socioeconomic History   • Marital status: /Civil Union     Spouse name: Not on file   • Number of children: Not on file   • Years of education: Not on file   • Highest education level: Not on file   Occupational History   • Not on file   Tobacco Use   • Smoking status: Never   • Smokeless tobacco: Never   Vaping Use   • Vaping Use: Never used   Substance and Sexual Activity   • Alcohol use: Not Currently   • Drug use: Never   • Sexual activity: Not on file   Other Topics Concern   • Not on file   Social History Narrative   • Not on file     Social Determinants of Health Financial Resource Strain: Low Risk  (2023)    Overall Financial Resource Strain (CARDIA)    • Difficulty of Paying Living Expenses: Not hard at all   Food Insecurity: Not on file   Transportation Needs: No Transportation Needs (2023)    PRAPARE - Transportation    • Lack of Transportation (Medical): No    • Lack of Transportation (Non-Medical): No   Physical Activity: Not on file   Stress: Not on file   Social Connections: Not on file   Intimate Partner Violence: Not on file   Housing Stability: Not on file      Family History   Problem Relation Age of Onset   • Heart disease Mother    • Hypertension Mother    • Diabetes Mother    • Heart disease Father    • Heart attack Father          at 54   • Mental illness Neg Hx      Past Surgical History:   Procedure Laterality Date   • AORTIC VALVE REPLACEMENT  2015    Porcine valve   • CATARACT EXTRACTION Right    • COLONOSCOPY     • FL XCAPSL CTRC RMVL INSJ IO LENS PROSTH W/O ECP Right 2021    Procedure: EXTRACTION EXTRACAPSULAR CATARACT PHACO INTRAOCULAR LENS (IOL); Surgeon: Blossom Pagan MD;  Location: [facility] MAIN OR;  Service: Ophthalmology   • FL XCAPSL CTRC RMVL INSJ IO LENS PROSTH W/O ECP Left 2022    Procedure: EXTRACTION EXTRACAPSULAR CATARACT PHACO INTRAOCULAR LENS (IOL);   Surgeon: Blossom Pagan MD;  Location: [facility] MAIN OR;  Service: Ophthalmology       Current Outpatient Medications:   •  acetaminophen (TYLENOL) 325 mg tablet, Take 3 tablets (975 mg total) by mouth every 8 (eight) hours (Patient taking differently: Take 325 mg by mouth every 6 (six) hours as needed), Disp: 30 tablet, Rfl: 0  •  amLODIPine (NORVASC) 5 mg tablet, TAKE ONE TABLET BY MOUTH EVERY DAY, Disp: 90 tablet, Rfl: 3  •  ascorbic acid (VITAMIN C) 500 mg tablet, Take 500 mg by mouth daily with lunch, Disp: , Rfl:   •  aspirin (ECOTRIN LOW STRENGTH) 81 mg EC tablet, Take 1 tablet (81 mg total) by mouth daily (Patient taking differently: Take 81 mg by mouth daily at bedtime), Disp:  , Rfl: 0  •  Biotin 1 MG CAPS, Take by mouth daily at bedtime  , Disp: , Rfl:   •  cholecalciferol (VITAMIN D3) 1,000 units tablet, Take 1,000 Units by mouth daily with lunch  , Disp: , Rfl:   •  losartan (COZAAR) 100 MG tablet, TAKE ONE TABLET BY MOUTH EVERY DAY, Disp: 90 tablet, Rfl: 3  •  LUMIGAN 0.01 % ophthalmic drops, Administer 1 drop to both eyes daily at bedtime , Disp: , Rfl:   •  metoprolol succinate (TOPROL-XL) 25 mg 24 hr tablet, TAKE ONE TABLET BY MOUTH EVERY DAY, Disp: 90 tablet, Rfl: 3  •  Multiple Vitamins-Minerals (PRESERVISION AREDS PO), Take by mouth, Disp: , Rfl:   •  spironolactone (ALDACTONE) 25 mg tablet, TAKE ONE TABLET BY MOUTH EVERY DAY, Disp: 90 tablet, Rfl: 3  •  timolol (TIMOPTIC-XE) 0.5 % ophthalmic gel-forming, Administer to both eyes every morning  , Disp: , Rfl:   No current facility-administered medications for this visit. Facility-Administered Medications Ordered in Other Visits:   •  ondansetron (ZOFRAN) injection 4 mg, 4 mg, Intravenous, Once PRN, Tasha Valentin MD        Review of Systems:  Review of Systems   Respiratory: Negative for shortness of breath. Cardiovascular: Negative for chest pain, palpitations and leg swelling. Gastrointestinal: Positive for constipation. Musculoskeletal: Positive for arthralgias. All other systems reviewed and are negative. Physical Exam:  Vitals:  Vitals:    09/18/23 1412   BP: 120/68   BP Location: Right arm   Patient Position: Sitting   Cuff Size: Standard   Pulse: 81   SpO2: 98%   Weight: 56.7 kg (125 lb)   Height: 4' 11" (1.499 m)     Physical Exam   Constitutional: She appears healthy. No distress. Eyes: Pupils are equal, round, and reactive to light. Conjunctivae are normal.   Neck: No JVD present. Cardiovascular: Normal rate. An irregularly irregular rhythm present. Exam reveals no gallop and no friction rub. Murmur heard.   Pulmonary/Chest: Effort normal and breath sounds normal. She has no wheezes. She has no rales. Musculoskeletal:         General: No tenderness, deformity or edema. Cervical back: Normal range of motion and neck supple. Neurological: She is alert and oriented to person, place, and time. Skin: Skin is warm and dry. Cardiographics:  Last known EF:  55-60% with normal bioprosthetic aortic valve. This note was completed in part utilizing [employer] Direct Software. Grammatical errors, random word insertions, spelling mistakes, and incomplete sentences can be an occasional consequence of this system secondary to software limitations, ambient noise, and hardware issues. If you have any questions or concerns about the content, text, or information contained within the body of this dictation, please contact the provider for clarification.

## 2023-09-28 ENCOUNTER — OFFICE VISIT (OUTPATIENT)
Dept: PODIATRY | Facility: CLINIC | Age: 88
End: 2023-09-28
Payer: MEDICARE

## 2023-09-28 VITALS
HEIGHT: 59 IN | SYSTOLIC BLOOD PRESSURE: 120 MMHG | WEIGHT: 125 LBS | RESPIRATION RATE: 16 BRPM | DIASTOLIC BLOOD PRESSURE: 68 MMHG | BODY MASS INDEX: 25.2 KG/M2

## 2023-09-28 DIAGNOSIS — I70.209 PERIPHERAL ARTERIOSCLEROSIS (HCC): Primary | ICD-10-CM

## 2023-09-28 DIAGNOSIS — M79.671 PAIN IN BOTH FEET: ICD-10-CM

## 2023-09-28 DIAGNOSIS — M79.672 PAIN IN BOTH FEET: ICD-10-CM

## 2023-09-28 DIAGNOSIS — L84 CORNS: ICD-10-CM

## 2023-09-28 PROCEDURE — 11056 PARNG/CUTG B9 HYPRKR LES 2-4: CPT | Performed by: PODIATRIST

## 2023-09-28 NOTE — PROGRESS NOTES
Assessment/Plan:  Pain.  Metatarsalgia.  Peripheral artery disease.  Bunion formation.  Callus formation.  6 plantar lesions noted.  Mycotic toenail.  Clawtoe     Plan.  Foot exam performed.  Patient educated on conditions.  All mycotic nails debrided.  Plantar lesions debrided as well.      Subjective:  Patient complains of pain in her feet.  Patient has pain with ambulation.  No history of trauma.  She has pain with shoe wear.                Allergies   Allergen Reactions   • Cephalexin              Current Outpatient Prescriptions:   •  metoprolol tartrate (LOPRESSOR) 50 mg tablet, Take 50 mg by mouth 2 (two) times a day, Disp: , Rfl:   •  amLODIPine (NORVASC) 5 mg tablet, Take 5 mg by mouth daily, Disp: , Rfl:   •  aspirin 325 mg tablet, Take 325 mg by mouth daily, Disp: , Rfl:   •  atorvastatin (LIPITOR) 10 mg tablet, Take 10 mg by mouth daily, Disp: , Rfl:   •  losartan (COZAAR) 50 mg tablet, , Disp: , Rfl:   •  LUMIGAN 0.01 % ophthalmic drops, , Disp: , Rfl:                Patient ID: Rea Huizar is a 88 y. o. female.     HPI     The following portions of the patient's history were reviewed and updated as appropriate: allergies, current medications, past family history, past medical history, past social history, past surgical history and problem list.     Review of Systems       Objective:  Patient's shoes and socks removed.   Foot Exam     General  General Appearance: appears stated age and healthy   Orientation: alert and oriented to person, place, and time   Affect: appropriate   Gait: antalgic         Right Foot/Ankle      Inspection and Palpation  Ecchymosis: none  Tenderness: metatarsals   Swelling: metatarsals   Arch: pes planus  Hammertoes: fifth toe.  234 toes of the right foot are clawed.   Hallux valgus: yes  Hallux limitus: yes  Skin Exam: callus;      Neurovascular  Dorsalis pedis: 1+  Posterior tibial: 1+  Superficial peroneal nerve sensation: diminished  Deep peroneal nerve sensation: diminished  Sural nerve sensation: diminished  Achilles reflex: 2+     Muscle Strength  Ankle dorsiflexion: 4+        Left Foot/Ankle       Inspection and Palpation  Tenderness: metatarsals   Swelling: metatarsals   Arch: pes planus  Hammertoes: fifth toe.  Toes 234 are clawed a period  Hallux valgus: yes  Hallux limitus: yes     Neurovascular  Dorsalis pedis: 1+  Superficial peroneal nerve sensation: diminished  Deep peroneal nerve sensation: diminished  Sural nerve sensation: diminished  Achilles reflex: 2+     Muscle Strength  Ankle dorsiflexion: 4+        Physical Exam   Constitutional: She appears well-developed and well-nourished. Cardiovascular: Normal rate and regular rhythm.    Pulses:       Dorsalis pedis pulses are 1+ on the right side, and 1+ on the left side.        Posterior tibial pulses are 1+ on the right side. Q 9 findings noted bilateral.  Negative digital hair noted.  Positive abnormal cooling.   Feet:   Right Foot:   Skin Integrity: Positive for callus. Neurological:   Reflex Scores:       Achilles reflexes are 2+ on the right side and 2+ on the left side. Skin:   Tylenol submetatarsal 2 noted.  This is bilateral.    Severe thickened mycotic toenail noted bilateral.  Nails are yellow with debris.  Positive malodor noted      Distal clavi by 2nd 3rd and 4th toes of the left foot.   Nursing note and vitals reviewed.

## 2023-10-31 ENCOUNTER — TELEPHONE (OUTPATIENT)
Age: 88
End: 2023-10-31

## 2023-10-31 NOTE — TELEPHONE ENCOUNTER
Daughter called with concerns about Mom having recently experiencing confusion, bad dreams, short term memory loss, panicking about things that aren't really happening.   She scheduled an appointment for Thursday 11/02, but wanted to know if Dr. Hannah Nassar wants to order labs prior to her being seen

## 2023-11-01 ENCOUNTER — LAB (OUTPATIENT)
Dept: LAB | Facility: CLINIC | Age: 88
End: 2023-11-01
Payer: MEDICARE

## 2023-11-01 DIAGNOSIS — I10 ESSENTIAL HYPERTENSION: ICD-10-CM

## 2023-11-01 DIAGNOSIS — N18.30 STAGE 3 CHRONIC KIDNEY DISEASE, UNSPECIFIED WHETHER STAGE 3A OR 3B CKD (HCC): ICD-10-CM

## 2023-11-01 DIAGNOSIS — E78.2 MIXED HYPERLIPIDEMIA: ICD-10-CM

## 2023-11-01 LAB
ALBUMIN SERPL BCP-MCNC: 3.8 G/DL (ref 3.5–5)
ALP SERPL-CCNC: 84 U/L (ref 34–104)
ALT SERPL W P-5'-P-CCNC: 12 U/L (ref 7–52)
ANION GAP SERPL CALCULATED.3IONS-SCNC: 6 MMOL/L
AST SERPL W P-5'-P-CCNC: 18 U/L (ref 13–39)
BILIRUB SERPL-MCNC: 0.5 MG/DL (ref 0.2–1)
BUN SERPL-MCNC: 23 MG/DL (ref 5–25)
CALCIUM SERPL-MCNC: 9 MG/DL (ref 8.4–10.2)
CHLORIDE SERPL-SCNC: 99 MMOL/L (ref 96–108)
CHOLEST SERPL-MCNC: 152 MG/DL
CO2 SERPL-SCNC: 27 MMOL/L (ref 21–32)
CREAT SERPL-MCNC: 1 MG/DL (ref 0.6–1.3)
ERYTHROCYTE [DISTWIDTH] IN BLOOD BY AUTOMATED COUNT: 14.6 % (ref 11.6–15.1)
GFR SERPL CREATININE-BSD FRML MDRD: 50 ML/MIN/1.73SQ M
GLUCOSE P FAST SERPL-MCNC: 95 MG/DL (ref 65–99)
HCT VFR BLD AUTO: 39.4 % (ref 34.8–46.1)
HDLC SERPL-MCNC: 59 MG/DL
HGB BLD-MCNC: 13 G/DL (ref 11.5–15.4)
LDLC SERPL CALC-MCNC: 81 MG/DL (ref 0–100)
MCH RBC QN AUTO: 30.5 PG (ref 26.8–34.3)
MCHC RBC AUTO-ENTMCNC: 33 G/DL (ref 31.4–37.4)
MCV RBC AUTO: 93 FL (ref 82–98)
PLATELET # BLD AUTO: 203 THOUSANDS/UL (ref 149–390)
PMV BLD AUTO: 10.3 FL (ref 8.9–12.7)
POTASSIUM SERPL-SCNC: 4.7 MMOL/L (ref 3.5–5.3)
PROT SERPL-MCNC: 7.6 G/DL (ref 6.4–8.4)
RBC # BLD AUTO: 4.26 MILLION/UL (ref 3.81–5.12)
SODIUM SERPL-SCNC: 132 MMOL/L (ref 135–147)
TRIGL SERPL-MCNC: 62 MG/DL
WBC # BLD AUTO: 4.71 THOUSAND/UL (ref 4.31–10.16)

## 2023-11-01 PROCEDURE — 36415 COLL VENOUS BLD VENIPUNCTURE: CPT

## 2023-11-01 PROCEDURE — 80061 LIPID PANEL: CPT

## 2023-11-01 PROCEDURE — 85027 COMPLETE CBC AUTOMATED: CPT

## 2023-11-01 PROCEDURE — 80053 COMPREHEN METABOLIC PANEL: CPT

## 2023-11-02 ENCOUNTER — OFFICE VISIT (OUTPATIENT)
Dept: FAMILY MEDICINE CLINIC | Facility: CLINIC | Age: 88
End: 2023-11-02
Payer: MEDICARE

## 2023-11-02 ENCOUNTER — TELEPHONE (OUTPATIENT)
Dept: FAMILY MEDICINE CLINIC | Facility: CLINIC | Age: 88
End: 2023-11-02

## 2023-11-02 VITALS
RESPIRATION RATE: 16 BRPM | HEIGHT: 59 IN | OXYGEN SATURATION: 94 % | TEMPERATURE: 98 F | DIASTOLIC BLOOD PRESSURE: 64 MMHG | HEART RATE: 80 BPM | BODY MASS INDEX: 25.68 KG/M2 | WEIGHT: 127.4 LBS | SYSTOLIC BLOOD PRESSURE: 108 MMHG

## 2023-11-02 DIAGNOSIS — R41.3 POOR SHORT TERM MEMORY: Primary | ICD-10-CM

## 2023-11-02 DIAGNOSIS — N18.30 STAGE 3 CHRONIC KIDNEY DISEASE, UNSPECIFIED WHETHER STAGE 3A OR 3B CKD (HCC): ICD-10-CM

## 2023-11-02 DIAGNOSIS — E78.2 MIXED HYPERLIPIDEMIA: ICD-10-CM

## 2023-11-02 DIAGNOSIS — R41.89 COGNITIVE DECLINE: ICD-10-CM

## 2023-11-02 DIAGNOSIS — I10 ESSENTIAL HYPERTENSION: ICD-10-CM

## 2023-11-02 LAB
SL AMB  POCT GLUCOSE, UA: 0
SL AMB LEUKOCYTE ESTERASE,UA: 0
SL AMB POCT CLARITY,UA: CLEAR
SL AMB POCT COLOR,UA: YELLOW
SL AMB POCT KETONES,UA: 0
SL AMB POCT NITRITE,UA: 0
SL AMB POCT PH,UA: 7
SL AMB POCT SPECIFIC GRAVITY,UA: 1.02
SL AMB POCT URINE PROTEIN: 0
SL AMB POCT UROBILINOGEN: 0.2

## 2023-11-02 PROCEDURE — 81003 URINALYSIS AUTO W/O SCOPE: CPT | Performed by: FAMILY MEDICINE

## 2023-11-02 PROCEDURE — 99214 OFFICE O/P EST MOD 30 MIN: CPT | Performed by: FAMILY MEDICINE

## 2023-11-02 RX ORDER — DONEPEZIL HYDROCHLORIDE 5 MG/1
5 TABLET, FILM COATED ORAL
Qty: 90 TABLET | Refills: 1 | Status: SHIPPED | OUTPATIENT
Start: 2023-11-02 | End: 2024-04-30

## 2023-11-02 NOTE — PROGRESS NOTES
Assessment/Plan:    1. Poor short term memory    2. Cognitive decline  -     donepezil (ARICEPT) 5 mg tablet; Take 1 tablet (5 mg total) by mouth daily at bedtime  -     Ambulatory Referral to Neurology; Future  -     Comprehensive metabolic panel; Future; Expected date: 04/15/2024  -     Lipid Panel with Direct LDL reflex; Future; Expected date: 04/15/2024  -     CBC; Future; Expected date: 04/15/2024  -     TSH, 3rd generation; Future; Expected date: 04/15/2024    3. Mixed hyperlipidemia  -     Comprehensive metabolic panel; Future; Expected date: 04/15/2024  -     Lipid Panel with Direct LDL reflex; Future; Expected date: 04/15/2024  -     CBC; Future; Expected date: 04/15/2024  -     TSH, 3rd generation; Future; Expected date: 04/15/2024    4. Stage 3 chronic kidney disease, unspecified whether stage 3a or 3b CKD ([address])  -     Comprehensive metabolic panel; Future; Expected date: 04/15/2024  -     Lipid Panel with Direct LDL reflex; Future; Expected date: 04/15/2024  -     CBC; Future; Expected date: 04/15/2024  -     TSH, 3rd generation; Future; Expected date: 04/15/2024    5. Essential hypertension  Assessment & Plan:  stable    Orders:  -     Comprehensive metabolic panel; Future; Expected date: 04/15/2024  -     Lipid Panel with Direct LDL reflex; Future; Expected date: 04/15/2024  -     CBC; Future; Expected date: 04/15/2024  -     TSH, 3rd generation; Future; Expected date: 04/15/2024            There are no Patient Instructions on file for this visit. Return in about 4 weeks (around 11/30/2023) for Recheck. Subjective:      Patient ID: Consuelo Shelley is a 80 y.o. female. Chief Complaint   Patient presents with   • Confusion   • Altered Mental Status   • short term memory loss     Patients daughter noticed over the past weekend, Bon/EVE       Pt is here with daughter - poss mental status changes.     Daughter would like a urine taken    Daughter states the short term memory is getting worse - pt asks same questions over and over again. Also seems to think things have happened that did not happen  Gets agiotated. Pt also was not reponding positively to explantions of what did actually happen  Short term memory has def worsened over the last month  Pt diod have labs    They have tried [place] for memory - OTC - unsure if she is taking that as directed. They have also tried prvagen for a while  - that did not help        The following portions of the patient's history were reviewed and updated as appropriate: allergies, current medications, past family history, past medical history, past social history, past surgical history and problem list.    Review of Systems   Constitutional: Negative. Negative for activity change, appetite change, chills, diaphoresis and fatigue. HENT: Negative. Negative for dental problem, ear pain, sinus pressure and sore throat. Eyes: Negative. Negative for photophobia, pain, discharge, redness, itching and visual disturbance. Respiratory:  Negative for apnea and chest tightness. Cardiovascular: Negative. Negative for chest pain, palpitations and leg swelling. Gastrointestinal: Negative. Negative for abdominal distention, abdominal pain, constipation and diarrhea. Endocrine: Negative. Negative for cold intolerance and heat intolerance. Genitourinary: Negative. Negative for difficulty urinating and dyspareunia. Musculoskeletal: Negative. Negative for arthralgias and back pain. Skin: Negative. Allergic/Immunologic: Negative for environmental allergies. Neurological: Negative. Negative for dizziness. Psychiatric/Behavioral:  Positive for agitation and decreased concentration.           Current Outpatient Medications   Medication Sig Dispense Refill   • acetaminophen (TYLENOL) 325 mg tablet Take 3 tablets (975 mg total) by mouth every 8 (eight) hours (Patient taking differently: Take 325 mg by mouth every 6 (six) hours as needed) 30 tablet 0   • amLODIPine (NORVASC) 5 mg tablet TAKE ONE TABLET BY MOUTH EVERY DAY 90 tablet 3   • ascorbic acid (VITAMIN C) 500 mg tablet Take 500 mg by mouth daily with lunch     • aspirin (ECOTRIN LOW STRENGTH) 81 mg EC tablet Take 1 tablet (81 mg total) by mouth daily (Patient taking differently: Take 81 mg by mouth daily at bedtime)  0   • Biotin 1 MG CAPS Take by mouth daily at bedtime       • cholecalciferol (VITAMIN D3) 1,000 units tablet Take 1,000 Units by mouth daily with lunch       • donepezil (ARICEPT) 5 mg tablet Take 1 tablet (5 mg total) by mouth daily at bedtime 90 tablet 1   • losartan (COZAAR) 100 MG tablet TAKE ONE TABLET BY MOUTH EVERY DAY 90 tablet 3   • LUMIGAN 0.01 % ophthalmic drops Administer 1 drop to both eyes daily at bedtime      • metoprolol succinate (TOPROL-XL) 25 mg 24 hr tablet TAKE ONE TABLET BY MOUTH EVERY DAY 90 tablet 3   • Multiple Vitamins-Minerals (PRESERVISION AREDS PO) Take by mouth     • spironolactone (ALDACTONE) 25 mg tablet TAKE ONE TABLET BY MOUTH EVERY DAY 90 tablet 3   • timolol (TIMOPTIC-XE) 0.5 % ophthalmic gel-forming Administer to both eyes every morning         No current facility-administered medications for this visit. Facility-Administered Medications Ordered in Other Visits   Medication Dose Route Frequency Provider Last Rate Last Admin   • ondansetron (ZOFRAN) injection 4 mg  4 mg Intravenous Once PRN Ana Burns MD           Objective:    /64   Pulse 80   Temp 98 °F (36.7 °C) (Temporal)   Resp 16   Ht 4' 11" (1.499 m)   Wt 57.8 kg (127 lb 6.4 oz)   SpO2 94%   BMI 25.73 kg/m²        Physical Exam  Vitals and nursing note reviewed. Constitutional:       General: She is not in acute distress. Appearance: She is well-developed. She is not diaphoretic. HENT:      Head: Normocephalic and atraumatic.       Right Ear: External ear normal.      Left Ear: External ear normal.      Nose: Nose normal.      Mouth/Throat:      Pharynx: No oropharyngeal exudate. Eyes:      General: No scleral icterus. Right eye: No discharge. Left eye: No discharge. Pupils: Pupils are equal, round, and reactive to light. Neck:      Thyroid: No thyromegaly. Cardiovascular:      Rate and Rhythm: Normal rate. Heart sounds: Normal heart sounds. No murmur heard. Pulmonary:      Effort: Pulmonary effort is normal. No respiratory distress. Breath sounds: Normal breath sounds. No wheezing. Abdominal:      General: Bowel sounds are normal. There is no distension. Palpations: Abdomen is soft. There is no mass. Tenderness: There is no abdominal tenderness. There is no guarding or rebound. Musculoskeletal:         General: Normal range of motion. Skin:     General: Skin is warm and dry. Findings: No erythema or rash. Neurological:      Mental Status: She is alert. Coordination: Coordination normal.      Deep Tendon Reflexes: Reflexes normal.   Psychiatric:         Behavior: Behavior normal.         Cognition and Memory: Cognition is impaired. Memory is impaired.               Recent Results (from the past 672 hour(s))   CBC    Collection Time: 11/01/23  7:25 AM   Result Value Ref Range    WBC 4.71 4.31 - 10.16 Thousand/uL    RBC 4.26 3.81 - 5.12 Million/uL    Hemoglobin 13.0 11.5 - 15.4 g/dL    Hematocrit 39.4 34.8 - 46.1 %    MCV 93 82 - 98 fL    MCH 30.5 26.8 - 34.3 pg    MCHC 33.0 31.4 - 37.4 g/dL    RDW 14.6 11.6 - 15.1 %    Platelets 195 402 - 983 Thousands/uL    MPV 10.3 8.9 - 12.7 fL   Comprehensive metabolic panel    Collection Time: 11/01/23  7:25 AM   Result Value Ref Range    Sodium 132 (L) 135 - 147 mmol/L    Potassium 4.7 3.5 - 5.3 mmol/L    Chloride 99 96 - 108 mmol/L    CO2 27 21 - 32 mmol/L    ANION GAP 6 mmol/L    BUN 23 5 - 25 mg/dL    Creatinine 1.00 0.60 - 1.30 mg/dL    Glucose, Fasting 95 65 - 99 mg/dL    Calcium 9.0 8.4 - 10.2 mg/dL    AST 18 13 - 39 U/L    ALT 12 7 - 52 U/L    Alkaline Phosphatase 84 34 - 104 U/L    Total Protein 7.6 6.4 - 8.4 g/dL    Albumin 3.8 3.5 - 5.0 g/dL    Total Bilirubin 0.50 0.20 - 1.00 mg/dL    eGFR 50 ml/min/1.73sq m   Lipid Panel with Direct LDL reflex    Collection Time: 11/01/23  7:25 AM   Result Value Ref Range    Cholesterol 152 See Comment mg/dL    Triglycerides 62 See Comment mg/dL    HDL, Direct 59 >=50 mg/dL    LDL Calculated 81 0 - 100 mg/dL         Para Corpus, DO

## 2023-11-09 ENCOUNTER — TELEPHONE (OUTPATIENT)
Dept: NEUROLOGY | Facility: CLINIC | Age: 88
End: 2023-11-09

## 2023-12-01 ENCOUNTER — OFFICE VISIT (OUTPATIENT)
Dept: FAMILY MEDICINE CLINIC | Facility: CLINIC | Age: 88
End: 2023-12-01
Payer: MEDICARE

## 2023-12-01 ENCOUNTER — APPOINTMENT (OUTPATIENT)
Dept: RADIOLOGY | Facility: CLINIC | Age: 88
End: 2023-12-01
Payer: MEDICARE

## 2023-12-01 VITALS
BODY MASS INDEX: 25.65 KG/M2 | DIASTOLIC BLOOD PRESSURE: 68 MMHG | WEIGHT: 127 LBS | RESPIRATION RATE: 18 BRPM | TEMPERATURE: 97.1 F | HEART RATE: 98 BPM | SYSTOLIC BLOOD PRESSURE: 122 MMHG

## 2023-12-01 DIAGNOSIS — R05.3 CHRONIC COUGH: ICD-10-CM

## 2023-12-01 DIAGNOSIS — R41.89 COGNITIVE DECLINE: Primary | ICD-10-CM

## 2023-12-01 PROCEDURE — 99213 OFFICE O/P EST LOW 20 MIN: CPT | Performed by: FAMILY MEDICINE

## 2023-12-01 PROCEDURE — 71046 X-RAY EXAM CHEST 2 VIEWS: CPT

## 2023-12-01 RX ORDER — DONEPEZIL HYDROCHLORIDE 10 MG/1
10 TABLET, FILM COATED ORAL
Qty: 90 TABLET | Refills: 1 | Status: SHIPPED | OUTPATIENT
Start: 2023-12-01 | End: 2024-05-29

## 2023-12-01 NOTE — PROGRESS NOTES
Assessment/Plan:    1. Cognitive decline  -     donepezil (ARICEPT) 10 mg tablet; Take 1 tablet (10 mg total) by mouth daily at bedtime    2. Chronic cough  -     XR chest pa & lateral; Future; Expected date: 12/01/2023    Will increase the aricept  Pt seems to be doing well no side affects. There are no Patient Instructions on file for this visit. Return for regular follow up. Subjective:      Patient ID: Fred Mccoy is a 80 y.o. female. Chief Complaint   Patient presents with   • Follow-up     1 month f/u clarification on when to take medication donepezil  HK CMA        Pt is here to follow up the response of the meds  for memory  They are taking it at dinner    No side affects. Pt being seen with daughter  Pt still asking the same question back to backl  Bno neagtive responses. They do have an appt with neurology on the 15    Daughter states pt has been having a lot of coughing that she has been doing for years. Pt has been on allergy meds. States all of a sudden she will start choking on something. No issue with swallowing food or water.           The following portions of the patient's history were reviewed and updated as appropriate: allergies, current medications, past family history, past medical history, past social history, past surgical history and problem list.    Review of Systems   Psychiatric/Behavioral:          Poor memory         Current Outpatient Medications   Medication Sig Dispense Refill   • acetaminophen (TYLENOL) 325 mg tablet Take 3 tablets (975 mg total) by mouth every 8 (eight) hours (Patient taking differently: Take 325 mg by mouth every 6 (six) hours as needed) 30 tablet 0   • amLODIPine (NORVASC) 5 mg tablet TAKE ONE TABLET BY MOUTH EVERY DAY 90 tablet 3   • ascorbic acid (VITAMIN C) 500 mg tablet Take 500 mg by mouth daily with lunch     • aspirin (ECOTRIN LOW STRENGTH) 81 mg EC tablet Take 1 tablet (81 mg total) by mouth daily (Patient taking differently: Take 81 mg by mouth daily at bedtime)  0   • Biotin 1 MG CAPS Take by mouth daily at bedtime       • cholecalciferol (VITAMIN D3) 1,000 units tablet Take 1,000 Units by mouth daily with lunch       • donepezil (ARICEPT) 10 mg tablet Take 1 tablet (10 mg total) by mouth daily at bedtime 90 tablet 1   • losartan (COZAAR) 100 MG tablet TAKE ONE TABLET BY MOUTH EVERY DAY 90 tablet 3   • LUMIGAN 0.01 % ophthalmic drops Administer 1 drop to both eyes daily at bedtime      • metoprolol succinate (TOPROL-XL) 25 mg 24 hr tablet TAKE ONE TABLET BY MOUTH EVERY DAY 90 tablet 3   • Multiple Vitamins-Minerals (PRESERVISION AREDS PO) Take by mouth     • spironolactone (ALDACTONE) 25 mg tablet TAKE ONE TABLET BY MOUTH EVERY DAY 90 tablet 3   • timolol (TIMOPTIC-XE) 0.5 % ophthalmic gel-forming Administer to both eyes every morning         No current facility-administered medications for this visit. Facility-Administered Medications Ordered in Other Visits   Medication Dose Route Frequency Provider Last Rate Last Admin   • ondansetron (ZOFRAN) injection 4 mg  4 mg Intravenous Once PRN Taylor Ribeiro MD           Objective:    /68   Pulse 98   Temp (!) 97.1 °F (36.2 °C)   Resp 18   Wt 57.6 kg (127 lb)   BMI 25.65 kg/m²        Physical Exam  Vitals and nursing note reviewed. Constitutional:       General: She is not in acute distress. Appearance: She is well-developed. She is not diaphoretic. HENT:      Head: Normocephalic and atraumatic. Right Ear: External ear normal.      Left Ear: External ear normal.      Nose: Nose normal.      Mouth/Throat:      Pharynx: No oropharyngeal exudate. Eyes:      General: No scleral icterus. Right eye: No discharge. Left eye: No discharge. Pupils: Pupils are equal, round, and reactive to light. Neck:      Thyroid: No thyromegaly. Cardiovascular:      Rate and Rhythm: Normal rate. Heart sounds: Normal heart sounds. No murmur heard.   Pulmonary: Effort: Pulmonary effort is normal. No respiratory distress. Breath sounds: Normal breath sounds. No wheezing. Abdominal:      General: Bowel sounds are normal. There is no distension. Palpations: Abdomen is soft. There is no mass. Tenderness: There is no abdominal tenderness. There is no guarding or rebound. Musculoskeletal:         General: Normal range of motion. Skin:     General: Skin is warm and dry. Findings: No erythema or rash. Neurological:      Mental Status: She is alert.       Coordination: Coordination normal.      Deep Tendon Reflexes: Reflexes normal.   Psychiatric:         Behavior: Behavior normal.                Merari Pulse, DO

## 2023-12-07 ENCOUNTER — OFFICE VISIT (OUTPATIENT)
Age: 88
End: 2023-12-07
Payer: MEDICARE

## 2023-12-07 ENCOUNTER — TELEPHONE (OUTPATIENT)
Dept: NEUROLOGY | Facility: CLINIC | Age: 88
End: 2023-12-07

## 2023-12-07 VITALS
RESPIRATION RATE: 16 BRPM | HEIGHT: 59 IN | WEIGHT: 127 LBS | BODY MASS INDEX: 25.6 KG/M2 | HEART RATE: 74 BPM | DIASTOLIC BLOOD PRESSURE: 77 MMHG | SYSTOLIC BLOOD PRESSURE: 132 MMHG

## 2023-12-07 DIAGNOSIS — M79.672 PAIN IN BOTH FEET: ICD-10-CM

## 2023-12-07 DIAGNOSIS — L84 CORNS: ICD-10-CM

## 2023-12-07 DIAGNOSIS — I70.209 PERIPHERAL ARTERIOSCLEROSIS (HCC): Primary | ICD-10-CM

## 2023-12-07 DIAGNOSIS — M79.671 PAIN IN BOTH FEET: ICD-10-CM

## 2023-12-07 PROCEDURE — 11056 PARNG/CUTG B9 HYPRKR LES 2-4: CPT | Performed by: PODIATRIST

## 2023-12-07 NOTE — PROGRESS NOTES
Assessment/Plan:  Pain. Metatarsalgia. Peripheral artery disease. Bunion formation. Callus formation. 6 plantar lesions noted. Mycotic toenail. Clawtoe     Plan. Chart reviewed. Foot exam performed. Patient educated on conditions. All mycotic nails debrided. Plantar lesions debrided as well. Patient and family given aftercare instruction. Return for follow-up. Subjective:  Patient complains of pain in her feet. Patient has pain with ambulation. No history of trauma. She has pain with shoe wear. Allergies   Allergen Reactions    Cephalexin              Current Outpatient Prescriptions:     metoprolol tartrate (LOPRESSOR) 50 mg tablet, Take 50 mg by mouth 2 (two) times a day, Disp: , Rfl:     amLODIPine (NORVASC) 5 mg tablet, Take 5 mg by mouth daily, Disp: , Rfl:     aspirin 325 mg tablet, Take 325 mg by mouth daily, Disp: , Rfl:     atorvastatin (LIPITOR) 10 mg tablet, Take 10 mg by mouth daily, Disp: , Rfl:     losartan (COZAAR) 50 mg tablet, , Disp: , Rfl:     LUMIGAN 0.01 % ophthalmic drops, , Disp: , Rfl:                Patient ID: Rosy Jama is a 80 y.o. female. HPI     The following portions of the patient's history were reviewed and updated as appropriate: allergies, current medications, past family history, past medical history, past social history, past surgical history and problem list.     Review of Systems       Objective:  Patient's shoes and socks removed. Foot Exam     General  General Appearance: appears stated age and healthy   Orientation: alert and oriented to person, place, and time   Affect: appropriate   Gait: antalgic         Right Foot/Ankle      Inspection and Palpation  Ecchymosis: none  Tenderness: metatarsals   Swelling: metatarsals   Arch: pes planus  Hammertoes: fifth toe.  234 toes of the right foot are clawed. Hallux valgus: yes  Hallux limitus: yes  Skin Exam: callus;       Neurovascular  Dorsalis pedis: 1+  Posterior tibial: 1+  Superficial peroneal nerve sensation: diminished  Deep peroneal nerve sensation: diminished  Sural nerve sensation: diminished  Achilles reflex: 2+     Muscle Strength  Ankle dorsiflexion: 4+        Left Foot/Ankle       Inspection and Palpation  Tenderness: metatarsals   Swelling: metatarsals   Arch: pes planus  Hammertoes: fifth toe. Toes 234 are clawed a period  Hallux valgus: yes  Hallux limitus: yes     Neurovascular  Dorsalis pedis: 1+  Superficial peroneal nerve sensation: diminished  Deep peroneal nerve sensation: diminished  Sural nerve sensation: diminished  Achilles reflex: 2+     Muscle Strength  Ankle dorsiflexion: 4+        Physical Exam   Constitutional: She appears well-developed and well-nourished. Cardiovascular: Normal rate and regular rhythm. Pulses:       Dorsalis pedis pulses are 1+ on the right side, and 1+ on the left side. Posterior tibial pulses are 1+ on the right side. Q 9 findings noted bilateral.  Negative digital hair noted. Positive abnormal cooling. Feet:   Right Foot:   Skin Integrity: Positive for callus. Neurological:   Reflex Scores:       Achilles reflexes are 2+ on the right side and 2+ on the left side. Skin:   Tylenol submetatarsal 2 noted. This is bilateral.    Severe thickened mycotic toenail noted bilateral.  Nails are yellow with debris. Positive malodor noted      Distal clavi by 2nd 3rd and 4th toes of the left foot. Nursing note and vitals reviewed.

## 2023-12-12 ENCOUNTER — CONSULT (OUTPATIENT)
Dept: NEUROLOGY | Facility: CLINIC | Age: 88
End: 2023-12-12
Payer: MEDICARE

## 2023-12-12 ENCOUNTER — TELEPHONE (OUTPATIENT)
Dept: NEUROLOGY | Facility: CLINIC | Age: 88
End: 2023-12-12

## 2023-12-12 VITALS
HEIGHT: 59 IN | WEIGHT: 127 LBS | DIASTOLIC BLOOD PRESSURE: 62 MMHG | HEART RATE: 84 BPM | BODY MASS INDEX: 25.6 KG/M2 | TEMPERATURE: 96.7 F | SYSTOLIC BLOOD PRESSURE: 112 MMHG

## 2023-12-12 DIAGNOSIS — I44.0 HEART BLOCK, FIRST DEGREE: ICD-10-CM

## 2023-12-12 DIAGNOSIS — Q04.6 COLLOID CYST OF BRAIN (HCC): ICD-10-CM

## 2023-12-12 DIAGNOSIS — R41.3 MEMORY IMPAIRMENT: ICD-10-CM

## 2023-12-12 DIAGNOSIS — R41.89 COGNITIVE DECLINE: Primary | ICD-10-CM

## 2023-12-12 PROCEDURE — 99204 OFFICE O/P NEW MOD 45 MIN: CPT | Performed by: PSYCHIATRY & NEUROLOGY

## 2023-12-12 NOTE — ASSESSMENT & PLAN NOTE
81 yo F with paroxysmal A-fib, sinus luis angel-tachy syndrome, hx of first degree heart block, macular degeneration with limited vision b/l, HTN, gluacoma, s/p aortic valve replacement around 81 yo, CKD 3a,  is here for initial evaluation of memory issues. MOCA 19/30 (deficits 2 points in visuospatial/executive, 2 in attention, 1 in abstraction, 4 in delayed recall and 2 in orientation)    Impression: Mild cognitive impairment (age might be a contributing factor). Will rule out reversible causes. Plan:  -Discussed plan with neurology attending, Dr. Darlene Markham. Please refer to the attestation for additional recommendation and plan  -Will get Vitamin B12, folate, and TSH  -Update CT head wo contrast to re-assess for colloid cyst, any other new changes, or hydrocephalus   -Recommend reaching out to Cardiologist Dr. Jorge Gomez if she can continue Aricept since it can affect on the heart rate and she has hx of first degree heart block. Will send the consult note to Dr. Wendy Torres (PCP) and Dr. Jorge Gomez (cardiology) as well. -Will not get Neuropsych testing for now.  Will consider if it progresses at the next appointment  -Patient and her daughter would like to defer MRI brain neuroquant for now    Follow up in about 6 months to re-assess and review the results

## 2023-12-12 NOTE — PROGRESS NOTES
Patient ID: Rylie Og is a 80 y.o. female. Assessment/Plan:    Memory impairment  81 yo F with paroxysmal A-fib, sinus luis angel-tachy syndrome, hx of first degree heart block, macular degeneration with limited vision b/l, HTN, gluacoma, s/p aortic valve replacement around 81 yo, CKD 3a,  is here for initial evaluation of memory issues. MOCA 19/30 (deficits 2 points in visuospatial/executive, 2 in attention, 1 in abstraction, 4 in delayed recall and 2 in orientation)    Impression: Mild cognitive impairment (age might be a contributing factor). Will rule out reversible causes. Plan:  -Discussed plan with neurology attending, Dr. Gray Faustin. Please refer to the attestation for additional recommendation and plan  -Will get Vitamin B12, folate, and TSH  -Update CT head wo contrast to re-assess for colloid cyst, any other new changes, or hydrocephalus   -Recommend reaching out to Cardiologist Dr. Genet Grant if she can continue Aricept since it can affect on the heart rate and she has hx of first degree heart block. Will send the consult note to Dr. Carson Davis (PCP) and Dr. Genet Grant (cardiology) as well. -Will not get Neuropsych testing for now. 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 Stable 4 mm colloid cyst in the roof of the 3rd ventricle. No hydrocephalus. 2.  No acute intracranial hemorrhage. 3. Enlarging left supraorbital and periorbital hematoma without skull fracture.   4.  Interval reduction of left TMJ dislocation    Update CT head wo to rule out any new changes or hydrocephalus    Heart block, first degree  Recommend reaching out to Cardiologist Dr. Genet Grant if patient can continue taking Aricept since it can slow down heart rate     HR at the clinic today is 84       Diagnoses and all orders for this visit:    Cognitive decline  -     Ambulatory Referral to Neurology  -     Vitamin B12/Folate, Serum Panel; Future  -     TSH, 3rd generation with Free T4 reflex; Future    Memory impairment  -     TSH, 3rd generation with Free T4 reflex; Future    Colloid cyst of brain (HCC)  -     CT head wo contrast; Future    Heart block, first degree         Subjective:    81 yo F with paroxysmal A-fib, sinus luis angel-tachy syndrome, hx of first degree heart block, macular degeneration with limited vision b/l, HTN, gluacoma, s/p aortic valve replacement around 81 yo, CKD 3a is here for initial evaluation of memory issues accompanied by her daughter Broderick Lynch. Patient lives with her daughter Broderick Lynch and son-in-law. Moved in with them about 3 years ago from [facility].  is residing at the nursing home. Several months ago, patient's daughter noticed that the patient forgets names more. One time, the patient thought her daughter ans son-in-law had a conversation that they were going to sell the house. However, that conversation never happened. At one point, she falsely thought her son-in-law was mad at her. She asks the same questions over and over. She does not remember who the doctors are before she sees them. She is independent with ADLs. Due to limitation with vision, some difficulties with iADLs: using remote control, cooking, writing, checks. She still manages money in her wallet. She has not been driving for a decade. Up until about a month ago, the daughter started preparing the medications. Her mood is good. She cleans the house, makes the bed, washes dishes, and does laundry. She does not need to use a cane or  walker. She has a living will. Her 3 Children (2 daughters and a son) are POA. Around the beginning of Nov, 2023, PCP started Aricept 5mg daily and on Dec 10, 2023 Aricept 10mg daily at dinner time. No side effects with it. No hallucination. No diarrhea.     Fam hx: patient's brother and his daughter have memory issues    She got the following CT head due to fall 2 years ago. She missed the curb and fell. 12/09/2020 CT head wo  1. Stable 4 mm colloid cyst in the roof of the 3rd ventricle. No hydrocephalus. 2.  No acute intracranial hemorrhage. 3. Enlarging left supraorbital and periorbital hematoma without skull fracture. 4.  Interval reduction of left TMJ dislocation. The following portions of the patient's history were reviewed and updated as appropriate: allergies, current medications, past family history, past medical history, past social history, past surgical history, and problem list.      Objective:    Blood pressure 112/62, pulse 84, temperature (!) 96.7 °F (35.9 °C), height 4' 11" (1.499 m), weight 57.6 kg (127 lb). Physical Exam  Constitutional:       General: She is not in acute distress. HENT:      Head: Normocephalic and atraumatic. Nose: Nose normal.      Mouth/Throat:      Mouth: Mucous membranes are moist.      Pharynx: Oropharynx is clear. No oropharyngeal exudate or posterior oropharyngeal erythema. Eyes:      General: Lids are normal.      Extraocular Movements: Extraocular movements intact. Pupils: Pupils are equal, round, and reactive to light. Cardiovascular:      Rate and Rhythm: Normal rate. Pulses: Normal pulses. Pulmonary:      Effort: Pulmonary effort is normal. No respiratory distress. Musculoskeletal:         General: Normal range of motion. Cervical back: Normal range of motion. Skin:     General: Skin is warm and dry. Neurological:      General: No focal deficit present. Mental Status: She is alert. Motor: Motor strength is normal.     Coordination: Romberg sign negative. Deep Tendon Reflexes:      Reflex Scores:       Tricep reflexes are 2+ on the right side and 2+ on the left side. Bicep reflexes are 2+ on the right side and 2+ on the left side.        Brachioradialis reflexes are 2+ on the right side and 2+ on the left side. Patellar reflexes are 2+ on the right side and 2+ on the left side. Achilles reflexes are 2+ on the right side and 2+ on the left side. Psychiatric:         Mood and Affect: Mood normal.         Speech: Speech normal.         Neurological Exam  Mental Status  Alert. Speech is normal. Language is fluent with no aphasia. MOCA 19/30 (deficits 2 points in visuospatial/executive, 2 in attention, 1 in abstraction, 4 in delayed recall and 2 in orientation). Cranial Nerves  CN II: Visual acuity is normal. Visual fields full to confrontation. Right funduscopic exam: not visualized. Left funduscopic exam: not visualized. CN III, IV, VI: Extraocular movements intact bilaterally. Normal lids and orbits bilaterally. Pupils equal round and reactive to light bilaterally. CN V: Facial sensation is normal.  CN VII: Full and symmetric facial movement. CN VIII: Hearing is normal.  CN IX, X: Palate elevates symmetrically  CN XI: Shoulder shrug strength is normal.  CN XII: Tongue midline without atrophy or fasciculations. Motor  Normal muscle bulk throughout. No fasciculations present. Normal muscle tone. No abnormal involuntary movements. Strength is 5/5 throughout all four extremities. Sensory  Sensation is intact to light touch, pinprick, vibration and proprioception in all four extremities. Reflexes                                            Right                      Left  Brachioradialis                    2+                         2+  Biceps                                 2+                         2+  Triceps                                2+                         2+  Patellar                                2+                         2+  Achilles                                2+                         2+    Coordination  Right: Finger-to-nose normal.Left: Finger-to-nose normal.    Gait  Casual gait is normal including stance, stride, and arm swing. Romberg is absent.       ROS: Review of Systems   Constitutional:  Negative for appetite change, fatigue and fever. HENT: Negative. Negative for hearing loss, tinnitus, trouble swallowing and voice change. Eyes: Negative. Negative for photophobia, pain and visual disturbance. Respiratory: Negative. Negative for shortness of breath. Cardiovascular: Negative. Negative for palpitations. Gastrointestinal: Negative. Negative for nausea and vomiting. Endocrine: Negative. Negative for cold intolerance. Genitourinary: Negative. Negative for dysuria, frequency and urgency. Musculoskeletal:  Negative for back pain, gait problem, myalgias and neck pain. Skin: Negative. Negative for rash. Allergic/Immunologic: Negative. Neurological: Negative. Negative for dizziness, tremors, seizures, syncope, facial asymmetry, speech difficulty, weakness, light-headedness, numbness and headaches. Hematological: Negative. Does not bruise/bleed easily. Psychiatric/Behavioral:  Positive for confusion. Negative for hallucinations and sleep disturbance. STM started last couple of months   All other systems reviewed and are negative.     I personally reviewed the ROS that was entered by the medical assistant

## 2023-12-12 NOTE — TELEPHONE ENCOUNTER
Please see neuro consult from first visit today. Pt under eval for dementia. Currently already on 10mg aricept hs. Due to history of first degree heart block , luis angel/tachy syndrome and afib- wanted to check with both pcp and cardiology if any issue with pt staying on the aricept from cardiac standpoint. Ccing Dr Carson Davis and Dr Genet Grant.

## 2023-12-12 NOTE — ASSESSMENT & PLAN NOTE
Recommend reaching out to Cardiologist Dr. Lindsey Choudhary if patient can continue taking Aricept since it can slow down heart rate     HR at the clinic today is 80

## 2023-12-12 NOTE — PATIENT INSTRUCTIONS
Please get blood work no need for fasting and CT head without contrast     Follow up in about 6 months

## 2023-12-12 NOTE — PROGRESS NOTES
Patient ID: Caron Orta is a 80 y.o. female. Assessment/Plan:    No problem-specific Assessment & Plan notes found for this encounter. {Assess/PlanSmartLinks:74067}       Subjective:    HPI    {[facility] Neurology HPI texts:45220}    {Common ambulatory SmartLinks:04069}         Objective: There were no vitals taken for this visit. Physical Exam    Neurological Exam      ROS:    Review of Systems   Constitutional:  Negative for appetite change, fatigue and fever. HENT: Negative. Negative for hearing loss, tinnitus, trouble swallowing and voice change. Eyes: Negative. Negative for photophobia, pain and visual disturbance. Respiratory: Negative. Negative for shortness of breath. Cardiovascular: Negative. Negative for palpitations. Gastrointestinal: Negative. Negative for nausea and vomiting. Endocrine: Negative. Negative for cold intolerance. Genitourinary: Negative. Negative for dysuria, frequency and urgency. Musculoskeletal:  Negative for back pain, gait problem, myalgias and neck pain. Skin: Negative. Negative for rash. Allergic/Immunologic: Negative. Neurological: Negative. Negative for dizziness, tremors, seizures, syncope, facial asymmetry, speech difficulty, weakness, light-headedness, numbness and headaches. Hematological: Negative. Does not bruise/bleed easily. Psychiatric/Behavioral:  Positive for confusion. Negative for hallucinations and sleep disturbance. STM started last couple of months   All other systems reviewed and are negative.

## 2023-12-12 NOTE — ASSESSMENT & PLAN NOTE
12/09/2020 CT head wo  1. Stable 4 mm colloid cyst in the roof of the 3rd ventricle. No hydrocephalus. 2.  No acute intracranial hemorrhage. 3. Enlarging left supraorbital and periorbital hematoma without skull fracture.   4.  Interval reduction of left TMJ dislocation    Update CT head wo to rule out any new changes or hydrocephalus

## 2023-12-27 ENCOUNTER — TELEPHONE (OUTPATIENT)
Dept: NEUROLOGY | Facility: CLINIC | Age: 88
End: 2023-12-27

## 2023-12-27 NOTE — TELEPHONE ENCOUNTER
12/27 at 2:06 pm:    Hi, my mom is Rea Huizar.  She saw Dr. Fournier on December 12th. My mom's birthday is [birth year]. Dr. Fournier had expressed some concern with some medicine that she was on, the aricept, with her heart condition. I contacted her cardiologist, who said yes, she should be off of that medicine and I'm wanting to know what else we might be able to give her for the cognitive decline. If you could give me a call, I would appreciate it. CB# [phone]. My name is Delfina Jett.  ____________________________________________________________________________    Routed to provider to advise.

## 2023-12-29 DIAGNOSIS — R41.3 MEMORY IMPAIRMENT: ICD-10-CM

## 2023-12-29 DIAGNOSIS — R41.89 COGNITIVE DECLINE: Primary | ICD-10-CM

## 2023-12-29 NOTE — TELEPHONE ENCOUNTER
"Spoke with pt's daughter, Delfina, and made her aware of provider's response. Delfina requested to ask Dr. Fournier who is \"[facility]? She doesn't recall a referral to another provider's office.   "

## 2023-12-29 NOTE — TELEPHONE ENCOUNTER
Echo Fournier MD  You2 days ago     MA  Hello,  Another option is to try Memantine. Not sure patient has an appointment scheduled with [facility] already. If not, I recommend seeing senior care first to get their recommendations.  Thank you,  Echo Fournier

## 2024-01-02 ENCOUNTER — TELEPHONE (OUTPATIENT)
Age: 89
End: 2024-01-02

## 2024-01-02 ENCOUNTER — TELEPHONE (OUTPATIENT)
Dept: FAMILY MEDICINE CLINIC | Facility: CLINIC | Age: 89
End: 2024-01-02

## 2024-01-02 NOTE — TELEPHONE ENCOUNTER
Echo Fournier MD  You4 days ago     MA  Hello,  I have put in [facility] referral for now. It was not ordered previously.  My apology.    Echo Fournier.

## 2024-01-02 NOTE — TELEPHONE ENCOUNTER
----- Message from Frank Lombardi, DO sent at 1/2/2024  8:12 AM EST -----  Regarding: meds  Please call pt.  Pt will need to stop her Aricept.  I will dc from her med list.  It will no longer be compatible with her med list.  It can affect the activity of her cardiac meds.  In her case the Cardiac meds are more important.

## 2024-01-04 ENCOUNTER — HOSPITAL ENCOUNTER (OUTPATIENT)
Dept: RADIOLOGY | Facility: HOSPITAL | Age: 89
Discharge: HOME | End: 2024-01-04
Payer: MEDICARE

## 2024-01-04 DIAGNOSIS — Q04.6 COLLOID CYST OF BRAIN (HCC): ICD-10-CM

## 2024-01-04 PROCEDURE — 70450 CT HEAD/BRAIN W/O DYE: CPT

## 2024-01-04 PROCEDURE — G1004 CDSM NDSC: HCPCS

## 2024-01-09 ENCOUNTER — APPOINTMENT (OUTPATIENT)
Dept: LAB | Facility: CLINIC | Age: 89
End: 2024-01-09
Payer: MEDICARE

## 2024-01-09 DIAGNOSIS — R41.89 COGNITIVE DECLINE: ICD-10-CM

## 2024-01-09 DIAGNOSIS — R41.3 MEMORY IMPAIRMENT: ICD-10-CM

## 2024-01-09 LAB
FOLATE SERPL-MCNC: >22.3 NG/ML
TSH SERPL DL<=0.05 MIU/L-ACNC: 1.85 UIU/ML (ref 0.45–4.5)
VIT B12 SERPL-MCNC: 747 PG/ML (ref 180–914)

## 2024-01-09 PROCEDURE — 82607 VITAMIN B-12: CPT

## 2024-01-09 PROCEDURE — 82746 ASSAY OF FOLIC ACID SERUM: CPT

## 2024-01-09 PROCEDURE — 84443 ASSAY THYROID STIM HORMONE: CPT

## 2024-01-10 ENCOUNTER — TELEPHONE (OUTPATIENT)
Dept: NEUROLOGY | Facility: CLINIC | Age: 89
End: 2024-01-10

## 2024-01-10 ENCOUNTER — TELEPHONE (OUTPATIENT)
Dept: FAMILY MEDICINE CLINIC | Facility: CLINIC | Age: 89
End: 2024-01-10

## 2024-01-10 DIAGNOSIS — E78.2 MIXED HYPERLIPIDEMIA: Primary | ICD-10-CM

## 2024-01-10 RX ORDER — ATORVASTATIN CALCIUM 20 MG/1
20 TABLET, FILM COATED ORAL DAILY
Qty: 90 TABLET | Refills: 3 | Status: SHIPPED | OUTPATIENT
Start: 2024-01-10

## 2024-01-10 NOTE — TELEPHONE ENCOUNTER
Talked to patient's caregiver daughter Delfina Jett regarding the CTH results.     -Recommend to follow up with PCP for stroke risk factor reduction. Patient will have follow up appointment with PCP in May   -[facility] appointment in Feb and March  -Recommend the daughter to discuss with [facility] if Memantine use will be beneficial for the patient   -The daughter does not notice TMJ issue. Recommend to let PCP know if new changes  -Answered all her questions to the satisfaction. She will explain the plans to the patient as well

## 2024-01-10 NOTE — TELEPHONE ENCOUNTER
Spoke with daughter informed of the dislocation - no pain angel symptoms - will observe.   Pt started on a statin for risk reduction, labs ordered

## 2024-01-10 NOTE — TELEPHONE ENCOUNTER
----- Message from Echo Fournier MD sent at 1/10/2024  6:17 PM EST -----  Yes, Dr. Velasquez. I have called the patient and her daughter to make aware of all the results. Patient is already on Aspirin, but not on statin. LDL in Nov was 81. They will follow up with Dr. Lombardi in May and will defer CVA risk factors management to Dr. Lombardi. [facility] care appointment in March (I would like them to see [facility] and let them decide if Memantine will be beneficial for the patient).     Thank you,   Echo Keene.    ----- Message -----  From: Sabrina Velasquez MD  Sent: 1/10/2024  11:29 AM EST  To: Frank Lombardi, DO; MD Echo Hermosillo,     Please make sure pt and daughter are aware of all results of study as comparision from 2020.  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  Pt seeing you for memory issues, please also remind pt to follow up with pcp for cva risk factors as well.   Further direction for tmj finding per pcp as well.  Pt was asymptomatic at time of evaluation.  Thanks,  Dr Velasquez  ----- Message -----  From: Echo Fournier MD  Sent: 1/10/2024  10:48 AM EST  To: Frank Lombardi, DO; Sabrina Velasquez MD    [facility] Dr. Lombardi,   We repeated CT head to access for her colloid cyst's stability. It is stable. There is new anterior dislocation of L temporomandibular joint (new compared to 2020 CT). Patient did not complain of any jaw pain or issue when we saw the patient. Would like to let you know if further management is needed.     Thank you,   Echo Fournier  Neurology resident    ----- Message -----  From: Interface, Radiology Results In  Sent: 1/10/2024   6:05 AM EST  To: Echo Fournier MD

## 2024-02-22 ENCOUNTER — OFFICE VISIT (OUTPATIENT)
Age: 89
End: 2024-02-22
Payer: MEDICARE

## 2024-02-22 VITALS
WEIGHT: 128 LBS | SYSTOLIC BLOOD PRESSURE: 133 MMHG | HEIGHT: 59 IN | DIASTOLIC BLOOD PRESSURE: 91 MMHG | HEART RATE: 84 BPM | BODY MASS INDEX: 25.8 KG/M2 | RESPIRATION RATE: 16 BRPM

## 2024-02-22 DIAGNOSIS — M79.671 PAIN IN BOTH FEET: ICD-10-CM

## 2024-02-22 DIAGNOSIS — L84 CORNS: ICD-10-CM

## 2024-02-22 DIAGNOSIS — I70.209 PERIPHERAL ARTERIOSCLEROSIS (HCC): Primary | ICD-10-CM

## 2024-02-22 DIAGNOSIS — M79.672 PAIN IN BOTH FEET: ICD-10-CM

## 2024-02-22 PROCEDURE — 11056 PARNG/CUTG B9 HYPRKR LES 2-4: CPT | Performed by: PODIATRIST

## 2024-02-22 NOTE — PROGRESS NOTES
Assessment/Plan:  Pain.  Metatarsalgia.  Peripheral artery disease.  Bunion formation.  Callus formation.  6 plantar lesions noted.  Mycotic toenail.  Clawtoe     Plan.  Chart reviewed.  Foot exam performed.  Patient educated on conditions.  All mycotic nails debrided.  Plantar lesions debrided as well.  Patient and family given aftercare instruction.  Return for follow-up.     Subjective:  Patient complains of pain in her feet.  Patient has pain with ambulation.  No history of trauma.  She has pain with shoe wear.                Allergies   Allergen Reactions    Cephalexin              Current Outpatient Prescriptions:     metoprolol tartrate (LOPRESSOR) 50 mg tablet, Take 50 mg by mouth 2 (two) times a day, Disp: , Rfl:     amLODIPine (NORVASC) 5 mg tablet, Take 5 mg by mouth daily, Disp: , Rfl:     aspirin 325 mg tablet, Take 325 mg by mouth daily, Disp: , Rfl:     atorvastatin (LIPITOR) 10 mg tablet, Take 10 mg by mouth daily, Disp: , Rfl:     losartan (COZAAR) 50 mg tablet, , Disp: , Rfl:     LUMIGAN 0.01 % ophthalmic drops, , Disp: , Rfl:                Patient ID: Rea Huizar is a 88 y.o. female.     HPI     The following portions of the patient's history were reviewed and updated as appropriate: allergies, current medications, past family history, past medical history, past social history, past surgical history and problem list.     Review of Systems       Objective:  Patient's shoes and socks removed.   Foot Exam     General  General Appearance: appears stated age and healthy   Orientation: alert and oriented to person, place, and time   Affect: appropriate   Gait: antalgic         Right Foot/Ankle      Inspection and Palpation  Ecchymosis: none  Tenderness: metatarsals   Swelling: metatarsals   Arch: pes planus  Hammertoes: fifth toe.  234 toes of the right foot are clawed.  Hallux valgus: yes  Hallux limitus: yes  Skin Exam: callus;      Neurovascular  Dorsalis pedis: 1+  Posterior tibial:  1+  Superficial peroneal nerve sensation: diminished  Deep peroneal nerve sensation: diminished  Sural nerve sensation: diminished  Achilles reflex: 2+     Muscle Strength  Ankle dorsiflexion: 4+        Left Foot/Ankle       Inspection and Palpation  Tenderness: metatarsals   Swelling: metatarsals   Arch: pes planus  Hammertoes: fifth toe.  Toes 234 are clawed a period  Hallux valgus: yes  Hallux limitus: yes     Neurovascular  Dorsalis pedis: 1+  Superficial peroneal nerve sensation: diminished  Deep peroneal nerve sensation: diminished  Sural nerve sensation: diminished  Achilles reflex: 2+     Muscle Strength  Ankle dorsiflexion: 4+        Physical Exam   Constitutional: She appears well-developed and well-nourished.   Cardiovascular: Normal rate and regular rhythm.    Pulses:       Dorsalis pedis pulses are 1+ on the right side, and 1+ on the left side.        Posterior tibial pulses are 1+ on the right side.   Q 9 findings noted bilateral.  Negative digital hair noted.  Positive abnormal cooling.   Feet:   Right Foot:   Skin Integrity: Positive for callus.   Neurological:   Reflex Scores:       Achilles reflexes are 2+ on the right side and 2+ on the left side.  Skin:   Tylenol submetatarsal 2 noted.  This is bilateral.    Severe thickened mycotic toenail noted bilateral.  Nails are yellow with debris.  Positive malodor noted      Distal clavi by 2nd 3rd and 4th toes of the left foot.  Nursing note and vitals reviewed.

## 2024-03-19 ENCOUNTER — OFFICE VISIT (OUTPATIENT)
Dept: CARDIOLOGY CLINIC | Facility: CLINIC | Age: 89
End: 2024-03-19
Payer: MEDICARE

## 2024-03-19 VITALS
BODY MASS INDEX: 25.65 KG/M2 | HEART RATE: 70 BPM | WEIGHT: 127 LBS | SYSTOLIC BLOOD PRESSURE: 96 MMHG | DIASTOLIC BLOOD PRESSURE: 64 MMHG | OXYGEN SATURATION: 99 %

## 2024-03-19 DIAGNOSIS — I10 ESSENTIAL HYPERTENSION: ICD-10-CM

## 2024-03-19 DIAGNOSIS — N18.30 STAGE 3 CHRONIC KIDNEY DISEASE, UNSPECIFIED WHETHER STAGE 3A OR 3B CKD (HCC): ICD-10-CM

## 2024-03-19 DIAGNOSIS — I49.5 SINUS BRADY-TACHY SYNDROME (HCC): ICD-10-CM

## 2024-03-19 DIAGNOSIS — Z95.2 S/P AORTIC VALVE REPLACEMENT: Primary | ICD-10-CM

## 2024-03-19 DIAGNOSIS — I48.0 PAROXYSMAL ATRIAL FIBRILLATION (HCC): ICD-10-CM

## 2024-03-19 PROCEDURE — 99214 OFFICE O/P EST MOD 30 MIN: CPT | Performed by: INTERNAL MEDICINE

## 2024-03-19 NOTE — PROGRESS NOTES
Cardiology   Franko Lewis DO, [facility]  Damien Del Rio MD, [facility]  Akil Flores MD, [facility]  Kaelyn De La Cruz MD, [facility]  -------------------------------------------------------------------  [facility]  [address], [address], [address]  [place], [place], [address]  [phone]    Cardiology Follow Up  Rea Huizar  1935  [id number]          Assessment/Plan:    1. Aortic valve disease with prior AVR  -Most recent echocardiogram in 20220 showed normal valve function.  Normal valve gradients without any regurgitation.  Repeat echocardiogram ordered.      2. Essential hypertension  -  Blood pressure controlled on current Rx.     - continue losartan 100 mg daily.  -Continue amlodipine 5 mg daily.     - Avoid HCTZ because of hyponatremia.    - Will stop spironolactone because blood pressure is low today and she has recently resumed taking all medications.  She will monitor at home and call if blood pressure increases.    3. Paroxysmal atrial fibrillation  - Rate is stable   - Not on anticoagulation due to fall risk     4. Sinus luis angel-tachy syndrome (HCC)  - heart rate normal today.     5. CVA/Peripheral arteriosclerosis (HCC)  - continue aspirin  -She had an occipital infarct.  Has only 1 discrete area of infarction.  Likely not embolic.  Discussed anticoagulation risk and benefit.  Will continue to remain off of anticoagulation at this time.  - Tolerating atorvastatin    6. Bilateral leg edema  - resolved with discontinuation of higher dose of amlodipine.  Has been restarted along with increased ARB dose.  No edema present today.     7. Mixed hyperlipidemia  - Crestor was discontinued due to GI side effects.  She is tolerating atorvastatin.     Interval History:     Rea Huizar is 88 y.o. female here for followup of hypertension.    Since her last visit, she was seen by a neurologist because of memory difficulty.  Found to have a new caudate infarct.   She denies  any palpitations, chest pain, shortness of breath, LE edema, orthopnea or PND.   Diet is overall unchanged.  There has not been a significant change in weight.  BP is low today.  She was not taking medications regularly.  She has been started on atorvastatin.  Currently, she is using spironolactone 25 mg daily along with metoprolol 25 mg daily and losartan 100 mg daily.     In November 2020, blood pressure was markedly elevated during that visit and hydrochlorothiazide 25 mg daily was added to her medication regimen.  Previously, she developed lower extremity edema which improved with amlodipine discontinuation.  Since then, blood pressure has been elevated.  She was started on Toprol XL 25 mg daily along with losartan 50 mg daily.      Losartan was increased to 100 mg daily and Hydrochlorothiazide was stopped after developing hyponatremia.  Previously, felodipine 5 mg daily was added because of elevated blood pressure.   She has not had any LE edema but feels it has caused constipation.  She was switched back to amlodipine 5 mg daily.   The patient has a history of aortic valve replacement.  In June 2015, she had aortic valve replacement at [facility] for aortic valve stenosis.  She has no history of coronary artery disease.        Past Medical History:   Diagnosis Date    Aortic valve disease     Arthritis     knees    Bunion     Callus     Chronic kidney disease     stg 3    Glaucoma     Hearing aid worn     bilateral    Heart block, first degree     Heart murmur     Hx of fall 12/2020    laceration of the head    Hyperlipidemia     Hypertension     Macular degeneration     Sinus luis angel-tachy syndrome (HCC)     SSS (sick sinus syndrome) (HCC)     Wears glasses     for reading     Social History     Socioeconomic History    Marital status: /Civil Union     Spouse name: Not on file    Number of children: Not on file    Years of education: Not on file    Highest education level: Not on file    Occupational History    Not on file   Tobacco Use    Smoking status: Never    Smokeless tobacco: Never   Vaping Use    Vaping status: Never Used   Substance and Sexual Activity    Alcohol use: Not Currently    Drug use: Never    Sexual activity: Not on file   Other Topics Concern    Not on file   Social History Narrative    Not on file     Social Determinants of Health     Financial Resource Strain: Low Risk  (2023)    Overall Financial Resource Strain (CARDIA)     Difficulty of Paying Living Expenses: Not hard at all   Food Insecurity: Not on file   Transportation Needs: No Transportation Needs (2023)    PRAPARE - Transportation     Lack of Transportation (Medical): No     Lack of Transportation (Non-Medical): No   Physical Activity: Not on file   Stress: Not on file   Social Connections: Not on file   Intimate Partner Violence: Not on file   Housing Stability: Not on file      Family History   Problem Relation Age of Onset    Heart disease Mother     Hypertension Mother     Diabetes Mother     Heart disease Father     Heart attack Father          at 55    Mental illness Neg Hx      Past Surgical History:   Procedure Laterality Date    AORTIC VALVE REPLACEMENT  2015    Porcine valve    CATARACT EXTRACTION Right     COLONOSCOPY      [place] XCAPSL CTRC RMVL INSJ IO LENS PROSTH W/O ECP Right 2021    Procedure: EXTRACTION EXTRACAPSULAR CATARACT PHACO INTRAOCULAR LENS (IOL);  Surgeon: Chadwick Montague MD;  Location: [facility] MAIN OR;  Service: Ophthalmology    [place] XCAPSL CTRC RMVL INSJ IO LENS PROSTH W/O ECP Left 2022    Procedure: EXTRACTION EXTRACAPSULAR CATARACT PHACO INTRAOCULAR LENS (IOL);  Surgeon: Chadwick Montague MD;  Location: [facility] MAIN OR;  Service: Ophthalmology       Current Outpatient Medications:     acetaminophen (TYLENOL) 325 mg tablet, Take 3 tablets (975 mg total) by mouth every 8 (eight) hours (Patient taking differently: Take 325 mg by mouth every 6 (six) hours as needed), Disp:  30 tablet, Rfl: 0    amLODIPine (NORVASC) 5 mg tablet, TAKE ONE TABLET BY MOUTH EVERY DAY, Disp: 90 tablet, Rfl: 3    ascorbic acid (VITAMIN C) 500 mg tablet, Take 500 mg by mouth daily with lunch, Disp: , Rfl:     aspirin (ECOTRIN LOW STRENGTH) 81 mg EC tablet, Take 1 tablet (81 mg total) by mouth daily (Patient taking differently: Take 81 mg by mouth daily at bedtime), Disp:  , Rfl: 0    atorvastatin (LIPITOR) 20 mg tablet, Take 1 tablet (20 mg total) by mouth daily, Disp: 90 tablet, Rfl: 3    Biotin 1 MG CAPS, Take by mouth daily at bedtime  , Disp: , Rfl:     cholecalciferol (VITAMIN D3) 1,000 units tablet, Take 1,000 Units by mouth daily with lunch  , Disp: , Rfl:     losartan (COZAAR) 100 MG tablet, TAKE ONE TABLET BY MOUTH EVERY DAY, Disp: 90 tablet, Rfl: 3    LUMIGAN 0.01 % ophthalmic drops, Administer 1 drop to both eyes daily at bedtime , Disp: , Rfl:     metoprolol succinate (TOPROL-XL) 25 mg 24 hr tablet, TAKE ONE TABLET BY MOUTH EVERY DAY, Disp: 90 tablet, Rfl: 3    Multiple Vitamins-Minerals (PRESERVISION AREDS PO), Take by mouth, Disp: , Rfl:     spironolactone (ALDACTONE) 25 mg tablet, TAKE ONE TABLET BY MOUTH EVERY DAY, Disp: 90 tablet, Rfl: 3    timolol (TIMOPTIC-XE) 0.5 % ophthalmic gel-forming, Administer to both eyes every morning  , Disp: , Rfl:   No current facility-administered medications for this visit.    Facility-Administered Medications Ordered in Other Visits:     ondansetron (ZOFRAN) injection 4 mg, 4 mg, Intravenous, Once PRN, Srinivasa Bai MD        Review of Systems:  Review of Systems   Respiratory:  Negative for shortness of breath.    Cardiovascular:  Negative for chest pain, palpitations and leg swelling.   Musculoskeletal:  Positive for arthralgias.   All other systems reviewed and are negative.        Physical Exam:  Vitals:  Vitals:    03/19/24 0843   BP: 96/64   BP Location: Right arm   Patient Position: Sitting   Cuff Size: Standard   Pulse: 70   SpO2: 99%   Weight: 57.6 kg  (127 lb)     Physical Exam   Constitutional: She appears healthy. No distress.   Eyes: Pupils are equal, round, and reactive to light. Conjunctivae are normal.   Neck: No JVD present.   Cardiovascular: Normal rate. An irregularly irregular rhythm present. Exam reveals no gallop and no friction rub.   Murmur heard.  Pulmonary/Chest: Effort normal and breath sounds normal. She has no wheezes. She has no rales.   Musculoskeletal:         General: No tenderness, deformity or edema.      Cervical back: Normal range of motion and neck supple.   Neurological: She is alert and oriented to person, place, and time.   Skin: Skin is warm and dry.        Cardiographics:  Last known EF:  55-60% with normal bioprosthetic aortic valve.    This note was completed in part utilizing M-Modal Fluency Direct Software.  Grammatical errors, random word insertions, spelling mistakes, and incomplete sentences can be an occasional consequence of this system secondary to software limitations, ambient noise, and hardware issues.  If you have any questions or concerns about the content, text, or information contained within the body of this dictation, please contact the provider for clarification.

## 2024-03-28 ENCOUNTER — OFFICE VISIT (OUTPATIENT)
Dept: AUDIOLOGY | Facility: CLINIC | Age: 89
End: 2024-03-28
Payer: COMMERCIAL

## 2024-03-28 ENCOUNTER — OFFICE VISIT (OUTPATIENT)
Dept: AUDIOLOGY | Facility: CLINIC | Age: 89
End: 2024-03-28
Payer: MEDICARE

## 2024-03-28 DIAGNOSIS — H90.3 SENSORINEURAL HEARING LOSS (SNHL), BILATERAL: Primary | ICD-10-CM

## 2024-03-28 PROCEDURE — 92567 TYMPANOMETRY: CPT | Performed by: AUDIOLOGIST

## 2024-03-28 PROCEDURE — 92557 COMPREHENSIVE HEARING TEST: CPT | Performed by: AUDIOLOGIST

## 2024-03-28 NOTE — PROGRESS NOTES
Hearing Aid Visit:    Name:  Rea Huizar  :  1935  Age:  88 y.o.  Date of Evaluation: 24     Rea Huizar is being seen for a hearing aid visit in conjunction with a 1.5 year audiological evaluation.      Patient is fit with [employer] OPN S 3 minirite R hearing aid(s).   Right serial number [id number]. Left serial number [id number].   Warranty date: OOW 23 (Loss/Damage and repair).   Dome/Earmold: 6 mm DB   : Right # 2 85 / Left # 2 85    Action:  Removed moderate cerumen from the right canal via curette w/o incident.  The left canal had hard wax, far back in canal and it was uncomfortable for the patient when removal attempted.     Advised daughter to use Debrox drops for a few days to soften it per the package instructions, left ear only needed    Reprogrammed aids to updated audiogram    Recommendations:   1. Ordered new batteries with [employer] SO [id number]  2. Will attempt CR of left canal when we switch over the batteries ($75)    Amina Santiago, CCC-A  [place]# [id number]  Clinical Audiologist

## 2024-04-01 NOTE — PROGRESS NOTES
Diagnostic Hearing Evaluation    Name:  Rea Huizar  :  1935  Age:  88 y.o.   MRN:  [id number]  Date of Evaluation: 24     HISTORY:     Reason for visit: Known Hearing Loss binaurally    Rea Huizar is being seen today at the request of Dr. Lombardi for a follow up  evaluation of hearing. She was accompanied today by her daughter who provided today's visit. Patient denies otalgia and otorrhea. Interim otologic Hx was unremarkable.      EVALUATION:    Otoscopic Evaluation:   Right Ear: Unremarkable, canal clear   Left Ear: Unremarkable, canal clear    Tympanometry:   Right Ear: Type A; normal middle ear pressure and static compliance    Left Ear: Type A; normal middle ear pressure and static compliance     Speech Audiometry:  Speech Reception (SRT)    Right Ear: 40 dB HL    Left Ear: 25 dB HL    Word Recognition Scores (WRS):  Right Ear: fair (72 % correct)     Left Ear: excellent (100 % correct)    Stimuli: [id number]    Pure Tone Audiometry:  Conventional pure tone audiometry from 250 - 8000 Hz  was obtained with good reliability and revealed the following:     Right Ear: Normal sloping to profound sensorineural hearing loss (SNHL)    Left Ear: Normal sloping to profound sensorineural hearing loss (SNHL)     *see attached audiogram    IMPRESSIONS:   Bilateral SNHL, normal to profound AU.    RECOMMENDATIONS:  Continued HA usage, further recommendations per Dr. Holley.     PATIENT EDUCATION:   The results of today's results and recommendations were reviewed with the patient and her hearing thresholds were explained at length. Treatment options, including amplification and communication strategies, were discussed as appropriate. The patient voiced understanding of her test results. Questions were addressed and the patient was encouraged to contact our department should concerns arise.      Sue Couch.  Clinical Audiologist  [facility] PROFESSIONAL [facility] AUDIOLOG[facility]  [address]  [facility] [id number]

## 2024-04-08 ENCOUNTER — TELEPHONE (OUTPATIENT)
Dept: AUDIOLOGY | Facility: CLINIC | Age: 89
End: 2024-04-08

## 2024-04-08 NOTE — TELEPHONE ENCOUNTER
Called and spoke with daughter Delfina to let her know the HA batteries are in office.  We will need an appointment as I will attempt to remove cerumen from Left canal when changing the batteries.  She will call back to schedule when she has her calendar.

## 2024-04-11 ENCOUNTER — OFFICE VISIT (OUTPATIENT)
Dept: AUDIOLOGY | Facility: CLINIC | Age: 89
End: 2024-04-11
Payer: COMMERCIAL

## 2024-04-11 DIAGNOSIS — H90.3 SENSORINEURAL HEARING LOSS (SNHL), BILATERAL: Primary | ICD-10-CM

## 2024-04-11 NOTE — PROGRESS NOTES
Hearing Aid Visit:    Name:  eRa Huizar  :  1935  Age:  88 y.o.  Date of Evaluation: 24     Rea Huizar is being seen for a hearing aid visit TO REPLACE BATTERIES IN BOTH DEVICES     Patient is fit with [employer] S 3 minirite R hearing aid(s).   Right serial number [id number]. Left serial number [id number].   Warranty date: OOW 23 (Loss/Damage and repair).   Dome/Earmold: 6 mm DB   : Right # 2 85 / Left # 2 85    Action:  Removed moderate cerumen from the LEFT canal via curette and alligator forceps w/o incident.  They had used wax softening drops for 2 days prior to appointment.    Changed both Lithium ion batteries ($75)    Recommendations:   1. RTO 10/17/2024 for 6 month visit ($)      Amina Santiago, CCC-A  [place]# [id number]  Clinical Audiologist

## 2024-05-16 ENCOUNTER — OFFICE VISIT (OUTPATIENT)
Age: 89
End: 2024-05-16
Payer: MEDICARE

## 2024-05-16 VITALS
HEART RATE: 99 BPM | WEIGHT: 127 LBS | HEIGHT: 59 IN | SYSTOLIC BLOOD PRESSURE: 151 MMHG | RESPIRATION RATE: 16 BRPM | DIASTOLIC BLOOD PRESSURE: 86 MMHG | BODY MASS INDEX: 25.6 KG/M2

## 2024-05-16 DIAGNOSIS — M79.672 PAIN IN BOTH FEET: ICD-10-CM

## 2024-05-16 DIAGNOSIS — M79.671 PAIN IN BOTH FEET: ICD-10-CM

## 2024-05-16 DIAGNOSIS — L84 CORNS: ICD-10-CM

## 2024-05-16 DIAGNOSIS — I70.209 PERIPHERAL ARTERIOSCLEROSIS (HCC): Primary | ICD-10-CM

## 2024-05-16 PROCEDURE — 11056 PARNG/CUTG B9 HYPRKR LES 2-4: CPT | Performed by: PODIATRIST

## 2024-05-16 NOTE — PROGRESS NOTES
Assessment/Plan:  Pain.  Metatarsalgia.  Peripheral artery disease.  Bunion formation.  Callus formation.  6 plantar lesions noted.  Mycotic toenail.  Clawtoe     Plan.  Chart reviewed.  Foot exam performed.  Patient educated on conditions.  All mycotic nails debrided.  Plantar lesions debrided as well.  Patient and family given aftercare instruction.  Return for follow-up.     Subjective:  Patient complains of pain in her feet.  Patient has pain with ambulation.  No history of trauma.  She has pain with shoe wear.                Allergies   Allergen Reactions    Cephalexin              Current Outpatient Prescriptions:     metoprolol tartrate (LOPRESSOR) 50 mg tablet, Take 50 mg by mouth 2 (two) times a day, Disp: , Rfl:     amLODIPine (NORVASC) 5 mg tablet, Take 5 mg by mouth daily, Disp: , Rfl:     aspirin 325 mg tablet, Take 325 mg by mouth daily, Disp: , Rfl:     atorvastatin (LIPITOR) 10 mg tablet, Take 10 mg by mouth daily, Disp: , Rfl:     losartan (COZAAR) 50 mg tablet, , Disp: , Rfl:     LUMIGAN 0.01 % ophthalmic drops, , Disp: , Rfl:                Patient ID: Rea Huizar is a 89 y.o. female.     HPI     The following portions of the patient's history were reviewed and updated as appropriate: allergies, current medications, past family history, past medical history, past social history, past surgical history and problem list.     Review of Systems       Objective:  Patient's shoes and socks removed.   Foot Exam     General  General Appearance: appears stated age and healthy   Orientation: alert and oriented to person, place, and time   Affect: appropriate   Gait: antalgic         Right Foot/Ankle      Inspection and Palpation  Ecchymosis: none  Tenderness: metatarsals   Swelling: metatarsals   Arch: pes planus  Hammertoes: fifth toe.  234 toes of the right foot are clawed.  Hallux valgus: yes  Hallux limitus: yes  Skin Exam: callus;      Neurovascular  Dorsalis pedis: 1+  Posterior tibial:  1+  Superficial peroneal nerve sensation: diminished  Deep peroneal nerve sensation: diminished  Sural nerve sensation: diminished  Achilles reflex: 2+     Muscle Strength  Ankle dorsiflexion: 4+        Left Foot/Ankle       Inspection and Palpation  Tenderness: metatarsals   Swelling: metatarsals   Arch: pes planus  Hammertoes: fifth toe.  Toes 234 are clawed a period  Hallux valgus: yes  Hallux limitus: yes     Neurovascular  Dorsalis pedis: 1+  Superficial peroneal nerve sensation: diminished  Deep peroneal nerve sensation: diminished  Sural nerve sensation: diminished  Achilles reflex: 2+     Muscle Strength  Ankle dorsiflexion: 4+        Physical Exam   Constitutional: She appears well-developed and well-nourished.   Cardiovascular: Normal rate and regular rhythm.    Pulses:       Dorsalis pedis pulses are 1+ on the right side, and 1+ on the left side.        Posterior tibial pulses are 1+ on the right side.   Q 9 findings noted bilateral.  Negative digital hair noted.  Positive abnormal cooling.   Feet:   Right Foot:   Skin Integrity: Positive for callus.   Neurological:   Reflex Scores:       Achilles reflexes are 2+ on the right side and 2+ on the left side.  Skin:   Tylenol submetatarsal 2 noted.  This is bilateral.    Severe thickened mycotic toenail noted bilateral.  Nails are yellow with debris.  Positive malodor noted      Distal clavi by 2nd 3rd and 4th toes of the left foot.  Nursing note and vitals reviewed.

## 2024-06-14 ENCOUNTER — TELEPHONE (OUTPATIENT)
Dept: NEUROLOGY | Facility: CLINIC | Age: 89
End: 2024-06-14

## 2024-06-14 NOTE — TELEPHONE ENCOUNTER
Spoke with patient daughter and confirmed appointment with Dr. Fournier 6/18 at [facility].

## 2024-06-17 ENCOUNTER — TELEPHONE (OUTPATIENT)
Dept: OTHER | Facility: OTHER | Age: 89
End: 2024-06-17

## 2024-06-17 NOTE — TELEPHONE ENCOUNTER
Patient is calling regarding cancelling an appointment.    Date/Time: 6/18/24 @ 8:30 am    Patient was rescheduled: YES [] NO [x]    Patient requesting call back to reschedule: YES [x] NO []

## 2024-06-18 NOTE — TELEPHONE ENCOUNTER
Spoke with patient daughter and rescheduled patient appointment for today with Dr. Fournier. Patient daughter accepted new date and time.

## 2024-07-11 ENCOUNTER — HOSPITAL ENCOUNTER (OUTPATIENT)
Dept: NON INVASIVE DIAGNOSTICS | Facility: HOSPITAL | Age: 89
Discharge: HOME | End: 2024-07-11
Payer: MEDICARE

## 2024-07-11 VITALS
WEIGHT: 127 LBS | HEART RATE: 92 BPM | HEIGHT: 59 IN | SYSTOLIC BLOOD PRESSURE: 146 MMHG | BODY MASS INDEX: 25.6 KG/M2 | DIASTOLIC BLOOD PRESSURE: 86 MMHG

## 2024-07-11 DIAGNOSIS — Z95.2 S/P AORTIC VALVE REPLACEMENT: ICD-10-CM

## 2024-07-11 PROCEDURE — 93306 TTE W/DOPPLER COMPLETE: CPT

## 2024-07-11 PROCEDURE — 93306 TTE W/DOPPLER COMPLETE: CPT | Performed by: INTERNAL MEDICINE

## 2024-07-14 LAB
AORTIC ROOT: 2.7 CM
AORTIC VALVE MEAN VELOCITY: 10.7 M/S
AV AREA BY CONTINUOUS VTI: 1.9 CM2
AV AREA PEAK VELOCITY: 1.6 CM2
AV LVOT MEAN GRADIENT: 2 MMHG
AV LVOT PEAK GRADIENT: 3 MMHG
AV MEAN GRADIENT: 5 MMHG
AV PEAK GRADIENT: 10 MMHG
AV VALVE AREA: 1.86 CM2
AV VELOCITY RATIO: 0.55
BSA FOR ECHO PROCEDURE: 1.52 M2
DOP CALC AO PEAK VEL: 1.56 M/S
DOP CALC AO VTI: 25.73 CM
DOP CALC LVOT AREA: 2.83 CM2
DOP CALC LVOT CARDIAC INDEX: 2.69 L/MIN/M2
DOP CALC LVOT CARDIAC OUTPUT: 4.09 L/MIN
DOP CALC LVOT DIAMETER: 1.9 CM
DOP CALC LVOT PEAK VEL VTI: 16.87 CM
DOP CALC LVOT PEAK VEL: 0.86 M/S
DOP CALC LVOT STROKE INDEX: 31.6 ML/M2
DOP CALC LVOT STROKE VOLUME: 47.81
FRACTIONAL SHORTENING: 32 (ref 28–44)
INTERVENTRICULAR SEPTUM IN DIASTOLE (PARASTERNAL SHORT AXIS VIEW): 0.9 CM
INTERVENTRICULAR SEPTUM: 0.9 CM (ref 0.6–1.1)
LAAS-AP2: 26.4 CM2
LAAS-AP4: 22.1 CM2
LEFT ATRIUM SIZE: 4.6 CM
LEFT ATRIUM VOLUME (MOD BIPLANE): 81 ML
LEFT ATRIUM VOLUME INDEX (MOD BIPLANE): 53.3 ML/M2
LEFT INTERNAL DIMENSION IN SYSTOLE: 2.8 CM (ref 2.1–4)
LEFT VENTRICULAR INTERNAL DIMENSION IN DIASTOLE: 4.1 CM (ref 3.5–6)
LEFT VENTRICULAR POSTERIOR WALL IN END DIASTOLE: 0.9 CM
LEFT VENTRICULAR STROKE VOLUME: 43 ML
LVSV (TEICH): 43 ML
MV E'TISSUE VEL-LAT: 6 CM/S
MV E'TISSUE VEL-SEP: 7 CM/S
RIGHT VENTRICLE ID DIMENSION: 3.1 CM
SL CV LEFT ATRIUM LENGTH A2C: 6.4 CM
SL CV LV EF: 59
SL CV PED ECHO LEFT VENTRICLE DIASTOLIC VOLUME (MOD BIPLANE) 2D: 73 ML
SL CV PED ECHO LEFT VENTRICLE SYSTOLIC VOLUME (MOD BIPLANE) 2D: 30 ML
TR PEAK VELOCITY: 5.7 M/S
TRICUSPID ANNULAR PLANE SYSTOLIC EXCURSION: 1.2 CM
TRICUSPID VALVE PEAK REGURGITATION VELOCITY: 5.72 M/S

## 2024-07-15 ENCOUNTER — TELEPHONE (OUTPATIENT)
Dept: CARDIOLOGY CLINIC | Facility: CLINIC | Age: 89
End: 2024-07-15

## 2024-07-15 NOTE — TELEPHONE ENCOUNTER
----- Message from Franko Lewis DO sent at 7/15/2024 11:18 AM EDT -----  Can you please let the patient know echo was overall unchanged from last one  Valve is working well

## 2024-07-18 ENCOUNTER — LAB (OUTPATIENT)
Dept: LAB | Facility: CLINIC | Age: 89
End: 2024-07-18
Payer: MEDICARE

## 2024-07-18 DIAGNOSIS — N18.30 STAGE 3 CHRONIC KIDNEY DISEASE, UNSPECIFIED WHETHER STAGE 3A OR 3B CKD (HCC): ICD-10-CM

## 2024-07-18 DIAGNOSIS — R41.89 COGNITIVE DECLINE: ICD-10-CM

## 2024-07-18 DIAGNOSIS — I10 ESSENTIAL HYPERTENSION: ICD-10-CM

## 2024-07-18 DIAGNOSIS — E78.2 MIXED HYPERLIPIDEMIA: ICD-10-CM

## 2024-07-18 LAB
ALBUMIN SERPL BCG-MCNC: 3.6 G/DL (ref 3.5–5)
ALP SERPL-CCNC: 81 U/L (ref 34–104)
ALT SERPL W P-5'-P-CCNC: 16 U/L (ref 7–52)
ANION GAP SERPL CALCULATED.3IONS-SCNC: 7 MMOL/L (ref 4–13)
AST SERPL W P-5'-P-CCNC: 23 U/L (ref 13–39)
BILIRUB SERPL-MCNC: 0.47 MG/DL (ref 0.2–1)
BUN SERPL-MCNC: 24 MG/DL (ref 5–25)
CALCIUM SERPL-MCNC: 8.9 MG/DL (ref 8.4–10.2)
CHLORIDE SERPL-SCNC: 103 MMOL/L (ref 96–108)
CHOLEST SERPL-MCNC: 108 MG/DL
CO2 SERPL-SCNC: 29 MMOL/L (ref 21–32)
CREAT SERPL-MCNC: 1.04 MG/DL (ref 0.6–1.3)
ERYTHROCYTE [DISTWIDTH] IN BLOOD BY AUTOMATED COUNT: 14.7 % (ref 11.6–15.1)
GFR SERPL CREATININE-BSD FRML MDRD: 47 ML/MIN/1.73SQ M
GLUCOSE P FAST SERPL-MCNC: 86 MG/DL (ref 65–99)
HCT VFR BLD AUTO: 40.2 % (ref 34.8–46.1)
HDLC SERPL-MCNC: 58 MG/DL
HGB BLD-MCNC: 12.5 G/DL (ref 11.5–15.4)
LDLC SERPL CALC-MCNC: 39 MG/DL (ref 0–100)
MCH RBC QN AUTO: 29.7 PG (ref 26.8–34.3)
MCHC RBC AUTO-ENTMCNC: 31.1 G/DL (ref 31.4–37.4)
MCV RBC AUTO: 96 FL (ref 82–98)
PLATELET # BLD AUTO: 148 THOUSANDS/UL (ref 149–390)
PMV BLD AUTO: 10.9 FL (ref 8.9–12.7)
POTASSIUM SERPL-SCNC: 4.3 MMOL/L (ref 3.5–5.3)
PROT SERPL-MCNC: 7.3 G/DL (ref 6.4–8.4)
RBC # BLD AUTO: 4.21 MILLION/UL (ref 3.81–5.12)
SODIUM SERPL-SCNC: 139 MMOL/L (ref 135–147)
TRIGL SERPL-MCNC: 57 MG/DL
TSH SERPL DL<=0.05 MIU/L-ACNC: 1.42 UIU/ML (ref 0.45–4.5)
WBC # BLD AUTO: 4.79 THOUSAND/UL (ref 4.31–10.16)

## 2024-07-18 PROCEDURE — 84443 ASSAY THYROID STIM HORMONE: CPT

## 2024-07-18 PROCEDURE — 36415 COLL VENOUS BLD VENIPUNCTURE: CPT

## 2024-07-18 PROCEDURE — 80061 LIPID PANEL: CPT

## 2024-07-18 PROCEDURE — 85027 COMPLETE CBC AUTOMATED: CPT

## 2024-07-18 PROCEDURE — 80053 COMPREHEN METABOLIC PANEL: CPT

## 2024-07-22 ENCOUNTER — OFFICE VISIT (OUTPATIENT)
Dept: FAMILY MEDICINE CLINIC | Facility: CLINIC | Age: 89
End: 2024-07-22
Payer: MEDICARE

## 2024-07-22 VITALS
HEART RATE: 92 BPM | RESPIRATION RATE: 16 BRPM | HEIGHT: 59 IN | SYSTOLIC BLOOD PRESSURE: 134 MMHG | WEIGHT: 128.8 LBS | TEMPERATURE: 97.7 F | DIASTOLIC BLOOD PRESSURE: 76 MMHG | BODY MASS INDEX: 25.96 KG/M2

## 2024-07-22 DIAGNOSIS — Z00.00 MEDICARE ANNUAL WELLNESS VISIT, SUBSEQUENT: Primary | ICD-10-CM

## 2024-07-22 DIAGNOSIS — E78.2 MIXED HYPERLIPIDEMIA: ICD-10-CM

## 2024-07-22 DIAGNOSIS — I10 ESSENTIAL HYPERTENSION: ICD-10-CM

## 2024-07-22 DIAGNOSIS — S06.360A: ICD-10-CM

## 2024-07-22 DIAGNOSIS — T78.40XA ALLERGY, INITIAL ENCOUNTER: ICD-10-CM

## 2024-07-22 PROCEDURE — G0439 PPPS, SUBSEQ VISIT: HCPCS | Performed by: FAMILY MEDICINE

## 2024-07-22 RX ORDER — MONTELUKAST SODIUM 10 MG/1
10 TABLET ORAL
Qty: 90 TABLET | Refills: 0 | Status: SHIPPED | OUTPATIENT
Start: 2024-07-22

## 2024-07-22 NOTE — PROGRESS NOTES
Ambulatory Visit  Name: Rea Huizar      : 1935      MRN: [id number]  Encounter Provider: Frank Lombardi, DO  Encounter Date: 2024   Encounter department: [facility]    Assessment & Plan   1. Medicare annual wellness visit, subsequent  2. Allergy, initial encounter  -     montelukast (SINGULAIR) 10 mg tablet; Take 1 tablet (10 mg total) by mouth daily at bedtime  3. Traumatic hemorrhage of cerebrum, unspecified, without loss of consciousness, initial encounter ([facility])  4. Mixed hyperlipidemia  -     CBC; Future  -     Comprehensive metabolic panel; Future; Expected date: 2025  -     Lipid Panel with Direct LDL reflex; Future; Expected date: 2025  -     TSH, 3rd generation; Future; Expected date: 2025  5. Essential hypertension  Assessment & Plan:  stable  Orders:  -     CBC; Future  -     Comprehensive metabolic panel; Future; Expected date: 2025  -     Lipid Panel with Direct LDL reflex; Future; Expected date: 2025  -     TSH, 3rd generation; Future; Expected date: 2025       Preventive health issues were discussed with patient, and age appropriate screening tests were ordered as noted in patient's After Visit Summary. Personalized health advice and appropriate referrals for health education or preventive services given if needed, as noted in patient's After Visit Summary.    History of Present Illness     Pt is sched for an AWV  Pt had dissbility placard form  Pt has been dealing with allergies antihuistamines dont seem to be stopping         Patient Care Team:  Frank Lombardi, DO as PCP - General (Family Medicine)  Chadwick Montague MD as Consulting Physician (Ophthalmology)  Fred Diaz DPM as Consulting Physician (Podiatry)  Franko Lewis DO as Consulting Physician (Cardiology)    Review of Systems  Medical History Reviewed by provider this encounter:       Annual Wellness Visit Questionnaire   Rea is here for her Subsequent Wellness visit. Last  Medicare Wellness visit information reviewed, patient interviewed, no change since last AWV.     Health Risk Assessment:   Patient rates overall health as good. Patient feels that their physical health rating is same. Patient is very satisfied with their life. Eyesight was rated as slightly worse. Hearing was rated as slightly worse. Patient feels that their emotional and mental health rating is same. Patients states they are never, rarely angry. Patient states they are sometimes unusually tired/fatigued. Pain experienced in the last 7 days has been some. Patient's pain rating has been 3/10. Patient states that she has experienced no weight loss or gain in last 6 months.     Depression Screening:   PHQ-2 Score: 0      Fall Risk Screening:   In the past year, patient has experienced: no history of falling in past year      Urinary Incontinence Screening:   Patient has leaked urine accidently in the last six months.     Home Safety:  Patient has trouble with stairs inside or outside of their home. Patient has working smoke alarms and has working carbon monoxide detector. Home safety hazards include: none.     Nutrition:   Current diet is Regular.     Medications:   Patient is currently taking over-the-counter supplements. OTC medications include: see medication list. Patient is not able to manage medications.     Activities of Daily Living (ADLs)/Instrumental Activities of Daily Living (IADLs):   Walk and transfer into and out of bed and chair?: Yes  Dress and groom yourself?: Yes    Bathe or shower yourself?: Yes    Feed yourself? Yes  Do your laundry/housekeeping?: Yes  Manage your money, pay your bills and track your expenses?: No  Make your own meals?: No    Do your own shopping?: No    Previous Hospitalizations:   Any hospitalizations or ED visits within the last 12 months?: No      Advance Care Planning:   Living will: Yes    Durable POA for healthcare: Yes    Advanced directive: Yes      Cognitive Screening:    Provider or family/friend/caregiver concerned regarding cognition?: No    PREVENTIVE SCREENINGS      Cardiovascular Screening:    General: Screening Not Indicated, History Lipid Disorder and Risks and Benefits Discussed    Due for: Lipid Panel      Diabetes Screening:     General: Screening Current and Risks and Benefits Discussed    Due for: Blood Glucose      Colorectal Cancer Screening:     General: Screening Not Indicated      Breast Cancer Screening:     General: Risks and Benefits Discussed and Patient Declines      Cervical Cancer Screening:    General: Screening Not Indicated      Osteoporosis Screening:    General: Risks and Benefits Discussed and Patient Declines      Abdominal Aortic Aneurysm (AAA) Screening:        General: Screening Not Indicated      Lung Cancer Screening:     General: Screening Not Indicated      Hepatitis C Screening:    General: Risks and Benefits Discussed and Patient Declines    Screening, Brief Intervention, and Referral to Treatment (SBIRT)    Screening  Typical number of drinks in a day: 0  Typical number of drinks in a week: 0  Interpretation: Low risk drinking behavior.    Single Item Drug Screening:  How often have you used an illegal drug (including marijuana) or a prescription medication for non-medical reasons in the past year? never    Single Item Drug Screen Score: 0  Interpretation: Negative screen for possible drug use disorder    Brief Intervention  Alcohol & drug use screenings were reviewed. No concerns regarding substance use disorder identified.     Social Determinants of Health     Financial Resource Strain: Low Risk  (4/21/2023)    Overall Financial Resource Strain (CARDIA)    • Difficulty of Paying Living Expenses: Not hard at all   Food Insecurity: No Food Insecurity (7/22/2024)    Hunger Vital Sign    • Worried About Running Out of Food in the Last Year: Never true    • Ran Out of Food in the Last Year: Never true   Transportation Needs: No Transportation  "Needs (7/22/2024)    PRAPARE - Transportation    • Lack of Transportation (Medical): No    • Lack of Transportation (Non-Medical): No   Housing Stability: Low Risk  (7/22/2024)    Housing Stability Vital Sign    • Unable to Pay for Housing in the Last Year: No    • Number of Times Moved in the Last Year: 0    • Homeless in the Last Year: No   Utilities: Not At Risk (7/22/2024)    [facility] Utilities    • Threatened with loss of utilities: No     No results found.    Objective     /76   Pulse 92   Temp 97.7 °F (36.5 °C) (Tympanic)   Resp 16   Ht 4' 11\" (1.499 m)   Wt 58.4 kg (128 lb 12.8 oz)   BMI 26.01 kg/m²     Physical Exam      "

## 2024-07-22 NOTE — PATIENT INSTRUCTIONS

## 2024-07-25 ENCOUNTER — OFFICE VISIT (OUTPATIENT)
Age: 89
End: 2024-07-25
Payer: MEDICARE

## 2024-07-25 VITALS
SYSTOLIC BLOOD PRESSURE: 127 MMHG | HEIGHT: 59 IN | DIASTOLIC BLOOD PRESSURE: 81 MMHG | HEART RATE: 75 BPM | BODY MASS INDEX: 25.8 KG/M2 | RESPIRATION RATE: 16 BRPM | WEIGHT: 128 LBS

## 2024-07-25 DIAGNOSIS — L84 CORNS: ICD-10-CM

## 2024-07-25 DIAGNOSIS — I70.209 PERIPHERAL ARTERIOSCLEROSIS (HCC): Primary | ICD-10-CM

## 2024-07-25 DIAGNOSIS — M79.671 PAIN IN BOTH FEET: ICD-10-CM

## 2024-07-25 DIAGNOSIS — M79.672 PAIN IN BOTH FEET: ICD-10-CM

## 2024-07-25 PROCEDURE — RECHECK: Performed by: PODIATRIST

## 2024-07-25 PROCEDURE — 11056 PARNG/CUTG B9 HYPRKR LES 2-4: CPT | Performed by: PODIATRIST

## 2024-08-02 ENCOUNTER — TELEPHONE (OUTPATIENT)
Dept: NEUROLOGY | Facility: CLINIC | Age: 89
End: 2024-08-02

## 2024-08-02 NOTE — TELEPHONE ENCOUNTER
Spoke with patient daughter and confirmed appointment with Dr. Fournier 8/6 at [facility].

## 2024-09-09 DIAGNOSIS — I10 ESSENTIAL HYPERTENSION: ICD-10-CM

## 2024-09-09 RX ORDER — AMLODIPINE BESYLATE 5 MG/1
5 TABLET ORAL DAILY
Qty: 90 TABLET | Refills: 1 | Status: SHIPPED | OUTPATIENT
Start: 2024-09-09

## 2024-09-09 NOTE — TELEPHONE ENCOUNTER
Medication: amlodipine    Dose/Frequency: 5 mg daily    Quantity: 90    Pharmacy: [facility]    Office:   [] PCP/Provider -   [x] Speciality/Provider -     Does the patient have enough for 3 days?   [x] Yes   [] No - Send as HP to POD

## 2024-09-16 DIAGNOSIS — I10 ESSENTIAL HYPERTENSION: ICD-10-CM

## 2024-09-16 NOTE — TELEPHONE ENCOUNTER
Medication: Losartan 100mg tablet    Dose/Frequency: 1 tablet daily    Quantity: 90 tablets    [facility]    Office:   [] PCP/Provider -   [x] Speciality/Provider -     Does the patient have enough for 3 days?   [x] Yes   [] No - Send as HP to POD

## 2024-09-17 RX ORDER — LOSARTAN POTASSIUM 100 MG/1
100 TABLET ORAL DAILY
Qty: 90 TABLET | Refills: 1 | Status: SHIPPED | OUTPATIENT
Start: 2024-09-17

## 2024-10-14 DIAGNOSIS — T78.40XA ALLERGY, INITIAL ENCOUNTER: ICD-10-CM

## 2024-10-14 NOTE — TELEPHONE ENCOUNTER
Message sent via [employer].   Good morning, I am in need of a refill for Montelukast Sodium 10G Tabs. Quantity 90. [facility]. If you have any questions, please call [phone]. Thanks

## 2024-10-15 RX ORDER — MONTELUKAST SODIUM 10 MG/1
10 TABLET ORAL
Qty: 90 TABLET | Refills: 1 | Status: SHIPPED | OUTPATIENT
Start: 2024-10-15

## 2024-10-21 DIAGNOSIS — I10 ESSENTIAL HYPERTENSION: ICD-10-CM

## 2024-10-21 DIAGNOSIS — I44.0 HEART BLOCK, FIRST DEGREE: ICD-10-CM

## 2024-10-21 NOTE — TELEPHONE ENCOUNTER
Medication: metoprolol succinate 25mg tablet    Dose/Frequency: 1 tablet daily    Quantity: 90 tablets    Pharmacy: [facility]    Office:   [] PCP/Provider -   [x] Speciality/Provider -     Does the patient have enough for 3 days?   [x] Yes   [] No - Send as HP to POD

## 2024-10-22 RX ORDER — METOPROLOL SUCCINATE 25 MG/1
25 TABLET, EXTENDED RELEASE ORAL DAILY
Qty: 90 TABLET | Refills: 1 | Status: SHIPPED | OUTPATIENT
Start: 2024-10-22

## 2024-10-24 ENCOUNTER — OFFICE VISIT (OUTPATIENT)
Age: 89
End: 2024-10-24
Payer: MEDICARE

## 2024-10-24 VITALS
SYSTOLIC BLOOD PRESSURE: 136 MMHG | BODY MASS INDEX: 25.8 KG/M2 | HEIGHT: 59 IN | HEART RATE: 89 BPM | DIASTOLIC BLOOD PRESSURE: 69 MMHG | RESPIRATION RATE: 17 BRPM | WEIGHT: 128 LBS

## 2024-10-24 DIAGNOSIS — M79.671 PAIN IN BOTH FEET: ICD-10-CM

## 2024-10-24 DIAGNOSIS — M79.672 PAIN IN BOTH FEET: ICD-10-CM

## 2024-10-24 DIAGNOSIS — L84 CORNS: ICD-10-CM

## 2024-10-24 DIAGNOSIS — I70.209 PERIPHERAL ARTERIOSCLEROSIS (HCC): Primary | ICD-10-CM

## 2024-10-24 PROCEDURE — RECHECK: Performed by: PODIATRIST

## 2024-10-24 PROCEDURE — 11056 PARNG/CUTG B9 HYPRKR LES 2-4: CPT | Performed by: PODIATRIST

## 2025-01-02 ENCOUNTER — OFFICE VISIT (OUTPATIENT)
Dept: FAMILY MEDICINE CLINIC | Facility: CLINIC | Age: OVER 89
End: 2025-01-02
Payer: MEDICARE

## 2025-01-02 VITALS
HEIGHT: 59 IN | BODY MASS INDEX: 26 KG/M2 | DIASTOLIC BLOOD PRESSURE: 84 MMHG | SYSTOLIC BLOOD PRESSURE: 126 MMHG | WEIGHT: 129 LBS | HEART RATE: 56 BPM | TEMPERATURE: 98.2 F

## 2025-01-02 DIAGNOSIS — R53.1 GENERALIZED WEAKNESS: ICD-10-CM

## 2025-01-02 DIAGNOSIS — Q04.6 COLLOID CYST OF BRAIN (HCC): ICD-10-CM

## 2025-01-02 DIAGNOSIS — N39.0 URINARY TRACT INFECTION WITHOUT HEMATURIA, SITE UNSPECIFIED: Primary | ICD-10-CM

## 2025-01-02 DIAGNOSIS — I48.0 PAROXYSMAL ATRIAL FIBRILLATION (HCC): ICD-10-CM

## 2025-01-02 DIAGNOSIS — N18.30 STAGE 3 CHRONIC KIDNEY DISEASE, UNSPECIFIED WHETHER STAGE 3A OR 3B CKD (HCC): ICD-10-CM

## 2025-01-02 PROBLEM — I70.209 PERIPHERAL ARTERIOSCLEROSIS (HCC): Status: RESOLVED | Noted: 2018-11-13 | Resolved: 2025-01-02

## 2025-01-02 LAB
SL AMB  POCT GLUCOSE, UA: NEGATIVE
SL AMB LEUKOCYTE ESTERASE,UA: NORMAL
SL AMB POCT BILIRUBIN,UA: NEGATIVE
SL AMB POCT BLOOD,UA: NORMAL
SL AMB POCT CLARITY,UA: NORMAL
SL AMB POCT COLOR,UA: NORMAL
SL AMB POCT KETONES,UA: NORMAL
SL AMB POCT NITRITE,UA: POSITIVE
SL AMB POCT PH,UA: 6
SL AMB POCT SPECIFIC GRAVITY,UA: 1.02
SL AMB POCT URINE PROTEIN: NORMAL
SL AMB POCT UROBILINOGEN: 1

## 2025-01-02 PROCEDURE — 87077 CULTURE AEROBIC IDENTIFY: CPT | Performed by: FAMILY MEDICINE

## 2025-01-02 PROCEDURE — 99213 OFFICE O/P EST LOW 20 MIN: CPT | Performed by: FAMILY MEDICINE

## 2025-01-02 PROCEDURE — 87086 URINE CULTURE/COLONY COUNT: CPT | Performed by: FAMILY MEDICINE

## 2025-01-02 PROCEDURE — 87186 SC STD MICRODIL/AGAR DIL: CPT | Performed by: FAMILY MEDICINE

## 2025-01-02 PROCEDURE — 81003 URINALYSIS AUTO W/O SCOPE: CPT | Performed by: FAMILY MEDICINE

## 2025-01-02 PROCEDURE — G2211 COMPLEX E/M VISIT ADD ON: HCPCS | Performed by: FAMILY MEDICINE

## 2025-01-02 RX ORDER — FLUCONAZOLE 150 MG/1
150 TABLET ORAL ONCE
Qty: 1 TABLET | Refills: 0 | Status: SHIPPED | OUTPATIENT
Start: 2025-01-02 | End: 2025-01-02

## 2025-01-02 RX ORDER — CIPROFLOXACIN 500 MG/1
500 TABLET, FILM COATED ORAL EVERY 12 HOURS SCHEDULED
Qty: 6 TABLET | Refills: 0 | Status: SHIPPED | OUTPATIENT
Start: 2025-01-02 | End: 2025-01-05

## 2025-01-02 RX ORDER — DOCUSATE SODIUM 100 MG/1
100 CAPSULE, LIQUID FILLED ORAL 2 TIMES DAILY
COMMUNITY

## 2025-01-02 NOTE — ASSESSMENT & PLAN NOTE
Lab Results   Component Value Date    EGFR 47 07/18/2024    EGFR 50 11/01/2023    EGFR 52 04/18/2023    CREATININE 1.04 07/18/2024    CREATININE 1.00 11/01/2023    CREATININE 0.98 04/18/2023

## 2025-01-02 NOTE — PROGRESS NOTES
"Name: Rea Huizar      : 1935      MRN: [id number]  Encounter Provider: Frank Lombardi, DO  Encounter Date: 2025   Encounter department: [facility]  :  Assessment & Plan  Urinary tract infection without hematuria, site unspecified  Will treat with Cipro.  Daughter states that she is allergic to Bactrim so would rather use something different.  Will follow-up culture  Orders:  •  POCT urine dip auto non-scope  •  Urine culture  •  ciprofloxacin (CIPRO) 500 mg tablet; Take 1 tablet (500 mg total) by mouth every 12 (twelve) hours for 3 days  •  fluconazole (DIFLUCAN) 150 mg tablet; Take 1 tablet (150 mg total) by mouth once for 1 dose    Colloid cyst of brain (HCC)  No current symptoms       Paroxysmal atrial fibrillation (HCC)  Patient denies palpitations       Stage 3 chronic kidney disease, unspecified whether stage 3a or 3b CKD (HCC)  Lab Results   Component Value Date    EGFR 47 2024    EGFR 50 2023    EGFR 52 2023    CREATININE 1.04 2024    CREATININE 1.00 2023    CREATININE 0.98 2023          Generalized weakness  Patient is 89 years old and does not have generalized weakness.  She is here in the office today with her daughter who assists her in activities              History of Present Illness     Pt is sched for a same day appt for a UTI  Pt has burning with urination  Started with frequency 4 days ago        Review of Systems   Genitourinary:  Positive for dysuria and frequency.       Objective   /84   Pulse 56   Temp 98.2 °F (36.8 °C)   Ht 4' 11\" (1.499 m)   Wt 58.5 kg (129 lb)   BMI 26.05 kg/m²      Physical Exam  Vitals and nursing note reviewed.   Constitutional:       General: She is not in acute distress.     Appearance: She is well-developed. She is not diaphoretic.   HENT:      Head: Normocephalic and atraumatic.      Right Ear: External ear normal.      Left Ear: External ear normal.      Nose: Nose normal.      " Mouth/Throat:      Pharynx: No oropharyngeal exudate.   Eyes:      General: No scleral icterus.        Right eye: No discharge.         Left eye: No discharge.      Pupils: Pupils are equal, round, and reactive to light.   Neck:      Thyroid: No thyromegaly.   Cardiovascular:      Rate and Rhythm: Normal rate.      Heart sounds: Normal heart sounds. No murmur heard.  Pulmonary:      Effort: Pulmonary effort is normal. No respiratory distress.      Breath sounds: Normal breath sounds. No wheezing.   Abdominal:      General: Bowel sounds are normal. There is no distension.      Palpations: Abdomen is soft. There is no mass.      Tenderness: There is no abdominal tenderness. There is no guarding or rebound.   Musculoskeletal:         General: Normal range of motion.   Skin:     General: Skin is warm and dry.      Findings: No erythema or rash.   Neurological:      Mental Status: She is alert.      Coordination: Coordination normal.      Deep Tendon Reflexes: Reflexes normal.   Psychiatric:         Behavior: Behavior normal.

## 2025-01-04 LAB — BACTERIA UR CULT: ABNORMAL

## 2025-01-06 ENCOUNTER — RESULTS FOLLOW-UP (OUTPATIENT)
Dept: FAMILY MEDICINE CLINIC | Facility: CLINIC | Age: OVER 89
End: 2025-01-06

## 2025-02-20 ENCOUNTER — OFFICE VISIT (OUTPATIENT)
Age: OVER 89
End: 2025-02-20
Payer: MEDICARE

## 2025-02-20 VITALS — BODY MASS INDEX: 26 KG/M2 | RESPIRATION RATE: 17 BRPM | WEIGHT: 129 LBS | HEIGHT: 59 IN

## 2025-02-20 DIAGNOSIS — M79.671 PAIN IN BOTH FEET: ICD-10-CM

## 2025-02-20 DIAGNOSIS — L84 CORNS: ICD-10-CM

## 2025-02-20 DIAGNOSIS — I70.209 PERIPHERAL ARTERIOSCLEROSIS (HCC): Primary | ICD-10-CM

## 2025-02-20 DIAGNOSIS — M79.672 PAIN IN BOTH FEET: ICD-10-CM

## 2025-02-20 PROCEDURE — RECHECK: Performed by: PODIATRIST

## 2025-02-20 PROCEDURE — 11056 PARNG/CUTG B9 HYPRKR LES 2-4: CPT | Performed by: PODIATRIST

## 2025-03-10 DIAGNOSIS — I10 ESSENTIAL HYPERTENSION: ICD-10-CM

## 2025-03-10 NOTE — TELEPHONE ENCOUNTER
Reason for call:   [x] Refill   [] Prior Auth  [] Other:     Office:   [] PCP/Provider -   [x] Specialty/Provider - Cardio    amLODIPine (NORVASC) 5 mg tablet / : Take 1 tablet (5 mg total) by mouth daily, / Qty 90    losartan (COZAAR) 100 MG tablet /  Take 1 tablet (100 mg total) by mouth daily / Qty 90    Pharmacy: [id number] [address] [place] [address]     Local Pharmacy   Does the patient have enough for 3 days?   [x] Yes   [] No - Send as HP to POD    Mail Away Pharmacy   Does the patient have enough for 10 days?   [] Yes   [] No - Send as HP to POD

## 2025-03-11 RX ORDER — AMLODIPINE BESYLATE 5 MG/1
5 TABLET ORAL DAILY
Qty: 90 TABLET | Refills: 1 | Status: SHIPPED | OUTPATIENT
Start: 2025-03-11

## 2025-03-11 RX ORDER — LOSARTAN POTASSIUM 100 MG/1
100 TABLET ORAL DAILY
Qty: 90 TABLET | Refills: 1 | Status: SHIPPED | OUTPATIENT
Start: 2025-03-11

## 2025-03-24 ENCOUNTER — OFFICE VISIT (OUTPATIENT)
Dept: CARDIOLOGY CLINIC | Facility: CLINIC | Age: OVER 89
End: 2025-03-24
Payer: MEDICARE

## 2025-03-24 VITALS
HEART RATE: 109 BPM | HEIGHT: 59 IN | WEIGHT: 129 LBS | BODY MASS INDEX: 26 KG/M2 | SYSTOLIC BLOOD PRESSURE: 150 MMHG | OXYGEN SATURATION: 99 % | DIASTOLIC BLOOD PRESSURE: 90 MMHG

## 2025-03-24 DIAGNOSIS — E78.2 MIXED HYPERLIPIDEMIA: Primary | ICD-10-CM

## 2025-03-24 DIAGNOSIS — Z95.2 S/P AORTIC VALVE REPLACEMENT: ICD-10-CM

## 2025-03-24 DIAGNOSIS — I35.9 AORTIC VALVE DISEASE: ICD-10-CM

## 2025-03-24 DIAGNOSIS — I10 ESSENTIAL HYPERTENSION: ICD-10-CM

## 2025-03-24 DIAGNOSIS — I49.5 SINUS BRADY-TACHY SYNDROME (HCC): ICD-10-CM

## 2025-03-24 DIAGNOSIS — N18.30 STAGE 3 CHRONIC KIDNEY DISEASE, UNSPECIFIED WHETHER STAGE 3A OR 3B CKD (HCC): ICD-10-CM

## 2025-03-24 DIAGNOSIS — I44.0 HEART BLOCK, FIRST DEGREE: ICD-10-CM

## 2025-03-24 DIAGNOSIS — I48.0 PAROXYSMAL ATRIAL FIBRILLATION (HCC): ICD-10-CM

## 2025-03-24 PROCEDURE — 93000 ELECTROCARDIOGRAM COMPLETE: CPT | Performed by: INTERNAL MEDICINE

## 2025-03-24 PROCEDURE — 99214 OFFICE O/P EST MOD 30 MIN: CPT | Performed by: INTERNAL MEDICINE

## 2025-03-24 NOTE — PROGRESS NOTES
Cardiology   Franko Lewis DO, [facility]  Damien Del Rio MD, [facility]  Akil Flores MD, [facility]  Kaelyn De La Cruz MD, [facility]  -------------------------------------------------------------------  [facility]  [address], [address], [address]  [place], [place], [address]  [phone]    Cardiology Follow Up  Rea Huizar  1935  [id number]          Assessment/Plan:    1. Aortic valve disease with prior AVR  -Most recent echocardiogram showed normal valve function.  Normal valve gradients without any regurgitation.      2. Essential hypertension  -  Blood pressure controlled on current Rx.     - continue losartan 100 mg daily.  -Continue amlodipine 5 mg daily.     - Avoid HCTZ because of hyponatremia.    -  She will monitor at home and call if blood pressure increases.  Was on spironolactone last visit which was stopped due to hypotension.  She should resume if blood pressure remains elevated.    3. Paroxysmal atrial fibrillation  - Rate is elevated today.  Usually well-controlled.  - Not on anticoagulation due to fall risk     4. Sinus luis angel-tachy syndrome (HCC)  - heart rate elevated today.  Has had bradycardia episodes in the past.    5. CVA/Peripheral arteriosclerosis (HCC)  - continue aspirin  -She had an occipital infarct.  Has only 1 discrete area of infarction.  Likely not embolic.  Discussed anticoagulation risk and benefit.  Will continue to remain off of anticoagulation at this time.  - Tolerating atorvastatin    6. Bilateral leg edema  - resolved with discontinuation of higher dose of amlodipine.  Has been restarted along with increased ARB dose.  No edema present today.     7. Mixed hyperlipidemia  - Crestor was discontinued due to GI side effects.  She is tolerating atorvastatin.     Interval History:     Rea Huizar is 89 y.o. female here for followup of hypertension.     Since her last visit, she has been feeling well.  she denies any palpitations, chest  pain, shortness of breath, LE edema, orthopnea or PND.   Diet is overall unchanged.  There has not been a significant change in weight.  HR elevated today.     Currently, she is using spironolactone 25 mg daily along with metoprolol 25 mg daily and losartan 100 mg daily.     In November 2020, blood pressure was markedly elevated during that visit and hydrochlorothiazide 25 mg daily was added to her medication regimen.  Previously, she developed lower extremity edema which improved with amlodipine discontinuation.  Since then, blood pressure has been elevated.  She was started on Toprol XL 25 mg daily along with losartan 50 mg daily.      Losartan was increased to 100 mg daily and Hydrochlorothiazide was stopped after developing hyponatremia.  Previously, felodipine 5 mg daily was added because of elevated blood pressure.   She has not had any LE edema but feels it has caused constipation.  She was switched back to amlodipine 5 mg daily.   The patient has a history of aortic valve replacement.  In June 2015, she had aortic valve replacement at [facility] for aortic valve stenosis.  She has no history of coronary artery disease.        Past Medical History:   Diagnosis Date    Aortic valve disease     Arthritis     knees    Bunion     Callus     Chronic kidney disease     stg 3    Glaucoma     Hearing aid worn     bilateral    Heart block, first degree     Heart murmur     Hx of fall 12/2020    laceration of the head    Hyperlipidemia     Hypertension     Macular degeneration     Sinus luis angel-tachy syndrome (HCC)     SSS (sick sinus syndrome) (HCC)     Wears glasses     for reading     Social History     Socioeconomic History    Marital status: /Civil Union     Spouse name: Not on file    Number of children: Not on file    Years of education: Not on file    Highest education level: Not on file   Occupational History    Not on file   Tobacco Use    Smoking status: Never     Passive exposure: Never     Smokeless tobacco: Never   Vaping Use    Vaping status: Never Used   Substance and Sexual Activity    Alcohol use: Not Currently    Drug use: Never    Sexual activity: Not on file   Other Topics Concern    Not on file   Social History Narrative    Not on file     Social Drivers of Health     Financial Resource Strain: Low Risk  (2023)    Overall Financial Resource Strain (CARDIA)     Difficulty of Paying Living Expenses: Not hard at all   Food Insecurity: No Food Insecurity (2024)    Nursing - Inadequate Food Risk Classification     Worried About Running Out of Food in the Last Year: Never true     Ran Out of Food in the Last Year: Never true     Ran Out of Food in the Last Year: Not on file   Transportation Needs: No Transportation Needs (2024)    PRAPARE - Transportation     Lack of Transportation (Medical): No     Lack of Transportation (Non-Medical): No   Physical Activity: Not on file   Stress: Not on file   Social Connections: Not on file   Intimate Partner Violence: Not on file   Housing Stability: Low Risk  (2024)    Housing Stability Vital Sign     Unable to Pay for Housing in the Last Year: No     Number of Times Moved in the Last Year: 0     Homeless in the Last Year: No      Family History   Problem Relation Age of Onset    Heart disease Mother     Hypertension Mother     Diabetes Mother     Heart disease Father     Heart attack Father          at 55    Mental illness Neg Hx      Past Surgical History:   Procedure Laterality Date    AORTIC VALVE REPLACEMENT  2015    Porcine valve    CATARACT EXTRACTION Right     COLONOSCOPY      CT XCAPSL CTRC RMVL INSJ IO LENS PROSTH W/O ECP Right 2021    Procedure: EXTRACTION EXTRACAPSULAR CATARACT PHACO INTRAOCULAR LENS (IOL);  Surgeon: Chadwick Montague MD;  Location: [facility] MAIN OR;  Service: Ophthalmology    CT XCAPSL CTRC RMVL INSJ IO LENS PROSTH W/O ECP Left 2022    Procedure: EXTRACTION EXTRACAPSULAR CATARACT PHACO  "INTRAOCULAR LENS (IOL);  Surgeon: Chadwick Montague MD;  Location: [facility] MAIN OR;  Service: Ophthalmology       Current Outpatient Medications:     amLODIPine (NORVASC) 5 mg tablet, Take 1 tablet (5 mg total) by mouth daily, Disp: 90 tablet, Rfl: 1    ascorbic acid (VITAMIN C) 500 mg tablet, Take 500 mg by mouth daily with lunch, Disp: , Rfl:     Biotin 1 MG CAPS, Take by mouth daily at bedtime  , Disp: , Rfl:     cholecalciferol (VITAMIN D3) 1,000 units tablet, Take 1,000 Units by mouth daily with lunch  , Disp: , Rfl:     docusate sodium (COLACE) 100 mg capsule, Take 100 mg by mouth 2 (two) times a day, Disp: , Rfl:     losartan (COZAAR) 100 MG tablet, Take 1 tablet (100 mg total) by mouth daily, Disp: 90 tablet, Rfl: 1    LUMIGAN 0.01 % ophthalmic drops, Administer 1 drop to both eyes daily at bedtime , Disp: , Rfl:     metoprolol succinate (TOPROL-XL) 25 mg 24 hr tablet, Take 1 tablet (25 mg total) by mouth daily, Disp: 90 tablet, Rfl: 1    montelukast (SINGULAIR) 10 mg tablet, Take 1 tablet (10 mg total) by mouth daily at bedtime, Disp: 90 tablet, Rfl: 1    Multiple Vitamins-Minerals (PRESERVISION AREDS PO), Take by mouth, Disp: , Rfl:     timolol (TIMOPTIC-XE) 0.5 % ophthalmic gel-forming, Administer to both eyes every morning  , Disp: , Rfl:   No current facility-administered medications for this visit.    Facility-Administered Medications Ordered in Other Visits:     ondansetron (ZOFRAN) injection 4 mg, 4 mg, Intravenous, Once PRN, Srinivasa Bai MD        Review of Systems:  Review of Systems   Respiratory:  Negative for shortness of breath.    Cardiovascular:  Negative for chest pain, palpitations and leg swelling.   Musculoskeletal:  Positive for arthralgias.   All other systems reviewed and are negative.        Physical Exam:  Vitals:  Vitals:    03/24/25 1559   Height: 4' 11\" (1.499 m)     Physical Exam   Constitutional: She appears healthy. No distress.   Eyes: Pupils are equal, round, and reactive to " light. Conjunctivae are normal.   Neck: No JVD present.   Cardiovascular: An irregularly irregular rhythm present. Tachycardia present. Exam reveals no gallop and no friction rub.   Murmur heard.  Pulmonary/Chest: Effort normal and breath sounds normal. She has no wheezes. She has no rales.   Musculoskeletal:         General: No tenderness, deformity or edema.      Cervical back: Normal range of motion and neck supple.   Neurological: She is alert and oriented to person, place, and time.   Skin: Skin is warm and dry.        Cardiographics:  Last known EF:  55-60% with normal bioprosthetic aortic valve.    This note was completed in part utilizing M-Modal Fluency Direct Software.  Grammatical errors, random word insertions, spelling mistakes, and incomplete sentences can be an occasional consequence of this system secondary to software limitations, ambient noise, and hardware issues.  If you have any questions or concerns about the content, text, or information contained within the body of this dictation, please contact the provider for clarification.

## 2025-04-21 DIAGNOSIS — I44.0 HEART BLOCK, FIRST DEGREE: ICD-10-CM

## 2025-04-21 DIAGNOSIS — I10 ESSENTIAL HYPERTENSION: ICD-10-CM

## 2025-04-22 RX ORDER — METOPROLOL SUCCINATE 25 MG/1
25 TABLET, EXTENDED RELEASE ORAL DAILY
Qty: 90 TABLET | Refills: 1 | Status: SHIPPED | OUTPATIENT
Start: 2025-04-22

## 2025-05-05 NOTE — PROGRESS NOTES
Progress Note    Name:  Rohith Cruz  :  1935  Age:  80 y o  Date of Evaluation: 20     Patient dropped off her right hearing aid with complaints it wasn't working  Listening check indicated no sound being omitted from the hearing aid  Replaced  R  and hearing aid functioned appropriately  Hearing aid is under warranty  Stock replacement  wires were ordered  Patient contacted to  at her convenience; no charge - hearing aid under warranty       Libra Moon , CCC-A  Clinical Audiologist Rx Refill Note  Requested Prescriptions     Pending Prescriptions Disp Refills    Symbicort 160-4.5 MCG/ACT inhaler [Pharmacy Med Name: SYMBICORT 160/4.5MCG (120 ORAL INH)] 10.2 g 12     Sig: INHALE 2 PUFFS BY MOUTH TWICE DAILY      Last office visit with prescribing clinician: 9/12/2024   Last telemedicine visit with prescribing clinician: Visit date not found   Next office visit with prescribing clinician: Visit date not found       Brenda Andrew  05/05/25, 11:26 EDT

## 2025-05-22 ENCOUNTER — PROCEDURE VISIT (OUTPATIENT)
Age: OVER 89
End: 2025-05-22
Payer: MEDICARE

## 2025-05-22 VITALS — BODY MASS INDEX: 26 KG/M2 | RESPIRATION RATE: 16 BRPM | HEIGHT: 59 IN | WEIGHT: 129 LBS

## 2025-05-22 DIAGNOSIS — M79.671 PAIN IN BOTH FEET: ICD-10-CM

## 2025-05-22 DIAGNOSIS — M79.672 PAIN IN BOTH FEET: ICD-10-CM

## 2025-05-22 DIAGNOSIS — I70.209 PERIPHERAL ARTERIOSCLEROSIS (HCC): Primary | ICD-10-CM

## 2025-05-22 DIAGNOSIS — L84 CORNS: ICD-10-CM

## 2025-05-22 PROCEDURE — 11056 PARNG/CUTG B9 HYPRKR LES 2-4: CPT | Performed by: PODIATRIST

## 2025-05-22 NOTE — PROGRESS NOTES
Assessment/Plan:  Pain.  Metatarsalgia.  Peripheral artery disease.  Bunion formation.  Callus formation.  6 plantar lesions noted.  Mycotic toenail.  Clawtoe     Plan.  Chart reviewed.  Foot exam performed.  Patient educated on conditions.  Plantar lesions debrided mechanically with 10 blade without pain or complication.      Patient and family given aftercare instruction.  Return for follow-up.     Subjective:  Patient complains of pain in her feet.  Patient has pain with ambulation.  No history of trauma.  She has pain with shoe wear.                Allergies   Allergen Reactions    Cephalexin              Current Outpatient Prescriptions:     metoprolol tartrate (LOPRESSOR) 50 mg tablet, Take 50 mg by mouth 2 (two) times a day, Disp: , Rfl:     amLODIPine (NORVASC) 5 mg tablet, Take 5 mg by mouth daily, Disp: , Rfl:     aspirin 325 mg tablet, Take 325 mg by mouth daily, Disp: , Rfl:     atorvastatin (LIPITOR) 10 mg tablet, Take 10 mg by mouth daily, Disp: , Rfl:     losartan (COZAAR) 50 mg tablet, , Disp: , Rfl:     LUMIGAN 0.01 % ophthalmic drops, , Disp: , Rfl:                Patient ID: Rea Huizar is a 89 y.o. female.     HPI     The following portions of the patient's history were reviewed and updated as appropriate: allergies, current medications, past family history, past medical history, past social history, past surgical history and problem list.     Review of Systems       Objective:  Patient's shoes and socks removed.   Foot Exam     General  General Appearance: appears stated age and healthy   Orientation: alert and oriented to person, place, and time   Affect: appropriate   Gait: antalgic         Right Foot/Ankle      Inspection and Palpation  Ecchymosis: none  Tenderness: metatarsals   Swelling: metatarsals   Arch: pes planus  Hammertoes: fifth toe.  234 toes of the right foot are clawed.  Hallux valgus: yes  Hallux limitus: yes  Skin Exam: callus;      Neurovascular  Dorsalis pedis:  1+  Posterior tibial: 1+  Superficial peroneal nerve sensation: diminished  Deep peroneal nerve sensation: diminished  Sural nerve sensation: diminished  Achilles reflex: 2+     Muscle Strength  Ankle dorsiflexion: 4+        Left Foot/Ankle       Inspection and Palpation  Tenderness: metatarsals   Swelling: metatarsals   Arch: pes planus  Hammertoes: fifth toe.  Toes 234 are clawed a period  Hallux valgus: yes  Hallux limitus: yes     Neurovascular  Dorsalis pedis: 1+  Superficial peroneal nerve sensation: diminished  Deep peroneal nerve sensation: diminished  Sural nerve sensation: diminished  Achilles reflex: 2+     Muscle Strength  Ankle dorsiflexion: 4+        Physical Exam   Constitutional: She appears well-developed and well-nourished.   Cardiovascular: Normal rate and regular rhythm.    Pulses:       Dorsalis pedis pulses are 1+ on the right side, and 1+ on the left side.        Posterior tibial pulses are 1+ on the right side.   Q 9 findings noted bilateral.  Negative digital hair noted.  Positive abnormal cooling.   Feet:   Right Foot:   Skin Integrity: Positive for callus.   Neurological:   Reflex Scores:       Achilles reflexes are 2+ on the right side and 2+ on the left side.  Skin:   Tylenol submetatarsal 2 noted.  This is bilateral.    Severe thickened mycotic toenail noted bilateral.  Nails are yellow with debris.  Positive malodor noted      Distal clavi by 2nd 3rd and 4th toes of the left foot.  Nursing note and vitals reviewed.

## 2025-05-29 ENCOUNTER — OFFICE VISIT (OUTPATIENT)
Dept: AUDIOLOGY | Facility: CLINIC | Age: OVER 89
End: 2025-05-29
Payer: COMMERCIAL

## 2025-05-29 DIAGNOSIS — H90.3 SENSORINEURAL HEARING LOSS (SNHL), BILATERAL: Primary | ICD-10-CM

## 2025-05-29 NOTE — PROGRESS NOTES
Progress Note    Name:  Rea Huizar  :  1935  Age:  89 y.o.  MRN:  [id number]  Date of Evaluation: 25     HISTORY:    The patient dropped off her right hearing aid for repair.      ACTION/ADJUSTMENTS:    Listening check confirms no sound from device. Changed Right  OOW and sound is restored. Left VM for patient's daughter to let her know the aid is ready for  and inform her of $100 charge.     Sue Hawk., CCC-A  Clinical Audiologist

## 2025-06-08 DIAGNOSIS — I44.0 HEART BLOCK, FIRST DEGREE: ICD-10-CM

## 2025-06-08 DIAGNOSIS — I10 ESSENTIAL HYPERTENSION: ICD-10-CM

## 2025-06-09 RX ORDER — AMLODIPINE BESYLATE 5 MG/1
TABLET ORAL
Qty: 90 TABLET | Refills: 1 | Status: SHIPPED | OUTPATIENT
Start: 2025-06-09

## 2025-06-09 RX ORDER — METOPROLOL SUCCINATE 25 MG/1
25 TABLET, EXTENDED RELEASE ORAL DAILY
Qty: 90 TABLET | Refills: 1 | Status: SHIPPED | OUTPATIENT
Start: 2025-06-09

## 2025-06-24 ENCOUNTER — TELEPHONE (OUTPATIENT)
Dept: CARDIOLOGY CLINIC | Facility: CLINIC | Age: OVER 89
End: 2025-06-24

## 2025-08-18 ENCOUNTER — HOSPITAL ENCOUNTER (EMERGENCY)
Facility: HOSPITAL | Age: OVER 89
Discharge: HOME | End: 2025-08-18
Attending: EMERGENCY MEDICINE | Admitting: EMERGENCY MEDICINE
Payer: MEDICARE

## 2025-08-18 ENCOUNTER — APPOINTMENT (EMERGENCY)
Dept: RADIOLOGY | Facility: HOSPITAL | Age: OVER 89
End: 2025-08-18
Payer: MEDICARE

## 2025-08-18 VITALS
DIASTOLIC BLOOD PRESSURE: 88 MMHG | SYSTOLIC BLOOD PRESSURE: 168 MMHG | HEART RATE: 99 BPM | HEIGHT: 59 IN | BODY MASS INDEX: 26.21 KG/M2 | WEIGHT: 130 LBS | OXYGEN SATURATION: 98 % | TEMPERATURE: 98 F | RESPIRATION RATE: 18 BRPM

## 2025-08-18 DIAGNOSIS — M79.89 LEG SWELLING: Primary | ICD-10-CM

## 2025-08-18 DIAGNOSIS — L03.90 CELLULITIS: ICD-10-CM

## 2025-08-18 PROCEDURE — 93971 EXTREMITY STUDY: CPT

## 2025-08-18 PROCEDURE — 99284 EMERGENCY DEPT VISIT MOD MDM: CPT

## 2025-08-18 PROCEDURE — 93971 EXTREMITY STUDY: CPT | Performed by: SURGERY

## 2025-08-18 PROCEDURE — 99284 EMERGENCY DEPT VISIT MOD MDM: CPT | Performed by: EMERGENCY MEDICINE

## 2025-08-18 RX ORDER — SACCHAROMYCES BOULARDII 250 MG
250 CAPSULE ORAL 2 TIMES DAILY
Qty: 14 CAPSULE | Refills: 0 | Status: SHIPPED | OUTPATIENT
Start: 2025-08-18 | End: 2025-08-25

## 2025-08-18 RX ORDER — ASPIRIN 81 MG/1
81 TABLET, CHEWABLE ORAL DAILY
COMMUNITY

## 2025-08-18 RX ORDER — CLINDAMYCIN HYDROCHLORIDE 150 MG/1
300 CAPSULE ORAL ONCE
Status: COMPLETED | OUTPATIENT
Start: 2025-08-18 | End: 2025-08-18

## 2025-08-18 RX ORDER — CLINDAMYCIN HYDROCHLORIDE 300 MG/1
300 CAPSULE ORAL 3 TIMES DAILY
Qty: 21 CAPSULE | Refills: 0 | Status: SHIPPED | OUTPATIENT
Start: 2025-08-18 | End: 2025-08-25

## 2025-08-18 RX ADMIN — CLINDAMYCIN HYDROCHLORIDE 300 MG: 150 CAPSULE ORAL at 13:09

## 2025-08-20 ENCOUNTER — OFFICE VISIT (OUTPATIENT)
Dept: FAMILY MEDICINE CLINIC | Facility: CLINIC | Age: OVER 89
End: 2025-08-20
Payer: MEDICARE

## 2025-08-20 VITALS
WEIGHT: 130 LBS | OXYGEN SATURATION: 97 % | BODY MASS INDEX: 26.21 KG/M2 | HEIGHT: 59 IN | HEART RATE: 116 BPM | RESPIRATION RATE: 16 BRPM | TEMPERATURE: 96.7 F | SYSTOLIC BLOOD PRESSURE: 132 MMHG | DIASTOLIC BLOOD PRESSURE: 86 MMHG

## 2025-08-20 DIAGNOSIS — N18.30 STAGE 3 CHRONIC KIDNEY DISEASE, UNSPECIFIED WHETHER STAGE 3A OR 3B CKD (HCC): ICD-10-CM

## 2025-08-20 DIAGNOSIS — S06.360A: ICD-10-CM

## 2025-08-20 DIAGNOSIS — L03.116 CELLULITIS OF LEFT LOWER EXTREMITY: Primary | ICD-10-CM

## 2025-08-20 DIAGNOSIS — I10 ESSENTIAL HYPERTENSION: ICD-10-CM

## 2025-08-20 DIAGNOSIS — R41.3 MEMORY IMPAIRMENT: ICD-10-CM

## 2025-08-20 DIAGNOSIS — T78.40XA ALLERGY, INITIAL ENCOUNTER: ICD-10-CM

## 2025-08-20 DIAGNOSIS — E78.2 MIXED HYPERLIPIDEMIA: ICD-10-CM

## 2025-08-20 DIAGNOSIS — Z00.00 MEDICARE ANNUAL WELLNESS VISIT, SUBSEQUENT: ICD-10-CM

## 2025-08-20 PROCEDURE — G2211 COMPLEX E/M VISIT ADD ON: HCPCS | Performed by: FAMILY MEDICINE

## 2025-08-20 PROCEDURE — G0439 PPPS, SUBSEQ VISIT: HCPCS | Performed by: FAMILY MEDICINE

## 2025-08-20 PROCEDURE — 99214 OFFICE O/P EST MOD 30 MIN: CPT | Performed by: FAMILY MEDICINE

## 2025-08-20 RX ORDER — MONTELUKAST SODIUM 10 MG/1
10 TABLET ORAL
Qty: 90 TABLET | Refills: 1 | Status: SHIPPED | OUTPATIENT
Start: 2025-08-20

## 2025-08-21 ENCOUNTER — PROCEDURE VISIT (OUTPATIENT)
Age: OVER 89
End: 2025-08-21
Payer: MEDICARE

## 2025-08-21 VITALS — HEIGHT: 59 IN | RESPIRATION RATE: 16 BRPM | BODY MASS INDEX: 26.21 KG/M2 | WEIGHT: 130 LBS

## 2025-08-21 DIAGNOSIS — M79.672 PAIN IN BOTH FEET: ICD-10-CM

## 2025-08-21 DIAGNOSIS — L84 CORNS: ICD-10-CM

## 2025-08-21 DIAGNOSIS — M79.671 PAIN IN BOTH FEET: ICD-10-CM

## 2025-08-21 DIAGNOSIS — I70.209 PERIPHERAL ARTERIOSCLEROSIS (HCC): Primary | ICD-10-CM

## 2025-08-21 PROCEDURE — 11056 PARNG/CUTG B9 HYPRKR LES 2-4: CPT | Performed by: PODIATRIST

## (undated) DEVICE — GLOVE SRG BIOGEL 7.5

## (undated) DEVICE — CLEARCUT® SLIT KNIFE INTREPID MICRO-COAXIAL SYSTEM 2.4 SB: Brand: CLEARCUT®; INTREPID

## (undated) DEVICE — B-H IRRIGATING CAN 19GA FLAT ANGLED 8MM: Brand: OPHTHALMIC CANNULA

## (undated) DEVICE — INTREPID® TRANSFORMER IA HP: Brand: INTREPID®

## (undated) DEVICE — MICROSURGICAL INSTRUMENT IRR. CYSTITOME 25GA STRAIGHT-REVERSE CUTTING: Brand: ALCON

## (undated) DEVICE — THE MONARCH® "D" CARTRIDGE IS A SINGLE-USE POLYPROPYLENE CARTRIDGE FOR POSTERIOR CHAMBER IOL DELIVERY: Brand: MONARCH® III

## (undated) DEVICE — AIR INJECT CANNULA 27GA: Brand: OPHTHALMIC CANNULA

## (undated) DEVICE — ACTIVE FMS W/ INTREPID* ULTRA SLEEVES, 0.9MM 45° ABS* INTREPID* BALANCED TIP: Brand: ALCON

## (undated) DEVICE — EYE PACK CUSTOM -FINNEGAN